# Patient Record
Sex: FEMALE | Race: WHITE | NOT HISPANIC OR LATINO | Employment: PART TIME | URBAN - METROPOLITAN AREA
[De-identification: names, ages, dates, MRNs, and addresses within clinical notes are randomized per-mention and may not be internally consistent; named-entity substitution may affect disease eponyms.]

---

## 2020-07-23 ENCOUNTER — ANCILLARY PROCEDURE (OUTPATIENT)
Dept: GENERAL RADIOLOGY | Facility: CLINIC | Age: 25
End: 2020-07-23
Attending: NURSE PRACTITIONER
Payer: COMMERCIAL

## 2020-07-23 ENCOUNTER — OFFICE VISIT (OUTPATIENT)
Dept: INTERNAL MEDICINE | Facility: CLINIC | Age: 25
End: 2020-07-23
Payer: COMMERCIAL

## 2020-07-23 VITALS
HEART RATE: 76 BPM | HEIGHT: 65 IN | BODY MASS INDEX: 30.49 KG/M2 | SYSTOLIC BLOOD PRESSURE: 117 MMHG | WEIGHT: 183 LBS | DIASTOLIC BLOOD PRESSURE: 80 MMHG | OXYGEN SATURATION: 95 %

## 2020-07-23 DIAGNOSIS — M79.644 PAIN OF FINGER OF RIGHT HAND: ICD-10-CM

## 2020-07-23 DIAGNOSIS — Z12.4 SCREENING FOR CERVICAL CANCER: ICD-10-CM

## 2020-07-23 DIAGNOSIS — M79.644 PAIN OF FINGER OF RIGHT HAND: Primary | ICD-10-CM

## 2020-07-23 DIAGNOSIS — J45.990 EXERCISE-INDUCED ASTHMA: ICD-10-CM

## 2020-07-23 DIAGNOSIS — Z23 NEED FOR HPV VACCINATION: ICD-10-CM

## 2020-07-23 DIAGNOSIS — F43.10 PTSD (POST-TRAUMATIC STRESS DISORDER): ICD-10-CM

## 2020-07-23 DIAGNOSIS — Z86.018 HISTORY OF ATYPICAL SKIN MOLE: ICD-10-CM

## 2020-07-23 DIAGNOSIS — F19.11 SUBSTANCE ABUSE IN REMISSION (H): ICD-10-CM

## 2020-07-23 RX ORDER — BUSPIRONE HYDROCHLORIDE 15 MG/1
15 TABLET ORAL 2 TIMES DAILY
COMMUNITY
Start: 2019-07-23

## 2020-07-23 RX ORDER — ALBUTEROL SULFATE 90 UG/1
2 AEROSOL, METERED RESPIRATORY (INHALATION)
COMMUNITY
Start: 2018-12-04 | End: 2020-07-23

## 2020-07-23 RX ORDER — LAMOTRIGINE 25 MG/1
150 TABLET ORAL
COMMUNITY
End: 2024-08-12

## 2020-07-23 RX ORDER — ALBUTEROL SULFATE 90 UG/1
2 AEROSOL, METERED RESPIRATORY (INHALATION) EVERY 4 HOURS PRN
Qty: 1 INHALER | Refills: 3 | Status: SHIPPED | OUTPATIENT
Start: 2020-07-23 | End: 2023-10-24

## 2020-07-23 RX ORDER — PROPRANOLOL HYDROCHLORIDE 10 MG/1
5 TABLET ORAL
COMMUNITY
Start: 2020-02-24 | End: 2023-05-24

## 2020-07-23 ASSESSMENT — MIFFLIN-ST. JEOR: SCORE: 1585.96

## 2020-07-23 ASSESSMENT — PAIN SCALES - GENERAL: PAINLEVEL: NO PAIN (0)

## 2020-07-23 NOTE — NURSING NOTE
Chief Complaint   Patient presents with     Establish Care     pt here to establish care     IUD     pt here to ave iud removal, possible pap       Shweta Rosario CMA, EMT at 2:26 PM on 7/23/2020.

## 2020-07-23 NOTE — NURSING NOTE
Chief Complaint   Patient presents with     Establish Care     pt here to establish care     IUD     pt here to ave iud removed, possible pap       Shweta Rosario CMA, EMT at 2:22 PM on 7/23/2020.

## 2020-07-23 NOTE — PATIENT INSTRUCTIONS
[facility] Medication Refill Request Information:  * Please contact your pharmacy regarding ANY request for medication refills.  ** [facility] Prescription Fax = [phone]  * Please allow 3 business days for routine medication refills.  * Please allow 5 business days for controlled substance medication refills.     [facility] Test Result notification information:  *You will be notified with in 7-10 days of your appointment day regarding the results of your test.  If you are on MyChart you will be notified as soon as the provider has reviewed the results and signed off on them.    [facility]: [phone]

## 2020-07-24 ENCOUNTER — TELEPHONE (OUTPATIENT)
Dept: INTERNAL MEDICINE | Facility: CLINIC | Age: 25
End: 2020-07-24
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
## 2020-07-24 NOTE — TELEPHONE ENCOUNTER
----- Message from Amanda Lepe RN sent at 7/24/2020 11:40 AM CDT -----  Please schedule with ortho (hand) and call the pt. Thanks.  Amanda

## 2020-07-27 ASSESSMENT — ENCOUNTER SYMPTOMS
STIFFNESS: 0
MUSCLE WEAKNESS: 0
JOINT SWELLING: 0
BACK PAIN: 0
MUSCLE CRAMPS: 0
MYALGIAS: 0
ARTHRALGIAS: 1
NECK PAIN: 0

## 2020-07-28 LAB
COPATH REPORT: ABNORMAL
PAP: ABNORMAL

## 2020-07-29 ENCOUNTER — TELEPHONE (OUTPATIENT)
Dept: INTERNAL MEDICINE | Facility: CLINIC | Age: 25
End: 2020-07-29

## 2020-07-29 ENCOUNTER — MYC MEDICAL ADVICE (OUTPATIENT)
Dept: INTERNAL MEDICINE | Facility: CLINIC | Age: 25
End: 2020-07-29

## 2020-07-29 NOTE — TELEPHONE ENCOUNTER
[facility] Call Center    Phone Message    May a detailed message be left on voicemail: yes     Reason for Call: Requesting Results   Name/type of test: Pap Smear   Date of test: 07/23/20  Was test done at a location other than [facility] (Please fill in the location if not [facility])?: No      Action Taken: Message routed to:  [facility] ([facility] pcp     Travel Screening: Not Applicable

## 2020-08-03 ENCOUNTER — OFFICE VISIT (OUTPATIENT)
Dept: ORTHOPEDICS | Facility: CLINIC | Age: 25
End: 2020-08-03
Attending: NURSE PRACTITIONER
Payer: COMMERCIAL

## 2020-08-03 DIAGNOSIS — S63.616S: Primary | ICD-10-CM
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
## 2020-08-03 NOTE — NURSING NOTE
Reason For Visit:   Chief Complaint   Patient presents with     Consult For     Pain of right small finger       Primary MD: Malinda Sarmiento    ?  No    Age: 25 year old    Occupation: [employer].  Currently working? Yes.  Work status?  Part-time.  Date of injury: 2 months ago  Type of injury: Fell, old fracture.  Date of surgery: NA  Type of surgery: NA.  Smoker: Yes  Request smoking cessation information: No      There were no vitals taken for this visit.      Pain Assessment  Patient Currently in Pain: Denies(Only hurts if she bends it)               QuickDASH Assessment  No flowsheet data found.       Allergies   Allergen Reactions     Quetiapine Rash       Odalys Bunn, ATC
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
## 2020-09-03 ENCOUNTER — OFFICE VISIT (OUTPATIENT)
Dept: DERMATOLOGY | Facility: CLINIC | Age: 25
End: 2020-09-03
Payer: COMMERCIAL

## 2020-09-03 DIAGNOSIS — D22.9 MULTIPLE BENIGN NEVI: Primary | ICD-10-CM

## 2020-09-03 NOTE — PATIENT INSTRUCTIONS
Skin looks great today!  Keep up the good work :)    Return in 2-3 years, sooner if concerns.    The ABCDEs of Melanoma  Asymmetry, Border (irregularity), Color (not uniform, changes in color), Diameter (greater than 6 mm which is about the size of a pencil eraser), and Evolving (any changes in preexisting moles)    Skin cancer can develop anywhere on the skin. Ask someone for help when checking your skin, especially in hard to see places. If you notice a mole different from others, or that changes, enlarges, itches, or bleeds (even if it is small), you should see a dermatologist.

## 2020-09-03 NOTE — NURSING NOTE
Chief Complaint   Patient presents with     Derm Problem     Patient is here today for a skin exam. No concerning spots.      Dorothy SNYDER CMA
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
## 2020-09-14 ENCOUNTER — THERAPY VISIT (OUTPATIENT)
Dept: OCCUPATIONAL THERAPY | Facility: CLINIC | Age: 25
End: 2020-09-14
Attending: ORTHOPAEDIC SURGERY
Payer: COMMERCIAL

## 2020-09-14 DIAGNOSIS — M25.641 FINGER STIFFNESS, RIGHT: Primary | ICD-10-CM

## 2020-09-14 DIAGNOSIS — M79.644 PAIN OF FINGER OF RIGHT HAND: ICD-10-CM

## 2020-09-14 DIAGNOSIS — S63.616S: ICD-10-CM

## 2020-09-14 PROCEDURE — 97112 NEUROMUSCULAR REEDUCATION: CPT | Mod: GO | Performed by: OCCUPATIONAL THERAPIST

## 2020-09-14 PROCEDURE — 97165 OT EVAL LOW COMPLEX 30 MIN: CPT | Mod: GO | Performed by: OCCUPATIONAL THERAPIST

## 2020-09-14 PROCEDURE — 97110 THERAPEUTIC EXERCISES: CPT | Mod: GO | Performed by: OCCUPATIONAL THERAPIST
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
## 2020-09-21 ENCOUNTER — THERAPY VISIT (OUTPATIENT)
Dept: OCCUPATIONAL THERAPY | Facility: CLINIC | Age: 25
End: 2020-09-21
Payer: COMMERCIAL

## 2020-09-21 DIAGNOSIS — M25.641 FINGER STIFFNESS, RIGHT: Primary | ICD-10-CM

## 2020-09-21 DIAGNOSIS — M79.644 PAIN OF FINGER OF RIGHT HAND: ICD-10-CM

## 2020-09-21 PROCEDURE — 97110 THERAPEUTIC EXERCISES: CPT | Mod: GO | Performed by: OCCUPATIONAL THERAPIST

## 2020-09-21 PROCEDURE — 97530 THERAPEUTIC ACTIVITIES: CPT | Mod: GO | Performed by: OCCUPATIONAL THERAPIST

## 2020-09-21 NOTE — PROGRESS NOTES
SOAP note objective information for 2020.  Please refer to the daily flowsheet for treatment today, total treatment time and time spent performing 1:1 timed codes.        Diagnosis: R SF injury  DOI: 2020        Occupational Profile Information:  Right hand dominant  Prior functional level:  no limitations  Patient reports symptoms of pain, stiffness/loss of motion and weakness/loss of strength  Special tests:  x-ray.    Previous treatment: some self therapy only an alumnafoam splint purchased at drug store  Barriers include:none  Mobility: No difficulty  Transportation: drives  Currently working in normal job withour restrictions  Leisure activities/hobbies: longboarding, play guitar, running, riding bike, doing [id number]       Functional Outcome Measure:   2020  Upper Extremity Functional Index Score:  SCORE:   Column Totals: /80: 67   (A lower score indicates greater disability.)      Objective:  Pain Level (Scale 0-10)   2020   At Rest 0   With Use 0-7 when forces     Pain Description  Date 2020   Location small finger   Pain Quality Aching, Sharp and Throbbing   Frequency intermittent     Pain is worst  daytime   Exacerbated by  gripping and when it is pushed   Relieved by rest   Progression Some better, but not recently     Edema  Mild    Sensation   WNL throughout all nerve distributions; per patient report    ROM   Fingers 2020   AROM(PROM) Right Left R   Small MP HE/96 0/100 HE/99 +3   PIP -7/69  (0/76) 0/95 -5/74 +7   DIP -5/65  (0/70) 0/80 -2/ +12   HANNON   +22         Strength  STRENGTH:   (Measured in pounds)   2020    Trials Right Left   1  2  3  Av  68  66  66 66  74  70         Assessment:  See flowsheet    Plan:  Frequency:  1 X week, once daily  Duration:  for 6 weeks    Treatment Plan:   Therapeutic Exercise:  AROM, AAROM, PROM, Tendon Gliding, Blocking, Isotonics and Isometrics  Neuromuscular re-education:  Coordination/Dexterity  and Kinesiotaping  Manual Techniques:  Joint mobilization and Myofascial release    Discharge Plan:  Achieve all LTG.  Independent in home treatment program.  Reach maximal therapeutic benefit.    Home Exercise Program:  9/21/2020  Added tracking with DIP flexion and flexion taping and flicking for extension    Next Visit:  Progress ROM and check on Ktaping

## 2020-09-28 ENCOUNTER — THERAPY VISIT (OUTPATIENT)
Dept: OCCUPATIONAL THERAPY | Facility: CLINIC | Age: 25
End: 2020-09-28
Payer: COMMERCIAL

## 2020-09-28 DIAGNOSIS — M25.641 FINGER STIFFNESS, RIGHT: Primary | ICD-10-CM

## 2020-09-28 DIAGNOSIS — M79.644 PAIN OF FINGER OF RIGHT HAND: ICD-10-CM

## 2020-09-28 PROCEDURE — 97110 THERAPEUTIC EXERCISES: CPT | Mod: GO | Performed by: OCCUPATIONAL THERAPIST

## 2020-09-28 PROCEDURE — 97530 THERAPEUTIC ACTIVITIES: CPT | Mod: GO | Performed by: OCCUPATIONAL THERAPIST

## 2020-09-28 PROCEDURE — 97140 MANUAL THERAPY 1/> REGIONS: CPT | Mod: GO | Performed by: OCCUPATIONAL THERAPIST

## 2020-09-28 NOTE — PROGRESS NOTES
Hand Therapy Progress Note  9/28/2020  Initial Visit: 9/14/20    Total Visits: 3    Diagnosis: R SF injury  DOI: 6/14/2020    S:  Subjective changes as noted by patient: pt notes she is using her hand more in all things, and writing is better Pt notes she lost the finger flexion strap and would like another one.   Functional changes noted by patient: Improvement in Self Care Tasks (dressing, eating, bathing, hygiene/toileting), Work Tasks and Household Chores  Response to previous treatment:  excellent  Patient has noted adverse reaction to:   None      Occupational Profile Information:  Right hand dominant  Prior functional level:  no limitations  Patient reports symptoms of pain, stiffness/loss of motion and weakness/loss of strength  Special tests:  x-ray.    Previous treatment: some self therapy only an alumnafoam splint purchased at drug store  Barriers include:none  Mobility: No difficulty  Transportation: drives  Currently working in normal job withour restrictions  Leisure activities/hobbies: longboarding, play guitar, running, riding bike, doing art/[id number]       Functional Outcome Measure:   9/14/2020  Upper Extremity Functional Index Score:  SCORE:   Column Totals: /80: 67   (A lower score indicates greater disability.)  9/28/2020  Upper Extremity Functional Index Score:  SCORE:   Column Totals: /80: 79   (A lower score indicates greater disability.)        Objective:  Pain Level (Scale 0-10)   9/14/2020 9/28/20   At Rest 0 0   With Use 0-7 when forces 1-2 when writing, gripping     Pain Description  Date 9/14/2020 9/28/20   Location small finger    Pain Quality Aching, Sharp and Throbbing Only some aching, or with writing   Frequency intermittent      Pain is worst  daytime    Exacerbated by  gripping and when it is pushed    Relieved by rest    Progression Some better, but not recently Much improved     Edema  resolved    Sensation   WNL throughout all nerve distributions; per patient report    ROM    Fingers 2020   AROM(PROM) Right Left R R   Small MP HE/96 0/100 HE/99 +3 HE/100 +1   PIP -  (0/76) 0/95 - +7 0/75 +6   DIP -  (0) 0/80 -274 +12 0/76 +3   HANNON   +22 +10         Strength  STRENGTH:   (Measured in pounds)   2020   Trials Right Left R   1  2  3  Av  68  66  66 66  74  70 75  72  72  73       Assessment:  Response to therapy has been improvement to:  ROM of Fingers: All Planes  Strength:   and pinch  Pain:  frequency is less, intensity of pain is decreased, duration of pain is decreased and less tender over affected area  Self Care Skills:  Improved use of hand in all areas    Overall Assessment:  Patient's symptoms are resolving.  Patient is becoming more independent in home exercise program  STG/LTG:  STGoals have been reviewed and progress or achievement has occurred;  see goal sheet for details and updates.  LTGoals have been reviewed and progress or achievement has occurred:  see goal sheet for details and updates.  I have re-evaluated this patient and find that the nature, scope, duration and intensity of the therapy is appropriate for the medical condition of the patient.        Plan:  Discharge to home program.      Treatment Plan:   Therapeutic Exercise:  AROM, AAROM, PROM, Tendon Gliding, Blocking, Isotonics and Isometrics  Neuromuscular re-education:  Coordination/Dexterity and Kinesiotaping  Manual Techniques:  Joint mobilization and Myofascial release    Discharge Plan:  Achieve all LTG.  Independent in home treatment program.  Reach maximal therapeutic benefit.    Home Exercise Program:  2020  Added tracking with DIP flexion and flexion taping and flicking for extension  2020  Continue same independently

## 2020-12-10 ENCOUNTER — OFFICE VISIT (OUTPATIENT)
Dept: FAMILY MEDICINE | Facility: CLINIC | Age: 25
End: 2020-12-10
Payer: COMMERCIAL

## 2020-12-10 VITALS
WEIGHT: 185 LBS | TEMPERATURE: 97.8 F | BODY MASS INDEX: 30.79 KG/M2 | SYSTOLIC BLOOD PRESSURE: 100 MMHG | DIASTOLIC BLOOD PRESSURE: 70 MMHG | HEART RATE: 70 BPM | OXYGEN SATURATION: 97 % | RESPIRATION RATE: 16 BRPM

## 2020-12-10 DIAGNOSIS — R11.2 INTRACTABLE VOMITING WITH NAUSEA, UNSPECIFIED VOMITING TYPE: Primary | ICD-10-CM

## 2020-12-10 DIAGNOSIS — E73.9 LACTOSE INTOLERANCE: ICD-10-CM

## 2020-12-10 PROCEDURE — 99213 OFFICE O/P EST LOW 20 MIN: CPT | Performed by: PHYSICIAN ASSISTANT

## 2020-12-10 RX ORDER — ONDANSETRON 4 MG/1
4 TABLET, ORALLY DISINTEGRATING ORAL EVERY 8 HOURS PRN
Qty: 10 TABLET | Refills: 0 | Status: SHIPPED | OUTPATIENT
Start: 2020-12-10 | End: 2021-09-16

## 2020-12-10 ASSESSMENT — ENCOUNTER SYMPTOMS
PSYCHIATRIC NEGATIVE: 1
CARDIOVASCULAR NEGATIVE: 1
MUSCULOSKELETAL NEGATIVE: 1
ENDOCRINE NEGATIVE: 1
EYES NEGATIVE: 1
RESPIRATORY NEGATIVE: 1
CONSTITUTIONAL NEGATIVE: 1
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
## 2020-12-10 NOTE — PATIENT INSTRUCTIONS
"  Patient Education     Viral Gastroenteritis (Adult)    Gastroenteritis is commonly called the \"stomach flu,\" although it has nothing to do with influenza. It is most often caused by a virus that affects the stomach and intestinal tract and usually lasts from 2 to 7 days. Common viruses causing gastroenteritis include norovirus, rotavirus, and hepatitis A. Non-viral causes of gastroenteritis include bacteria, parasites, and toxins.  The danger from repeated vomiting or diarrhea is dehydration. This is the loss of too much fluid from the body. When this occurs, body fluids must be replaced. Antibiotics don't help with this illness because it is usually viral. Simple home treatment will be helpful.  Symptoms of viral gastroenteritis may include:    Watery, loose stools    Stomach pain or abdominal cramps    Fever and chills    Nausea and vomiting    Loss of bowel control    Headache  Home care  Gastroenteritis is transmitted by contact with the stool or vomit of an infected person. This can occur from person to person or from contact with a contaminated surface.  Follow these guidelines when caring for yourself at home:    If symptoms are severe, rest at home for the next 24 hours or until you are feeling better.    Wash your hands with soap and water or use alcohol-based  to prevent the spread of infection. Wash your hands after touching anyone who is sick.    Wash your hands or use alcohol-based  after using the toilet and before meals. Clean the toilet after each use.  Remember these tips when preparing food:    People with diarrhea should not prepare or serve food to others. When preparing foods, wash your hands before and after.    Wash your hands after using cutting boards, countertops, knives, or utensils that have been in contact with raw food.    Dry your hands with a single use towel.    Keep uncooked meats away from cooked and ready-to-eat foods.  Medicine  You may use acetaminophen or " NSAID medicines like ibuprofen or naproxen to control fever unless another medicine was given. If you have chronic liver or kidney disease, talk with your healthcare provider before using these medicines. Also talk with your provider if you've had a stomach ulcer or gastrointestinal bleeding. Don't give aspirin to anyone under 18 years of age who is ill with a fever. It may cause severe liver damage. Don't use NSAIDS is you are already taking one for another condition (like arthritis) or are on aspirin (such as for heart disease or after a stroke).  If medicine for vomiting or diarrhea are prescribed, take these only as directed. Nausea and diarrhea medicines are generally OK unless you have bleeding, fever, or severe abdominal pain.  Diet  Follow these guidelines for food:    Water and liquids are important so you don't get dehydrated. Drink a small amount at a time or suck on ice chips if you are vomiting.    If you eat, avoid fatty, greasy, spicy, or fried foods.    Don't eat dairy if you have diarrhea. This can make diarrhea worse.    Avoid tobacco, alcohol, and caffeine which may worsen symptoms.  During the first 24 hours (the first full day), follow the diet below:    Beverages. Sports drinks, soft drinks without caffeine, ginger ale, mineral water (plain or flavored), decaffeinated tea and coffee. If you are very dehydrated, sports drinks aren't a good choice. They have too much sugar and not enough electrolytes. In this case, commercially available products called oral rehydration solutions, are best.    Soups. Eat clear broth, consommé, and bouillon.    Desserts. Eat gelatin, ice pops, and fruit juice bars.  During the next 24 hours (the second day), you may add the following to the above:    Hot cereal, plain toast, bread, rolls, and crackers    Plain noodles, rice, mashed potatoes, chicken noodle or rice soup    Unsweetened canned fruit (avoid pineapple), bananas    Limit fat intake to less than 15 grams  per day. Do this by avoiding margarine, butter, oils, mayonnaise, sauces, gravies, fried foods, peanut butter, meat, poultry, and fish.    Limit fiber and avoid raw or cooked vegetables, fresh fruits (except bananas), and bran cereals.    Limit caffeine and chocolate. Don't use spices or seasonings other than salt.    Limit dairy products.    Avoid alcohol.  During the next 24 hours:    Gradually resume a normal diet as you feel better and your symptoms improve.    If at any time it starts getting worse again, go back to clear liquids until you feel better.  Follow-up care  Follow up with your healthcare provider, or as advised. Call your provider if you don't get better within 24 hours or if diarrhea lasts more than a week. Also follow up if you are unable to keep down liquids and get dehydrated. If a stool (diarrhea) sample was taken, call as directed for the results.  Call 911  Call 911 if any of these occur:    Trouble breathing    Chest pain    Confused    Severe drowsiness or trouble awakening    Fainting or loss of consciousness    Rapid heart rate    Seizure    Stiff neck  When to seek medical advice  Call your healthcare provider right away if any of these occur:    Abdominal pain that gets worse    Continued vomiting (unable to keep liquids down)    Frequent diarrhea (more than 5 times a day)    Blood in vomit or stool (black or red color)    Dark urine, reduced urine output, or extreme thirst    Weakness or dizziness    Drowsiness    Fever of 100.4 F (38 C) or higher, or as directed by your healthcare provider    New rash  StayWell last reviewed this educational content on 6/1/2018 2000-2020 [employer], [employer]. [address] [place] [address], [place] [address]. All rights reserved. This information is not intended as a substitute for professional medical care. Always follow your healthcare professional's instructions.

## 2020-12-11 ASSESSMENT — ASTHMA QUESTIONNAIRES: ACT_TOTALSCORE: 22

## 2021-01-15 ENCOUNTER — HEALTH MAINTENANCE LETTER (OUTPATIENT)
Age: 26
End: 2021-01-15

## 2021-01-25 ENCOUNTER — ALLIED HEALTH/NURSE VISIT (OUTPATIENT)
Dept: INTERNAL MEDICINE | Facility: CLINIC | Age: 26
End: 2021-01-25
Payer: COMMERCIAL

## 2021-01-25 DIAGNOSIS — Z23 NEED FOR HPV VACCINE: Primary | ICD-10-CM

## 2021-01-25 PROCEDURE — 99207 PR NO CHARGE NURSE ONLY: CPT

## 2021-01-25 PROCEDURE — 90471 IMMUNIZATION ADMIN: CPT

## 2021-01-25 PROCEDURE — 90651 9VHPV VACCINE 2/3 DOSE IM: CPT

## 2021-01-25 NOTE — NURSING NOTE
Ame Ayala comes into clinic today at the request of Dr. Malinda Sarmiento Ordering Provider for 2nd HPV vaccination/    Patient declined any previous vaccine reactions. Patient is in good health and received vaccine in the left deltoid. Patient advised to wait for fifteen minutes in [facility] lobby to ensure no adverse side affects. Patient was scheduled for her 3rd HPV vaccine one month out due to her initial HPV vaccine.    This service provided today was under the supervising provider of the day Dr. Green, who was available if needed.    Mari Sierra MA

## 2021-02-19 ENCOUNTER — IMMUNIZATION (OUTPATIENT)
Dept: PEDIATRICS | Facility: CLINIC | Age: 26
End: 2021-02-19
Payer: COMMERCIAL

## 2021-02-19 PROCEDURE — 91300 PR COVID VAC PFIZER DIL RECON 30 MCG/0.3 ML IM: CPT

## 2021-02-19 PROCEDURE — 0001A PR COVID VAC PFIZER DIL RECON 30 MCG/0.3 ML IM: CPT

## 2021-02-25 ENCOUNTER — TELEPHONE (OUTPATIENT)
Dept: INTERNAL MEDICINE | Facility: CLINIC | Age: 26
End: 2021-02-25

## 2021-02-25 NOTE — TELEPHONE ENCOUNTER
[facility] Call Center    Phone Message    May a detailed message be left on voicemail: yes     Reason for Call: Other: Patient has an appointment for tomorrow to get her HPV shot but was wondering if she could wait until after her COVID19 vaccine? Please call patient ASAP. Thank you.      Action Taken: Message routed to:  [facility]    Travel Screening: Not Applicable

## 2021-02-26 NOTE — TELEPHONE ENCOUNTER
Called pt and LVM. Recommended that she get the COVID-19 vaccine series first and then continue with the third dose of the HPV vaccine.     Veronica Fields, EMT at 8:01 AM sign on 2/26/2021

## 2021-03-12 ENCOUNTER — IMMUNIZATION (OUTPATIENT)
Dept: PEDIATRICS | Facility: CLINIC | Age: 26
End: 2021-03-12
Attending: INTERNAL MEDICINE
Payer: COMMERCIAL

## 2021-03-12 PROCEDURE — 91300 PR COVID VAC PFIZER DIL RECON 30 MCG/0.3 ML IM: CPT

## 2021-03-12 PROCEDURE — 0002A PR COVID VAC PFIZER DIL RECON 30 MCG/0.3 ML IM: CPT

## 2021-03-26 ENCOUNTER — ALLIED HEALTH/NURSE VISIT (OUTPATIENT)
Dept: INTERNAL MEDICINE | Facility: CLINIC | Age: 26
End: 2021-03-26
Payer: COMMERCIAL

## 2021-03-26 DIAGNOSIS — Z23 NEED FOR VACCINATION: Primary | ICD-10-CM

## 2021-03-26 PROCEDURE — 99207 PR NO CHARGE INJECTABLE MED/DRUG: CPT

## 2021-03-26 PROCEDURE — 90651 9VHPV VACCINE 2/3 DOSE IM: CPT

## 2021-03-26 PROCEDURE — 90471 IMMUNIZATION ADMIN: CPT

## 2021-03-26 NOTE — NURSING NOTE
Chief Complaint   Patient presents with     Imm/Inj     3rd HPV     Ame Ayala comes into clinic today at the request of Malinda Sarmiento. Ordering Provider for 3rd HPV vaccinaiton.    Pt received injection and tolerated well.    This service provided today was under the supervising provider of the day Dr. Green, who was available if needed.    Kimberly H. Nissen

## 2021-05-06 ENCOUNTER — ANCILLARY PROCEDURE (OUTPATIENT)
Dept: GENERAL RADIOLOGY | Facility: CLINIC | Age: 26
End: 2021-05-06
Attending: PEDIATRICS
Payer: OTHER MISCELLANEOUS

## 2021-05-06 ENCOUNTER — OFFICE VISIT (OUTPATIENT)
Dept: ORTHOPEDICS | Facility: CLINIC | Age: 26
End: 2021-05-06
Payer: OTHER MISCELLANEOUS

## 2021-05-06 VITALS
BODY MASS INDEX: 30.82 KG/M2 | DIASTOLIC BLOOD PRESSURE: 74 MMHG | HEIGHT: 65 IN | SYSTOLIC BLOOD PRESSURE: 118 MMHG | WEIGHT: 185 LBS

## 2021-05-06 DIAGNOSIS — M54.16 LUMBAR RADICULOPATHY: ICD-10-CM

## 2021-05-06 DIAGNOSIS — M54.41 ACUTE BILATERAL LOW BACK PAIN WITH RIGHT-SIDED SCIATICA: Primary | ICD-10-CM

## 2021-05-06 DIAGNOSIS — M54.50 LOWER BACK PAIN: ICD-10-CM

## 2021-05-06 PROCEDURE — 72100 X-RAY EXAM L-S SPINE 2/3 VWS: CPT | Performed by: RADIOLOGY

## 2021-05-06 PROCEDURE — 99204 OFFICE O/P NEW MOD 45 MIN: CPT | Performed by: PEDIATRICS

## 2021-05-06 RX ORDER — METHYLPREDNISOLONE 4 MG
TABLET, DOSE PACK ORAL
Qty: 21 TABLET | Refills: 0 | Status: SHIPPED | OUTPATIENT
Start: 2021-05-06 | End: 2021-09-16

## 2021-05-06 ASSESSMENT — MIFFLIN-ST. JEOR: SCORE: 1590.03

## 2021-05-06 NOTE — PATIENT INSTRUCTIONS
Plan:  - Today's Plan of Care:  Rehab: Physical Therapy: [facility] - [phone]  Prescription Medication as directed: Medrol Dose Pack  Work Letter    -We also discussed other future treatment options:  Lumbar MRI    Follow Up: 3 weeks    If you have any further questions for your physician or physician s care team you can call [phone] and use option 3 to leave a voice message. Calls received during business hours will be returned same day.

## 2021-05-06 NOTE — LETTER
5/6/2021         RE: Ame Ayala  [address]  [facility] [address]        Dear Colleague,    Thank you for referring your patient, Ame Ayala, to the [facility]. Please see a copy of my visit note below.    ASSESSMENT & PLAN    Ame was seen today for recheck.    Diagnoses and all orders for this visit:    Acute bilateral low back pain with right-sided sciatica  -     XR Lumbar Spine 2/3 Views; Future  -     methylPREDNISolone (MEDROL DOSEPAK) 4 MG tablet therapy pack; Follow Package Directions  -     [facility]; Future    Lumbar radiculopathy  -     methylPREDNISolone (MEDROL DOSEPAK) 4 MG tablet therapy pack; Follow Package Directions  -     [facility]; Future      This issue is acute and Unchanged.  Low back pain, likely lumbar radiculopathy. SI joint dysfunction remains a possibility as well. Normal neurologic exam currently. I reviewed images and discussed the diagnosis. Recommended Medrol Dosepak (I reviewed the mechanism of action as well as risk/benefit profile of medications. Questions answered.) along with physical therapy.  Follow up in 3-4 weeks, would consider advanced imaging pending clinical course.    Plan:  - Today's Plan of Care:  Rehab: Physical Therapy: [facility] - [phone]  Prescription Medication as directed: Medrol Dose Pack  Work Letter    -We also discussed other future treatment options:  Lumbar MRI    Follow Up: 3 weeks    Concerning signs and symptoms were reviewed.  The patient expressed understanding of this management plan and all questions were answered at this time.    Sandy Jones MD [facility]  Sports Medicine Physician  [facility]      -----  Chief Complaint   Patient presents with     Lower Back - RECHECK       SUBJECTIVE  Ame Ayala is a/an 25 year old female who is seen as a self referral for evaluation of lower back pain.     The patient is seen by  "themselves.  The patient is Right handed    Onset: 1 day(s) ago. Patient describes injury at work. She states she was giving a kid a piggy back ride and twisted. She feels pain in the right lower back. She reports radiating right leg pain with numbness and tingling. He has history of sciatica for several years on and off, this is worse. She is taking ibuprofen for her pain.    Location of Pain: right lower back  Worsened by: lifting her leg, bending over, lifting, crossing her legs  Better with: ice and ibuprofen  Treatments tried: rest/activity avoidance, ice and ibuprofen  Associated symptoms: numbness and tingling    Orthopedic/Surgical history: NO  Social History/Occupation: pediatric trauma therapist    No family history pertinent to patient's problem today.    REVIEW OF SYSTEMS:  Review of Systems  Skin: no bruising, no swelling  Musculoskeletal: as above  Neurologic: yes numbness, paresthesias  Remainder of review of systems is negative including constitutional, CV, pulmonary, GI, except as noted in HPI or medical history.    OBJECTIVE:  /74   Ht 1.651 m (5' 5\")   Wt 83.9 kg (185 lb)   BMI 30.79 kg/m     General: healthy, alert and in no distress  HEENT: no scleral icterus or conjunctival erythema  Skin: no suspicious lesions or rash. No jaundice.  CV: distal perfusion intact  Resp: normal respiratory effort without conversational dyspnea   Psych: normal mood and affect  Gait: normal steady gait with appropriate coordination and balance  Neuro: Normal light sensory exam of lower extremity    Low back exam:  Inspection:     no visible deformity in the low back       normal skin       normal vascular       normal lymphatic       no asymmetry    Posture:      normal    Tender:     paraspinal muscles       Right SI joint    Non Tender:     remainder of lumbar spine    ROM:      limited flexion due to pain       limited extension due to pain    Strength:     hip flexion 5/5 bilateral       knee extension " 5/5 bilateral       ankle dorsiflexion 5/5 bilateral       ankle plantarflexion 5/5 bilateral       dorsiflexion of the great toe 5/5 bilateral       able to heel and toe walk    Reflexes:     patellar (L3, L4) symmetric normal       achilles tendons (S1) symmetric normal    Sensation:    grossly intact throughout lower extremities    Special tests:      straight leg raise left negative        straight leg raise right positive       neg (-) OSKAR  bilateral       neg (-) FADIR  bilateral    RADIOLOGY:  I independently ordered, visualized and reviewed these images with the patient  3 XR views of lumbar spine reviewed: no acute bony abnormality, no significant degenerative change  - will follow official read      Review of the result(s) of each unique test - XR             Again, thank you for allowing me to participate in the care of your patient.        Sincerely,        Sandy Jones MD

## 2021-05-06 NOTE — PROGRESS NOTES
ASSESSMENT & PLAN    Ame was seen today for recheck.    Diagnoses and all orders for this visit:    Acute bilateral low back pain with right-sided sciatica  -     XR Lumbar Spine 2/3 Views; Future  -     methylPREDNISolone (MEDROL DOSEPAK) 4 MG tablet therapy pack; Follow Package Directions  -     [facility]; Future    Lumbar radiculopathy  -     methylPREDNISolone (MEDROL DOSEPAK) 4 MG tablet therapy pack; Follow Package Directions  -     [facility]; Future      This issue is acute and Unchanged.  Low back pain, likely lumbar radiculopathy. SI joint dysfunction remains a possibility as well. Normal neurologic exam currently. I reviewed images and discussed the diagnosis. Recommended Medrol Dosepak (I reviewed the mechanism of action as well as risk/benefit profile of medications. Questions answered.) along with physical therapy.  Follow up in 3-4 weeks, would consider advanced imaging pending clinical course.    Plan:  - Today's Plan of Care:  Rehab: Physical Therapy: [facility] - [phone]  Prescription Medication as directed: Medrol Dose Pack  Work Letter    -We also discussed other future treatment options:  Lumbar MRI    Follow Up: 3 weeks    Concerning signs and symptoms were reviewed.  The patient expressed understanding of this management plan and all questions were answered at this time.    Sandy Jones MD [facility]  Sports Medicine Physician  [facility]      -----  Chief Complaint   Patient presents with     Lower Back - RECHECK       SUBJECTIVE  Ame Ayala is a/an 25 year old female who is seen as a self referral for evaluation of lower back pain.     The patient is seen by themselves.  The patient is Right handed    Onset: 1 day(s) ago. Patient describes injury at work. She states she was giving a kid a piggy back ride and twisted. She feels pain in the right lower back. She reports radiating right leg pain with numbness and  "tingling. He has history of sciatica for several years on and off, this is worse. She is taking ibuprofen for her pain.    Location of Pain: right lower back  Worsened by: lifting her leg, bending over, lifting, crossing her legs  Better with: ice and ibuprofen  Treatments tried: rest/activity avoidance, ice and ibuprofen  Associated symptoms: numbness and tingling    Orthopedic/Surgical history: NO  Social History/Occupation: pediatric trauma therapist    No family history pertinent to patient's problem today.    REVIEW OF SYSTEMS:  Review of Systems  Skin: no bruising, no swelling  Musculoskeletal: as above  Neurologic: yes numbness, paresthesias  Remainder of review of systems is negative including constitutional, CV, pulmonary, GI, except as noted in HPI or medical history.    OBJECTIVE:  /74   Ht 1.651 m (5' 5\")   Wt 83.9 kg (185 lb)   BMI 30.79 kg/m     General: healthy, alert and in no distress  HEENT: no scleral icterus or conjunctival erythema  Skin: no suspicious lesions or rash. No jaundice.  CV: distal perfusion intact  Resp: normal respiratory effort without conversational dyspnea   Psych: normal mood and affect  Gait: normal steady gait with appropriate coordination and balance  Neuro: Normal light sensory exam of lower extremity    Low back exam:  Inspection:     no visible deformity in the low back       normal skin       normal vascular       normal lymphatic       no asymmetry    Posture:      normal    Tender:     paraspinal muscles       Right SI joint    Non Tender:     remainder of lumbar spine    ROM:      limited flexion due to pain       limited extension due to pain    Strength:     hip flexion 5/5 bilateral       knee extension 5/5 bilateral       ankle dorsiflexion 5/5 bilateral       ankle plantarflexion 5/5 bilateral       dorsiflexion of the great toe 5/5 bilateral       able to heel and toe walk    Reflexes:     patellar (L3, L4) symmetric normal       achilles tendons (S1) " symmetric normal    Sensation:    grossly intact throughout lower extremities    Special tests:      straight leg raise left negative        straight leg raise right positive       neg (-) OSKAR  bilateral       neg (-) FADIR  bilateral    RADIOLOGY:  I independently ordered, visualized and reviewed these images with the patient  3 XR views of lumbar spine reviewed: no acute bony abnormality, no significant degenerative change  - will follow official read      Review of the result(s) of each unique test - XR

## 2021-05-06 NOTE — LETTER
May 6, 2021      Ame Ayala  [address]  [place] [address]        To Whom It May Concern,     Ame is under my care for low back injury.  She may return to work next available shift with the following restrictions.  - Seated work only  - Allow to change positions    Off work if unable to work under these restrictions.    Start physical therapy and follow up in ~ 3 weeks.    Sincerely,        Sandy Jones MD

## 2021-05-07 NOTE — RESULT ENCOUNTER NOTE
These results were discussed during office visit.    Sandy Jones MD, CAQ  Primary Care Sports Medicine  [facility]

## 2021-05-12 ENCOUNTER — THERAPY VISIT (OUTPATIENT)
Dept: PHYSICAL THERAPY | Facility: CLINIC | Age: 26
End: 2021-05-12
Payer: OTHER MISCELLANEOUS

## 2021-05-12 DIAGNOSIS — M54.41 ACUTE BILATERAL LOW BACK PAIN WITH RIGHT-SIDED SCIATICA: ICD-10-CM

## 2021-05-12 PROCEDURE — 97112 NEUROMUSCULAR REEDUCATION: CPT | Mod: GP | Performed by: PHYSICAL THERAPIST

## 2021-05-12 PROCEDURE — 97110 THERAPEUTIC EXERCISES: CPT | Mod: GP | Performed by: PHYSICAL THERAPIST

## 2021-05-12 PROCEDURE — 97161 PT EVAL LOW COMPLEX 20 MIN: CPT | Mod: GP | Performed by: PHYSICAL THERAPIST

## 2021-05-12 NOTE — PROGRESS NOTES
[facility] Initial Evaluation -- Lumbar    Date: May 12, 2021  Ame Ayala is a 25 year old female with a lumbar and R>L LE condition.   Referral: sports/ortho  Work mechanical stresses:  Computer work, driving, lifting carrying, sitting, standing  Employment status:  Mental health practitioner  Leisure mechanical stresses: yoga, exercise  Functional disability score (BRIANA/STarT Back):  36%; 6/9--MEDIUM  VAS score (0-10): 4/10  Patient goals/expectations:  To not have the pain    HISTORY:    Present symptoms: R>L LBP, pain into L LE to knee, R LE to toes  Pain quality (sharp/shooting/stabbing/aching/burning/cramping):  Aching, sharp   Paresthesia (yes/no):  Tingling R toes, middle toe    Present since (onset date): 05/05/2021.     Symptoms (improving/unchanging/worsening): worsening.     Symptoms commenced as a result of: carrying a child on her back at work while holding another child's hand who pulled her a little, went to yoga that night and had severe pain    Condition occurred in the following environment:   work     Symptoms at onset (back/thigh/leg): R LBP  Constant symptoms (back/thigh/leg): B LBP  Intermittent symptoms (back/thigh/leg): R LE to foot, L LE to knee    Symptoms are made worse with the following: sitting and moving leg in/out, getting in/out of the car, lifting, lifting, sitting 1 hour, driving, always bending, walking 1 mile, time of day no difference, no effect still or moving   Symptoms are made better with the following: ibuprofen    Disturbed sleep (yes/no):  Yes--occasional Sleeping postures (prone/sup/side R/L): sides    Previous episodes (0/1-5/6-10/11+): ongoing, intermittent since high school Year of first episode: high school--8 years ago?    Previous history: self-limiting episodes of LBP over the past several years  Previous treatments: none      Specific Questions:  Cough/Sneeze/Strain (pos/neg): neg   Bowel/Bladder (normal/abnormal): normal  Gait  (normal/abnormal): normal  Medications (nil/NSAIDS/analg/steroids/anticoag/other):  Steroids and Other - Cardiac and Anti-seizure  Medical allergies:  none  General health (excellent/good/fair/poor):  good  Pertinent medical history:  Asthma, Chemical dependency, Depression, Heart problems, Mental illness, Seizures and Smoking  Imaging (None/Xray/MRI/Other):  normal  Recent or major surgery (yes/no):  no  Night pain (yes/no): no  Accidents (yes/no): no  Unexplained weight loss (yes/no): no  Barriers at home: no  Other red flags: no    EXAMINATION    Posture:   Sitting (good/fair/poor): fair  Standing (good/fair/poor):good  Lordosis (red/acc/normal): normal  Correction of posture (better/worse/no effect): NE or ?better    Lateral Shift (right/left/nil): nil  Relevant (yes/no):  na  Other Observations: na    Neurological:    Motor deficit:  normal  Reflexes:  Not assessed  Sensory deficit:  normal  Dural signs:  Positive slump B for LBP    Movement Loss:   Scotty Mod Min Nil Pain   Flexion    x Increases B LBP   Extension  x x  Increases B LBP   Side Gliding R    x NE   Side Gliding L   x x Increases R LBP     Test Movements:   During: produces, abolishes, increases, decreases, no effect, centralizing, peripheralizing   After: better, worse, no better, no worse, no effect, centralized, peripheralized    Pretest symptoms standing:    Symptoms During Symptoms After ROM increased ROM decreased No Effect   FIS        Rep FIS        EIS        Rep EIS          Pretest symptoms lying:  Prone lying--LBP B 2/10   Symptoms During Symptoms After ROM increased ROM decreased No Effect   EPIFANIO        Rep EPIFANIO        EIL increases No Worse         Rep EIL Decreases    Better    X--ext       If required, pretest symptoms:    Symptoms During Symptoms After ROM increased ROM decreased No Effect   SGIS - R        Rep SGIS - R        SGIS - L        Rep SGIS - L          Static Tests:  Sitting slouched:     Sitting erect:    Standing  slouched:   Standing erect:    Lying prone in extension:  Increases, NW Long sitting:      Other Tests: L1-5 ext mobs--increases LBP, NW after    Provisional Classification:  Derangement - Asymmetrical, unilateral, symptoms below knee    Principle of Management:  Education:  Posture--use of lumbar roll in sitting, importance/impact of posture; POC, treatment rationale, expectations, centralization/peripheralization   Equipment provided:  none  Mechanical therapy (Y/N):  y   Extension principle:  REIL x10 reps, every 2 hours  Lateral Principle:    Flexion principle:    Other:      ASSESSMENT/PLAN:    Patient is a 25 year old female with lumbar complaints.  Provisional classification of lumbar derangement with directional preference for extension.  She had decreased pain and improved ROM after repeated lumbar extension in lying and will try this in addition to posture correction at home to assess effect.  She should make good progress with a lumbar extension program to improve mechanics, decrease pain, and improve overall mobility and function.   Patient has the following significant findings with corresponding treatment plan.                Diagnosis 1:  LBP, R>L radiculopathy  Pain -  self management, education, directional preference exercise and home program  Decreased ROM/flexibility - manual therapy, therapeutic exercise and home program  Decreased function - therapeutic activities and home program  Impaired posture - neuro re-education and home program    Low complexity.    Previous and current functional limitations:  (See Goal Flow Sheet for this information)    Short term and Long term goals: (See Goal Flow Sheet for this information)     Communication ability:  Patient appears to be able to clearly communicate and understand verbal and written communication and follow directions correctly.  Treatment Explanation - The following has been discussed with the patient:   RX ordered/plan of care  Anticipated  outcomes  Possible risks and side effects  This patient would benefit from PT intervention to resume normal activities.   Rehab potential is good.    Frequency:  1 X week, once daily  Duration:  for 6 weeks  Discharge Plan:  Achieve all LTG.  Independent in home treatment program.  Reach maximal therapeutic benefit.    Please refer to the daily flowsheet for treatment today, total treatment time and time spent performing 1:1 timed codes.

## 2021-05-17 ENCOUNTER — THERAPY VISIT (OUTPATIENT)
Dept: PHYSICAL THERAPY | Facility: CLINIC | Age: 26
End: 2021-05-17
Payer: OTHER MISCELLANEOUS

## 2021-05-17 DIAGNOSIS — M54.16 LUMBAR RADICULOPATHY: ICD-10-CM

## 2021-05-17 DIAGNOSIS — M54.41 ACUTE BILATERAL LOW BACK PAIN WITH RIGHT-SIDED SCIATICA: ICD-10-CM

## 2021-05-17 PROCEDURE — 97110 THERAPEUTIC EXERCISES: CPT | Performed by: PHYSICAL THERAPIST

## 2021-05-27 ENCOUNTER — OFFICE VISIT (OUTPATIENT)
Dept: ORTHOPEDICS | Facility: CLINIC | Age: 26
End: 2021-05-27
Payer: OTHER MISCELLANEOUS

## 2021-05-27 ENCOUNTER — THERAPY VISIT (OUTPATIENT)
Dept: PHYSICAL THERAPY | Facility: CLINIC | Age: 26
End: 2021-05-27
Payer: OTHER MISCELLANEOUS

## 2021-05-27 VITALS
SYSTOLIC BLOOD PRESSURE: 111 MMHG | BODY MASS INDEX: 30.82 KG/M2 | DIASTOLIC BLOOD PRESSURE: 77 MMHG | HEIGHT: 65 IN | WEIGHT: 185 LBS

## 2021-05-27 DIAGNOSIS — M54.41 ACUTE BILATERAL LOW BACK PAIN WITH RIGHT-SIDED SCIATICA: ICD-10-CM

## 2021-05-27 DIAGNOSIS — M54.41 ACUTE BILATERAL LOW BACK PAIN WITH RIGHT-SIDED SCIATICA: Primary | ICD-10-CM

## 2021-05-27 PROCEDURE — 97110 THERAPEUTIC EXERCISES: CPT | Mod: GP | Performed by: PHYSICAL THERAPIST

## 2021-05-27 PROCEDURE — 99213 OFFICE O/P EST LOW 20 MIN: CPT | Performed by: PEDIATRICS

## 2021-05-27 PROCEDURE — 97530 THERAPEUTIC ACTIVITIES: CPT | Mod: GP | Performed by: PHYSICAL THERAPIST

## 2021-05-27 ASSESSMENT — MIFFLIN-ST. JEOR: SCORE: 1590.03

## 2021-05-27 NOTE — PATIENT INSTRUCTIONS
Plan:  - Today's Plan of Care:  Continue Physical Therapy  Work Note Updated    -We also discussed other future treatment options:  Lumbar MRI    Follow Up: 4-6 weeks if needed    If you have any further questions for your physician or physician s care team you can call [phone] and use option 3 to leave a voice message. Calls received during business hours will be returned same day.

## 2021-05-27 NOTE — LETTER
May 27, 2021      Ame Ayala  [address] [place] [address]      To Whom It May Concern,      Ame is under my care for low back injury.  She may return to work on Tuesday June 1st with the following restrictions in place for 2 weeks:  - Seated work half the day (4 hours)  - No restrictions for 4 hour shifts     Then return without restrictions     Continue physical therapy, follow up in 4-6 weeks if needed.     Sincerely,           Sandy Jones MD

## 2021-05-27 NOTE — LETTER
5/27/2021         RE: Ame Ayala  [address]  [place] [address]        Dear Colleague,    Thank you for referring your patient, Ame Ayala, to the [facility]. Please see a copy of my visit note below.    ASSESSMENT & PLAN    Ame was seen today for pain, lumbar/si and follow up.    Diagnoses and all orders for this visit:    Acute bilateral low back pain with right-sided sciatica      This issue is acute and Improving.    Given improvement with update work restrictions, continue physical therapy, follow up as needed.    Plan:  - Today's Plan of Care:  Continue Physical Therapy  Work Note Updated    -We also discussed other future treatment options:  Lumbar MRI    Follow Up: 4-6 weeks if needed    Concerning signs and symptoms were reviewed.  The patient expressed understanding of this management plan and all questions were answered at this time.    Sandy Jones MD [facility]  Sports Medicine Physician  [facility]      SUBJECTIVE- Interim History May 27, 2021    Chief Complaint   Patient presents with     Lower Back - Pain, Lumbar/SI, Follow Up       Ame Ayala is a 25 year old female who is seen in f/u up for Acute bilateral low back pain with right-sided sciatica. Since last visit on 5/6/21, patient has been doing a lot better.  physical therapy is helping a lot. She is only having pain 1-2x/week and it's localized to the low back. Is no longer having shooting pain into leg.  - Now ~ 3.5 weeks from initial injury    Worsened by: flexing, sitting hunched over, crossing the legs over  Better with: physical therapy, resting, posture adjustments  Treatments tried: rest/activity avoidance, ibuprofen, home exercises, physical therapy (3 visits) and previous imaging (xray 5/6/21)  Associated symptoms:  locking or catching    The patient is seen by themselves.  The patient is Right handed    Orthopedic/Surgical history: NO  Social History/Occupation:  "seated work    No family history pertinent to patient's problem today.    REVIEW OF SYSTEMS:  Review of Systems  Skin: no bruising, no swelling  Musculoskeletal: as above  Neurologic: no numbness, paresthesias  Remainder of review of systems is negative including constitutional, CV, pulmonary, GI, except as noted in HPI or medical history.    OBJECTIVE:  /77   Ht 1.651 m (5' 5\")   Wt 83.9 kg (185 lb)   BMI 30.79 kg/m       General: healthy, alert and in no distress  HEENT: no scleral icterus or conjunctival erythema  Skin: no suspicious lesions or rash. No jaundice.  CV: distal perfusion intact  Resp: normal respiratory effort without conversational dyspnea   Psych: normal mood and affect  Gait: normal steady gait with appropriate coordination and balance  Neuro: Normal light sensory exam of lower extremity     Low back exam:  Inspection:     no visible deformity in the low back       normal skin       normal vascular       normal lymphatic       no asymmetry     Posture:      normal     Tender:     mild SI joint     Non Tender:     remainder of lumbar spine     ROM:      improved and normal flexion and extension     Strength:     hip flexion 5/5 bilateral       knee extension 5/5 bilateral       ankle dorsiflexion 5/5 bilateral       ankle plantarflexion 5/5 bilateral       dorsiflexion of the great toe 5/5 bilateral       able to heel and toe walk     Reflexes:     patellar (L3, L4) symmetric normal       achilles tendons (S1) symmetric normal     Sensation:    grossly intact throughout lower extremities     Special tests:      straight leg raise left negative        straight leg raise right negative       neg (-) OSKAR  bilateral       neg (-) FADIR  bilateral    RADIOLOGY:  Final results and radiologist's interpretation, available in the [facility] health record.  Images were reviewed with the patient in the office today.  My personal interpretation of the performed imaging: Prior XR 5/6/2021 - normal, no " degenerative changes    Review of the result(s) of each unique test - XR           Again, thank you for allowing me to participate in the care of your patient.        Sincerely,        Sandy Jones MD

## 2021-05-27 NOTE — PROGRESS NOTES
ASSESSMENT & PLAN    Ame was seen today for pain, lumbar/si and follow up.    Diagnoses and all orders for this visit:    Acute bilateral low back pain with right-sided sciatica      This issue is acute and Improving.    Given improvement with update work restrictions, continue physical therapy, follow up as needed.    Plan:  - Today's Plan of Care:  Continue Physical Therapy  Work Note Updated    -We also discussed other future treatment options:  Lumbar MRI    Follow Up: 4-6 weeks if needed    Concerning signs and symptoms were reviewed.  The patient expressed understanding of this management plan and all questions were answered at this time.    Sandy Jones MD [facility]  Sports Medicine Physician  [facility]      SUBJECTIVE- Interim History May 27, 2021    Chief Complaint   Patient presents with     Lower Back - Pain, Lumbar/SI, Follow Up       Ame Ayala is a 25 year old female who is seen in f/u up for Acute bilateral low back pain with right-sided sciatica. Since last visit on 5/6/21, patient has been doing a lot better.  physical therapy is helping a lot. She is only having pain 1-2x/week and it's localized to the low back. Is no longer having shooting pain into leg.  - Now ~ 3.5 weeks from initial injury    Worsened by: flexing, sitting hunched over, crossing the legs over  Better with: physical therapy, resting, posture adjustments  Treatments tried: rest/activity avoidance, ibuprofen, home exercises, physical therapy (3 visits) and previous imaging (xray 5/6/21)  Associated symptoms:  locking or catching    The patient is seen by themselves.  The patient is Right handed    Orthopedic/Surgical history: NO  Social History/Occupation: seated work    No family history pertinent to patient's problem today.    REVIEW OF SYSTEMS:  Review of Systems  Skin: no bruising, no swelling  Musculoskeletal: as above  Neurologic: no numbness, paresthesias  Remainder of review of systems is negative  "including constitutional, CV, pulmonary, GI, except as noted in HPI or medical history.    OBJECTIVE:  /77   Ht 1.651 m (5' 5\")   Wt 83.9 kg (185 lb)   BMI 30.79 kg/m       General: healthy, alert and in no distress  HEENT: no scleral icterus or conjunctival erythema  Skin: no suspicious lesions or rash. No jaundice.  CV: distal perfusion intact  Resp: normal respiratory effort without conversational dyspnea   Psych: normal mood and affect  Gait: normal steady gait with appropriate coordination and balance  Neuro: Normal light sensory exam of lower extremity     Low back exam:  Inspection:     no visible deformity in the low back       normal skin       normal vascular       normal lymphatic       no asymmetry     Posture:      normal     Tender:     mild SI joint     Non Tender:     remainder of lumbar spine     ROM:      improved and normal flexion and extension     Strength:     hip flexion 5/5 bilateral       knee extension 5/5 bilateral       ankle dorsiflexion 5/5 bilateral       ankle plantarflexion 5/5 bilateral       dorsiflexion of the great toe 5/5 bilateral       able to heel and toe walk     Reflexes:     patellar (L3, L4) symmetric normal       achilles tendons (S1) symmetric normal     Sensation:    grossly intact throughout lower extremities     Special tests:      straight leg raise left negative        straight leg raise right negative       neg (-) OSKAR  bilateral       neg (-) FADIR  bilateral    RADIOLOGY:  Final results and radiologist's interpretation, available in the [facility] health record.  Images were reviewed with the patient in the office today.  My personal interpretation of the performed imaging: Prior XR 5/6/2021 - normal, no degenerative changes    Review of the result(s) of each unique test - XR       "

## 2021-06-02 ENCOUNTER — THERAPY VISIT (OUTPATIENT)
Dept: PHYSICAL THERAPY | Facility: CLINIC | Age: 26
End: 2021-06-02
Payer: OTHER MISCELLANEOUS

## 2021-06-02 DIAGNOSIS — M54.41 ACUTE BILATERAL LOW BACK PAIN WITH RIGHT-SIDED SCIATICA: ICD-10-CM

## 2021-06-02 PROCEDURE — 97110 THERAPEUTIC EXERCISES: CPT | Mod: GP | Performed by: PHYSICAL THERAPIST

## 2021-06-02 PROCEDURE — 97140 MANUAL THERAPY 1/> REGIONS: CPT | Mod: GP | Performed by: PHYSICAL THERAPIST

## 2021-06-09 ENCOUNTER — THERAPY VISIT (OUTPATIENT)
Dept: PHYSICAL THERAPY | Facility: CLINIC | Age: 26
End: 2021-06-09
Payer: OTHER MISCELLANEOUS

## 2021-06-09 DIAGNOSIS — M54.41 ACUTE BILATERAL LOW BACK PAIN WITH RIGHT-SIDED SCIATICA: ICD-10-CM

## 2021-06-09 PROCEDURE — 97110 THERAPEUTIC EXERCISES: CPT | Mod: GP | Performed by: PHYSICAL THERAPIST

## 2021-06-09 PROCEDURE — 97140 MANUAL THERAPY 1/> REGIONS: CPT | Mod: GP | Performed by: PHYSICAL THERAPIST

## 2021-06-22 ENCOUNTER — THERAPY VISIT (OUTPATIENT)
Dept: PHYSICAL THERAPY | Facility: CLINIC | Age: 26
End: 2021-06-22
Payer: OTHER MISCELLANEOUS

## 2021-06-22 ENCOUNTER — TELEPHONE (OUTPATIENT)
Dept: PHYSICAL THERAPY | Facility: CLINIC | Age: 26
End: 2021-06-22

## 2021-06-22 DIAGNOSIS — M54.41 ACUTE BILATERAL LOW BACK PAIN WITH RIGHT-SIDED SCIATICA: ICD-10-CM

## 2021-06-22 PROCEDURE — 97140 MANUAL THERAPY 1/> REGIONS: CPT | Mod: GP | Performed by: PHYSICAL THERAPIST

## 2021-06-22 PROCEDURE — 97110 THERAPEUTIC EXERCISES: CPT | Mod: GP | Performed by: PHYSICAL THERAPIST

## 2021-06-22 NOTE — TELEPHONE ENCOUNTER
Talked to Danii Nunn at [facility] to request 2 additional PT visits for this patient, claim# [id number].  Danii requested that we fax the request over, so we did that today.

## 2021-06-22 NOTE — PROGRESS NOTES
Subjective:  HPI  Physical Exam                    Objective:  System    Physical Exam    General     ROS    Assessment/Plan:    PROGRESS  REPORT    Progress reporting period is from 5/17/2021 to 6/22/2021.       SUBJECTIVE  Subjective changes noted by patient:  Pt reports that she was cleared to go back to work yesterday, but will only be starting back at 1/2 days for the next week.  She is nervous about going back full-time and dealing with lifting kids, etc.  So she will be meeting with her doctor to see if she can keep a 1/2 day schedule for longer.  She has been more consistent with her HEP over the past week - she has done about 20 reps/day.  She did try the standing supported lumbar extension exercise and it helpled, but not as much as the prone press-up exercise.  She only had one instance of pain radiating into her R LE - otherwise the pain has been just in the low back.    Current pain level is 0/10.     Previous pain level was 4/10.   Changes in function:  Yes (See Goal flowsheet attached for changes in current functional level)  Adverse reaction to treatment or activity: None    OBJECTIVE  Changes noted in objective findings:  Yes, see below.  Objective: lumbar AROM flex WNL, ext WNL (slight tightness), B SG WNL.      ASSESSMENT/PLAN  Updated problem list and treatment plan: Diagnosis 1:  LBP secondary to derangement  Pain -  directional preference exercise and home program  Decreased ROM/flexibility - manual therapy, therapeutic exercise and home program  Decreased function - therapeutic activities and home program  Impaired posture - neuro re-education and home program  STG/LTGs have been met or progress has been made towards goals:  Yes (See Goal flow sheet completed today.)  Assessment of Progress: The patient's condition is improving.  The patient's condition has potential to improve.  Self Management Plans:  Patient has been instructed in a home treatment program.  I have re-evaluated this patient  and find that the nature, scope, duration and intensity of the therapy is appropriate for the medical condition of the patient.  Ame continues to require the following intervention to meet STG and LTG's:  PT    Recommendations:  This patient would benefit from continued therapy.     Frequency:  2 X a month, once daily  Duration:  for 1 months to build core strength in order to return full-time back to work        Please refer to the daily flowsheet for treatment today, total treatment time and time spent performing 1:1 timed codes.

## 2021-06-22 NOTE — LETTER
AMINA [address]  [address]  [facility] [address]  [phone]    2021    Re: Ame Ayala   :   1995  MRN:  [id number]   REFERRING PHYSICIAN:   Sandy HUYNH [facility]    Date of Initial Evaluation:  21  Visits:  Rxs Used: 6  Reason for Referral:  Acute bilateral low back pain with right-sided sciatica       PROGRESS  REPORT    Progress reporting period is from 2021 to 2021.         SUBJECTIVE    Subjective changes noted by patient:  Pt reports that she was cleared to go back to work yesterday, but will only be starting back at 1/2 days for the next week.  She is nervous about going back full-time and dealing with lifting kids, etc.  So she will be meeting with her doctor to see if she can keep a 1/2 day schedule for longer.  She has been more consistent with her HEP over the past week - she has done about 20 reps/day.  She did try the standing supported lumbar extension exercise and it helpled, but not as much as the prone press-up exercise.  She only had one instance of pain radiating into her R LE - otherwise the pain has been just in the low back.    Current pain level is 0/10.     Previous pain level was 4/10.   Changes in function:  Yes (See Goal flowsheet attached for changes in current functional level)  Adverse reaction to treatment or activity: None    OBJECTIVE    Changes noted in objective findings:  Yes, see below.  Objective: lumbar AROM flex WNL, ext WNL (slight tightness), B SG WNL.                        Re: Ame Ayala   :   1995            ASSESSMENT/PLAN    Updated problem list and treatment plan: Diagnosis 1:  LBP secondary to derangement  Pain -  directional preference exercise and home program  Decreased ROM/flexibility - manual therapy, therapeutic exercise and home program  Decreased function - therapeutic activities and home  program  Impaired posture - neuro re-education and home program  STG/LTGs have been met or progress has been made towards goals:  Yes (See Goal flow sheet completed today.)  Assessment of Progress: The patient's condition is improving.  The patient's condition has potential to improve.  Self Management Plans:  Patient has been instructed in a home treatment program.  I have re-evaluated this patient and find that the nature, scope, duration and intensity of the therapy is appropriate for the medical condition of the patient.  Ame continues to require the following intervention to meet STG and LTG's:  PT    Recommendations:    This patient would benefit from continued therapy.     Frequency:  2 X a month, once daily  Duration:  for 1 months to build core strength in order to return full-time back to work    Thank you for your referral.    INQUIRIES  Therapist: Sonia Austin DPT,Cert,MDT   [facility] SERVICES [address]  [address]  [facility] [address]  Phone: [phone]  Fax: [phone]

## 2021-06-24 ENCOUNTER — TELEPHONE (OUTPATIENT)
Dept: PHYSICAL THERAPY | Facility: CLINIC | Age: 26
End: 2021-06-24

## 2021-06-24 DIAGNOSIS — M54.41 ACUTE BILATERAL LOW BACK PAIN WITH RIGHT-SIDED SCIATICA: ICD-10-CM

## 2021-06-24 NOTE — TELEPHONE ENCOUNTER
Received a fax today from [facility] that authorized 2 additional PT sessions for this patient (total of 8).

## 2021-07-02 ENCOUNTER — VIRTUAL VISIT (OUTPATIENT)
Dept: ORTHOPEDICS | Facility: CLINIC | Age: 26
End: 2021-07-02
Payer: OTHER MISCELLANEOUS

## 2021-07-02 DIAGNOSIS — M54.41 ACUTE BILATERAL LOW BACK PAIN WITH RIGHT-SIDED SCIATICA: Primary | ICD-10-CM

## 2021-07-02 PROCEDURE — 99212 OFFICE O/P EST SF 10 MIN: CPT | Mod: 95 | Performed by: PEDIATRICS

## 2021-07-02 NOTE — LETTER
July 2, 2021      Ame VALVERDE Lucy  [address] [facility] [address]        To Whom It May Concern,     Ame is under my care for low back injury.  She may return to work without restrictions next available shift.  Continue physical therapy.    Follow up 4-6 weeks as needed.      Sincerely,        Sandy Jones MD

## 2021-07-02 NOTE — PATIENT INSTRUCTIONS
Plan:  - Today's Plan of Care:  Continue PT and Home Exercise Program  Work Letter Updated    -We also discussed other future treatment options:  Lumbar MRI    Follow Up: 1 month and as needed    If you have any further questions for your physician or physician s care team you can call [phone] and use option 3 to leave a voice message. Calls received during business hours will be returned same day.

## 2021-07-02 NOTE — PROGRESS NOTES
Ame is a 25 year old who is being evaluated via a billable video visit.      How would you like to obtain your AVS? MyChart  If the video visit is dropped, the invitation should be resent by: Text to cell phone: [phone]  Will anyone else be joining your video visit? No      Video Start Time: unable  Video-Visit Details    Type of service:  Video Visit    Video End Time:unable    Originating Location (pt. Location): Home    Distant Location (provider location):  [facility]     Platform used for Video Visit: Unable to complete video visit   - Switched to Telephone Call ~ 7 mins    ASSESSMENT & PLAN    Diagnoses and all orders for this visit:    Acute bilateral low back pain with right-sided sciatica      This issue is acute and Improving.    Plan:  - Today's Plan of Care:  Continue PT and Home Exercise Program  Work Letter Updated    -We also discussed other future treatment options:  Lumbar MRI    Follow Up: 1 month and as needed    Concerning signs and symptoms were reviewed.  The patient expressed understanding of this management plan and all questions were answered at this time.    Sandy Jones MD [facility]  Sports Medicine Physician  [facility]      SUBJECTIVE- Interim History July 2, 2021    No chief complaint on file.      Ame Ayala is a 25 year old female who is seen in f/u up for Acute bilateral low back pain with right-sided sciatica. Since last visit on 5/27/21, patient has been doing a lot better.  She is feeling minimal back pain, going to physical therapy which has been helping.  Minimal low back pain, nothing into leg, once and a while will shift to upper back. She has been allowed to return to work without limitations.  - Has a couple more PT visits.  - Now ~ 8.5 weeks from initial injury    Switched to telephone visit ~ 7mins    Worsened by: strenuous exercise, excessive flexion   Better with: physical therapy, resting, posture  adjustments  Treatments tried: rest/activity avoidance, ibuprofen, home exercises, physical therapy (3 visits) and previous imaging (xray 5/6/21)  Associated symptoms:  locking or catching occasionally    Orthopedic/Surgical history: NO  Social History/Occupation: seated work    No family history pertinent to patient's problem today.    REVIEW OF SYSTEMS:  Review of Systems  Skin: no bruising, no swelling  Musculoskeletal: as above  Neurologic: no numbness, paresthesias  Remainder of review of systems is negative including constitutional, CV, pulmonary, GI, except as noted in HPI or medical history.      RADIOLOGY:  Final results and radiologist's interpretation, available in the [facility] health record.  Images were reviewed with the patient in the office today.  My personal interpretation of the performed imaging: Prior XR 5/6/2021 - normal, no degenerative changes      Review of the result(s) of each unique test - XR

## 2021-07-02 NOTE — LETTER
7/2/2021         RE: Ame Ayala  [address]  [facility] [address]        Dear Colleague,    Thank you for referring your patient, Ame Ayala, to the [facility]. Please see a copy of my visit note below.    Ame is a 25 year old who is being evaluated via a billable video visit.      How would you like to obtain your AVS? MyChart  If the video visit is dropped, the invitation should be resent by: Text to cell phone: [phone]  Will anyone else be joining your video visit? No      Video Start Time: unable  Video-Visit Details    Type of service:  Video Visit    Video End Time:unable    Originating Location (pt. Location): Home    Distant Location (provider location):  [facility]     Platform used for Video Visit: Unable to complete video visit   - Switched to Telephone Call ~ 7 mins    ASSESSMENT & PLAN    Diagnoses and all orders for this visit:    Acute bilateral low back pain with right-sided sciatica      This issue is acute and Improving.    Plan:  - Today's Plan of Care:  Continue PT and Home Exercise Program  Work Letter Updated    -We also discussed other future treatment options:  Lumbar MRI    Follow Up: 1 month and as needed    Concerning signs and symptoms were reviewed.  The patient expressed understanding of this management plan and all questions were answered at this time.    Sandy Jones MD [facility]  Sports Medicine Physician  [facility]      SUBJECTIVE- Interim History July 2, 2021    No chief complaint on file.      Ame Ayala is a 25 year old female who is seen in f/u up for Acute bilateral low back pain with right-sided sciatica. Since last visit on 5/27/21, patient has been doing a lot better.  She is feeling minimal back pain, going to physical therapy which has been helping.  Minimal low back pain, nothing into leg, once and a while will shift to upper back. She has been allowed to  return to work without limitations.  - Has a couple more PT visits.  - Now ~ 8.5 weeks from initial injury    Switched to telephone visit ~ 7mins    Worsened by: strenuous exercise, excessive flexion   Better with: physical therapy, resting, posture adjustments  Treatments tried: rest/activity avoidance, ibuprofen, home exercises, physical therapy (3 visits) and previous imaging (xray 5/6/21)  Associated symptoms:  locking or catching occasionally    Orthopedic/Surgical history: NO  Social History/Occupation: seated work    No family history pertinent to patient's problem today.    REVIEW OF SYSTEMS:  Review of Systems  Skin: no bruising, no swelling  Musculoskeletal: as above  Neurologic: no numbness, paresthesias  Remainder of review of systems is negative including constitutional, CV, pulmonary, GI, except as noted in HPI or medical history.      RADIOLOGY:  Final results and radiologist's interpretation, available in the [facility] health record.  Images were reviewed with the patient in the office today.  My personal interpretation of the performed imaging: Prior XR 5/6/2021 - normal, no degenerative changes      Review of the result(s) of each unique test - XR           Again, thank you for allowing me to participate in the care of your patient.        Sincerely,        Sandy Jones MD

## 2021-07-05 ENCOUNTER — THERAPY VISIT (OUTPATIENT)
Dept: PHYSICAL THERAPY | Facility: CLINIC | Age: 26
End: 2021-07-05
Payer: OTHER MISCELLANEOUS

## 2021-07-05 DIAGNOSIS — M54.41 ACUTE BILATERAL LOW BACK PAIN WITH RIGHT-SIDED SCIATICA: ICD-10-CM

## 2021-07-05 PROCEDURE — 97110 THERAPEUTIC EXERCISES: CPT | Mod: GP | Performed by: PHYSICAL THERAPIST

## 2021-07-05 PROCEDURE — 97140 MANUAL THERAPY 1/> REGIONS: CPT | Mod: GP | Performed by: PHYSICAL THERAPIST

## 2021-07-05 NOTE — LETTER
"AMINA [address]  [address]  [facility] [address]  [phone]    November 3, 2021    Re: Ame Ayala   :   1995  MRN:  [id number]   REFERRING PHYSICIAN:   Sandy HUYNH [facility]    Date of Initial Evaluation:  21  Visits:  Rxs Used: 7  Reason for Referral:  Acute bilateral low back pain with right-sided sciatica    DISCHARGE REPORT    Progress reporting period is from 2021 to 2021.       SUBJECTIVE    Subjective:  When last seen on 2021, patient reported her back had been doing really well until not doing the exercises as much over the past few days due to feeling better.  She started having more LB soreness since not doing them as much.  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  She still had soreness and stiffness with sitting for >2 hours.  Current status is unknown as patient did not return for additional follow-up visits.  Current Pain level:  As of 2021:  0/10.     Initial Pain level: 4/10.   Changes in function:  Unknown as patient did not return for additional follow-up visits.  Adverse reaction to treatment or activity:  Unknown as patient did not return for additional follow-up visits.    OBJECTIVE    Objective: Lumbar AROM:  flexion nil loss, \"stiff\"; extension min loss, \"stiff\"; B SG nil loss, \"stiff\" both directions     ASSESSMENT/PLAN    Patient was seen for 7 visits with treatment focusing on lumbar extension exercises and mobilizations in addition to core strengthening, body mechanics training, and posture training to address LBP.  She made good progress throughout treatment and demonstrated improvements with both pain and function.  She has not returned for additional follow-up visits so current assessment is unavailable.  Updated problem list and " treatment plan: Diagnosis 1:  LBP   No updated problem list or treatment plan as patient did not return for additional visits and is discharged from PT at this time.        Re: Ame Ayala   :   1995        STG/LTGs have been met or progress has been made towards goals:  Unknown as patient did not return for additional follow-up visits.Assessment of Progress: Patient has not returned to therapy.  Current status is unknown and discharge G code cannot be reported.  Self Management Plans:  Patient has been instructed in a home treatment program.  Patient  has been instructed in self management of symptoms.  Ame continues to require the following intervention to meet STG and LTG's:  PT intervention is no longer required to meet STG/LTG.    Recommendations:    Patient is discharged from PT as she did not return for further follow-up visits.    Thank you for your referral.    INQUIRIES  Therapist: Arielle Sullivan PT   [facility] SERVICES [address]  [address]  [facility] [address]  Phone: [phone]  Fax: [phone]

## 2021-09-16 ENCOUNTER — ANCILLARY PROCEDURE (OUTPATIENT)
Dept: GENERAL RADIOLOGY | Facility: CLINIC | Age: 26
End: 2021-09-16
Attending: NURSE PRACTITIONER
Payer: COMMERCIAL

## 2021-09-16 ENCOUNTER — LAB (OUTPATIENT)
Dept: LAB | Facility: CLINIC | Age: 26
End: 2021-09-16
Payer: COMMERCIAL

## 2021-09-16 ENCOUNTER — OFFICE VISIT (OUTPATIENT)
Dept: FAMILY MEDICINE | Facility: CLINIC | Age: 26
End: 2021-09-16
Payer: COMMERCIAL

## 2021-09-16 VITALS
DIASTOLIC BLOOD PRESSURE: 73 MMHG | TEMPERATURE: 98 F | WEIGHT: 185 LBS | HEART RATE: 76 BPM | OXYGEN SATURATION: 95 % | RESPIRATION RATE: 16 BRPM | BODY MASS INDEX: 30.82 KG/M2 | HEIGHT: 65 IN | SYSTOLIC BLOOD PRESSURE: 109 MMHG

## 2021-09-16 DIAGNOSIS — S61.258A CAT BITE OF INDEX FINGER, INITIAL ENCOUNTER: ICD-10-CM
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
## 2021-09-16 DIAGNOSIS — Z23 NEED FOR DIPHTHERIA-TETANUS-PERTUSSIS (TDAP) VACCINE: ICD-10-CM
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
## 2021-09-16 DIAGNOSIS — S61.258A CAT BITE OF INDEX FINGER, INITIAL ENCOUNTER: Primary | ICD-10-CM

## 2021-09-16 LAB
BASOPHILS # BLD AUTO: 0 10E3/UL (ref 0–0.2)
BASOPHILS NFR BLD AUTO: 1 %
EOSINOPHIL # BLD AUTO: 0.2 10E3/UL (ref 0–0.7)
EOSINOPHIL NFR BLD AUTO: 2 %
ERYTHROCYTE [DISTWIDTH] IN BLOOD BY AUTOMATED COUNT: 12.3 % (ref 10–15)
HCT VFR BLD AUTO: 41 % (ref 35–47)
HGB BLD-MCNC: 13 G/DL (ref 11.7–15.7)
IMM GRANULOCYTES # BLD: 0 10E3/UL
IMM GRANULOCYTES NFR BLD: 0 %
LYMPHOCYTES # BLD AUTO: 2 10E3/UL (ref 0.8–5.3)
LYMPHOCYTES NFR BLD AUTO: 25 %
MCH RBC QN AUTO: 27.5 PG (ref 26.5–33)
MCHC RBC AUTO-ENTMCNC: 31.7 G/DL (ref 31.5–36.5)
MCV RBC AUTO: 87 FL (ref 78–100)
MONOCYTES # BLD AUTO: 0.7 10E3/UL (ref 0–1.3)
MONOCYTES NFR BLD AUTO: 9 %
NEUTROPHILS # BLD AUTO: 5.2 10E3/UL (ref 1.6–8.3)
NEUTROPHILS NFR BLD AUTO: 63 %
NRBC # BLD AUTO: 0 10E3/UL
NRBC BLD AUTO-RTO: 0 /100
PLATELET # BLD AUTO: 414 10E3/UL (ref 150–450)
RBC # BLD AUTO: 4.72 10E6/UL (ref 3.8–5.2)
WBC # BLD AUTO: 8.1 10E3/UL (ref 4–11)

## 2021-09-16 PROCEDURE — 90715 TDAP VACCINE 7 YRS/> IM: CPT | Performed by: NURSE PRACTITIONER

## 2021-09-16 PROCEDURE — 90471 IMMUNIZATION ADMIN: CPT | Performed by: NURSE PRACTITIONER

## 2021-09-16 PROCEDURE — 73140 X-RAY EXAM OF FINGER(S): CPT | Mod: LT | Performed by: RADIOLOGY

## 2021-09-16 PROCEDURE — 99212 OFFICE O/P EST SF 10 MIN: CPT | Mod: 25 | Performed by: NURSE PRACTITIONER

## 2021-09-16 PROCEDURE — 85025 COMPLETE CBC W/AUTO DIFF WBC: CPT | Performed by: PATHOLOGY

## 2021-09-16 ASSESSMENT — ANXIETY QUESTIONNAIRES
8. IF YOU CHECKED OFF ANY PROBLEMS, HOW DIFFICULT HAVE THESE MADE IT FOR YOU TO DO YOUR WORK, TAKE CARE OF THINGS AT HOME, OR GET ALONG WITH OTHER PEOPLE?: SOMEWHAT DIFFICULT
GAD7 TOTAL SCORE: 10
3. WORRYING TOO MUCH ABOUT DIFFERENT THINGS: SEVERAL DAYS
6. BECOMING EASILY ANNOYED OR IRRITABLE: NEARLY EVERY DAY
2. NOT BEING ABLE TO STOP OR CONTROL WORRYING: SEVERAL DAYS
7. FEELING AFRAID AS IF SOMETHING AWFUL MIGHT HAPPEN: NEARLY EVERY DAY
4. TROUBLE RELAXING: NOT AT ALL
GAD7 TOTAL SCORE: 10
1. FEELING NERVOUS, ANXIOUS, OR ON EDGE: MORE THAN HALF THE DAYS
GAD7 TOTAL SCORE: 10
5. BEING SO RESTLESS THAT IT IS HARD TO SIT STILL: NOT AT ALL
7. FEELING AFRAID AS IF SOMETHING AWFUL MIGHT HAPPEN: NEARLY EVERY DAY

## 2021-09-16 ASSESSMENT — PAIN SCALES - GENERAL: PAINLEVEL: NO PAIN (0)

## 2021-09-16 ASSESSMENT — MIFFLIN-ST. JEOR: SCORE: 1585.03

## 2021-09-16 NOTE — NURSING NOTE
Chief Complaint   Patient presents with     Trauma     Patient complains of a cat bite.         Uriel Ovalles MA on 9/16/2021 at 1:40 PM

## 2021-09-16 NOTE — PROGRESS NOTES
"Assessment & Plan     Cat bite of index finger, initial encounter  - CBC with platelets differential  - amoxicillin-clavulanate (AUGMENTIN) 875-125 MG tablet  Dispense: 14 tablet; Refill: 0    Need for diphtheria-tetanus-pertussis (Tdap) vaccine  - TDAP VACCINE (Adacel, Boostrix)  [[id number]]      19 minutes spent on the date of the encounter doing chart review, history and exam, documentation and further activities per the note      Tobacco Cessation:   reports that she has been smoking vaping device. She has never used smokeless tobacco.  Tobacco Cessation Action Plan: not discussed at the appointment     BMI:   Estimated body mass index is 30.79 kg/m  as calculated from the following:    Height as of this encounter: 1.651 m (5' 5\").    Weight as of this encounter: 83.9 kg (185 lb).   Weight management plan not discussed at today's appointment    See Patient Instructions  Augmentin, warm compresses. Area of redness marked.    Discussed s/s requiring urgent evaluation.  Follow-up in no improvement in 48 to 72 hours - follow-up sooner if symptoms worsen or develops a fever  Return to clinic if no improvement or symptoms worsen.  Patient verbalized understanding & agreed with plan of care.    KATIE Burris, CNP  [facility] REZA Mccloud is a 26 year old who presents for the following health issues  accompanied by herself:    HPI   Patient was bit by her roommate's cat yesterday on the right index finger.  Her finger is now red and swollen.   She states she has noticed some drainage from the puncture wound.  The cat is up-to-date on vaccinations.  She declines fever, chills, or body aches. She declines any questions or concerns.    Review of Systems   Constitutional, HEENT, cardiovascular, pulmonary, gi and gu systems are negative, except as otherwise noted.      Objective    /73 (BP Location: Right arm, Patient Position: Sitting, Cuff Size: Adult " "Regular)   Pulse 76   Temp 98  F (36.7  C) (Oral)   Resp 16   Ht 1.651 m (5' 5\")   Wt 83.9 kg (185 lb)   SpO2 95%   BMI 30.79 kg/m    Body mass index is 30.79 kg/m .  Physical Exam   GENERAL: healthy, alert and no distress  MS: right hand - right index finger swelling noted swelling of the PIP.  No warmth noted. Minimally reduced AROM of right index finger due to swelling. Good strength of hand.   PSYCH: mentation appears normal, affect normal/bright    Results for orders placed or performed in visit on 09/16/21   CBC with platelets and differential     Status: None   Result Value Ref Range    WBC Count 8.1 4.0 - 11.0 10e3/uL    RBC Count 4.72 3.80 - 5.20 10e6/uL    Hemoglobin 13.0 11.7 - 15.7 g/dL    Hematocrit 41.0 35.0 - 47.0 %    MCV 87 78 - 100 fL    MCH 27.5 26.5 - 33.0 pg    MCHC 31.7 31.5 - 36.5 g/dL    RDW 12.3 10.0 - 15.0 %    Platelet Count 414 150 - 450 10e3/uL    % Neutrophils 63 %    % Lymphocytes 25 %    % Monocytes 9 %    % Eosinophils 2 %    % Basophils 1 %    % Immature Granulocytes 0 %    NRBCs per 100 WBC 0 <1 /100    Absolute Neutrophils 5.2 1.6 - 8.3 10e3/uL    Absolute Lymphocytes 2.0 0.8 - 5.3 10e3/uL    Absolute Monocytes 0.7 0.0 - 1.3 10e3/uL    Absolute Eosinophils 0.2 0.0 - 0.7 10e3/uL    Absolute Basophils 0.0 0.0 - 0.2 10e3/uL    Absolute Immature Granulocytes 0.0 <=0.0 10e3/uL    Absolute NRBCs 0.0 10e3/uL   CBC with platelets differential     Status: None    Narrative    The following orders were created for panel order CBC with platelets differential.  Procedure                               Abnormality         Status                     ---------                               -----------         ------                     CBC with platelets and d...[[id number]]                      Final result                 Please view results for these tests on the individual orders.   Results for orders placed or performed in visit on 09/16/21   XR Finger Left G/E 2 Views     Status: None "    Narrative    EXAM: XR FINGER LEFT G/E 2 VIEWS  9/16/2021 4:01 PM      HISTORY: Index finger swelling and redness after a cat bite; Cat bite  of index finger, initial encounter; Cat bite of index finger, initial  encounter    COMPARISON: None    FINDINGS: 3 views of the left second digit.    No acute osseous abnormality. Soft tissue swelling about the second  proximal phalanx, especially radially. No substantial degenerative  change. No subcutaneous gas.       Impression    IMPRESSION: Soft tissue swelling of the second digit about the  proximal phalanx. No acute osseous abnormality.    ARSEN NORWOOD MD (Joe)         SYSTEM ID:  [id number]         Answers for HPI/ROS submitted by the patient on 9/16/2021  LEEANN 7 TOTAL SCORE: 10

## 2021-09-17 ASSESSMENT — ANXIETY QUESTIONNAIRES: GAD7 TOTAL SCORE: 10

## 2021-10-24 ENCOUNTER — HEALTH MAINTENANCE LETTER (OUTPATIENT)
Age: 26
End: 2021-10-24

## 2021-10-28 ENCOUNTER — OFFICE VISIT (OUTPATIENT)
Dept: OPHTHALMOLOGY | Facility: CLINIC | Age: 26
End: 2021-10-28
Attending: OPHTHALMOLOGY
Payer: COMMERCIAL

## 2021-10-28 ENCOUNTER — TELEPHONE (OUTPATIENT)
Dept: OPHTHALMOLOGY | Facility: CLINIC | Age: 26
End: 2021-10-28

## 2021-10-28 ENCOUNTER — NURSE TRIAGE (OUTPATIENT)
Dept: NURSING | Facility: CLINIC | Age: 26
End: 2021-10-28

## 2021-10-28 DIAGNOSIS — H33.312 RETINAL TEAR OF LEFT EYE: ICD-10-CM

## 2021-10-28 DIAGNOSIS — S05.02XA ABRASION OF LEFT CORNEA, INITIAL ENCOUNTER: Primary | ICD-10-CM

## 2021-10-28 PROCEDURE — 92250 FUNDUS PHOTOGRAPHY W/I&R: CPT | Performed by: OPHTHALMOLOGY

## 2021-10-28 PROCEDURE — 67145 PROPH RTA DTCHMNT PC: CPT | Mod: LT | Performed by: OPHTHALMOLOGY

## 2021-10-28 PROCEDURE — G0463 HOSPITAL OUTPT CLINIC VISIT: HCPCS

## 2021-10-28 PROCEDURE — 99204 OFFICE O/P NEW MOD 45 MIN: CPT | Mod: 57 | Performed by: OPHTHALMOLOGY

## 2021-10-28 RX ORDER — BACITRACIN ZINC AND POLYMYXIN B SULFATE 500; 10000 [USP'U]/G; [USP'U]/G
OINTMENT OPHTHALMIC 3 TIMES DAILY
Qty: 3.5 G | Refills: 0 | Status: SHIPPED | OUTPATIENT
Start: 2021-10-28 | End: 2021-11-15

## 2021-10-28 ASSESSMENT — TONOMETRY
OS_IOP_MMHG: 19
OD_IOP_MMHG: 17
IOP_METHOD: TONOPEN

## 2021-10-28 ASSESSMENT — VISUAL ACUITY
OD_SC: 20/15
METHOD: SNELLEN - LINEAR
OS_SC+: +1
OD_SC+: -1
OS_SC: 20/20

## 2021-10-28 ASSESSMENT — EXTERNAL EXAM - LEFT EYE: OS_EXAM: NORMAL

## 2021-10-28 ASSESSMENT — CONF VISUAL FIELD
OS_NORMAL: 1
OD_NORMAL: 1
METHOD: COUNTING FINGERS

## 2021-10-28 ASSESSMENT — EXTERNAL EXAM - RIGHT EYE: OD_EXAM: NORMAL

## 2021-10-28 ASSESSMENT — SLIT LAMP EXAM - LIDS
COMMENTS: NORMAL
COMMENTS: NORMAL
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
## 2021-11-01 NOTE — PROGRESS NOTES
"Subjective:  HPI  Physical Exam                    Objective:  System    Physical Exam    General     ROS    Assessment/Plan:    DISCHARGE REPORT    Progress reporting period is from 06/22/2021 to 07/05/2021.       SUBJECTIVE  Subjective:  When last seen on 07/05/2021, patient reported her back had been doing really well until not doing the exercises as much over the past few days due to feeling better.  She started having more LB soreness since not doing them as much.  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  She still had soreness and stiffness with sitting for >2 hours.  Current status is unknown as patient did not return for additional follow-up visits.  Current Pain level:  As of 07/05/2021:  0/10.     Initial Pain level: 4/10.   Changes in function:  Unknown as patient did not return for additional follow-up visits.  Adverse reaction to treatment or activity:  Unknown as patient did not return for additional follow-up visits.    OBJECTIVE  Objective: Lumbar AROM:  flexion nil loss, \"stiff\"; extension min loss, \"stiff\"; B SG nil loss, \"stiff\" both directions     ASSESSMENT/PLAN  Patient was seen for 7 visits with treatment focusing on lumbar extension exercises and mobilizations in addition to core strengthening, body mechanics training, and posture training to address LBP.  She made good progress throughout treatment and demonstrated improvements with both pain and function.  She has not returned for additional follow-up visits so current assessment is unavailable.  Updated problem list and treatment plan: Diagnosis 1:  LBP   No updated problem list or treatment plan as patient did not return for additional visits and is discharged from PT at this time.    STG/LTGs have been met or progress has been made towards goals:  Unknown as patient did not return for additional follow-up visits.Assessment of Progress: Patient has " not returned to therapy.  Current status is unknown and discharge G code cannot be reported.  Self Management Plans:  Patient has been instructed in a home treatment program.  Patient  has been instructed in self management of symptoms.  Ame continues to require the following intervention to meet STG and LTG's:  PT intervention is no longer required to meet STG/LTG.    Recommendations:  Patient is discharged from PT as she did not return for further follow-up visits.    Please refer to the daily flowsheet for treatment today, total treatment time and time spent performing 1:1 timed codes.

## 2021-11-08 ENCOUNTER — TELEPHONE (OUTPATIENT)
Dept: OPHTHALMOLOGY | Facility: CLINIC | Age: 26
End: 2021-11-08
Payer: COMMERCIAL

## 2021-11-08 NOTE — TELEPHONE ENCOUNTER
Called and LVM for Ame about r/s her appt with Dr. Hartmann. I was unable to reach Ame , but on her VM I did offer Dr. Hartmann's next available 11/24.     Can we add her on a sooner date?       Nayana Ordonez Communication Facilitator on 11/8/2021 at 2:01 PM

## 2021-11-08 NOTE — TELEPHONE ENCOUNTER
Health Call Center    Phone Message    May a detailed message be left on voicemail: yes     Reason for Call: Other: Pt is scheduled for 2-3 week f/u with Dr. Hartmann for Wednesday (11/10). She has tested positive for covid and her quarantine period will be over on Friday (11/12). Pt is looking to r/s this Appt for next week, but next availability is 11/24. Please call pt to discuss r/s options. Thank you.     Action Taken: Message routed to:  [facility] EYE    Travel Screening: Not Applicable

## 2021-11-15 ENCOUNTER — OFFICE VISIT (OUTPATIENT)
Dept: OPHTHALMOLOGY | Facility: CLINIC | Age: 26
End: 2021-11-15
Attending: OPHTHALMOLOGY
Payer: COMMERCIAL

## 2021-11-15 ENCOUNTER — NURSE TRIAGE (OUTPATIENT)
Dept: NURSING | Facility: CLINIC | Age: 26
End: 2021-11-15
Payer: COMMERCIAL

## 2021-11-15 DIAGNOSIS — G43.109 MIGRAINE WITH AURA AND WITHOUT STATUS MIGRAINOSUS, NOT INTRACTABLE: Primary | ICD-10-CM

## 2021-11-15 DIAGNOSIS — Z98.890 HISTORY OF REPAIR OF RETINAL DEFECT BY LASER PHOTOCOAGULATION: ICD-10-CM

## 2021-11-15 PROCEDURE — 99204 OFFICE O/P NEW MOD 45 MIN: CPT | Mod: GC | Performed by: STUDENT IN AN ORGANIZED HEALTH CARE EDUCATION/TRAINING PROGRAM

## 2021-11-15 PROCEDURE — G0463 HOSPITAL OUTPT CLINIC VISIT: HCPCS

## 2021-11-15 PROCEDURE — 92134 CPTRZ OPH DX IMG PST SGM RTA: CPT | Mod: 26 | Performed by: STUDENT IN AN ORGANIZED HEALTH CARE EDUCATION/TRAINING PROGRAM

## 2021-11-15 PROCEDURE — 92250 FUNDUS PHOTOGRAPHY W/I&R: CPT | Performed by: STUDENT IN AN ORGANIZED HEALTH CARE EDUCATION/TRAINING PROGRAM

## 2021-11-15 PROCEDURE — 92134 CPTRZ OPH DX IMG PST SGM RTA: CPT | Performed by: STUDENT IN AN ORGANIZED HEALTH CARE EDUCATION/TRAINING PROGRAM

## 2021-11-15 ASSESSMENT — EXTERNAL EXAM - LEFT EYE: OS_EXAM: NORMAL

## 2021-11-15 ASSESSMENT — VISUAL ACUITY
OS_SC: 20/15
OS_SC+: -3
METHOD: SNELLEN - LINEAR
OD_SC: 20/15

## 2021-11-15 ASSESSMENT — CUP TO DISC RATIO
OD_RATIO: 0.2
OS_RATIO: 0.25

## 2021-11-15 ASSESSMENT — CONF VISUAL FIELD
OD_NORMAL: 1
METHOD: COUNTING FINGERS
OS_NORMAL: 1

## 2021-11-15 ASSESSMENT — TONOMETRY
OD_IOP_MMHG: 19
IOP_METHOD: TONOPEN
OS_IOP_MMHG: 17

## 2021-11-15 ASSESSMENT — EXTERNAL EXAM - RIGHT EYE: OD_EXAM: NORMAL

## 2021-11-15 ASSESSMENT — SLIT LAMP EXAM - LIDS
COMMENTS: NORMAL
COMMENTS: NORMAL

## 2021-11-15 NOTE — NURSING NOTE
Chief Complaints and History of Present Illnesses   Patient presents with     Corneal Abrasion Follow Up     Chief Complaint(s) and History of Present Illness(es)     Corneal Abrasion Follow Up     Laterality: left eye    Pain scale: 0/10    Course: stable    Response to treatment: no improvement              Comments     Ame is here to follow up on a Abrasion of left cornea, initial encounter.  She says last night she has about two hours of flashes LE and today has a headache and sees floaters MANDY Hughes COT 3:15 PM November 15, 2021
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
## 2021-11-15 NOTE — TELEPHONE ENCOUNTER
"Can be reached at [phone] today    Reason for Disposition    Flashes of light  (Exception: brief from pressing on the eyeball)    Eye pain and brief (now gone) blurred vision or visual changes    Additional Information    Negative: Weakness of the face, arm or leg on one side of the body    Negative: Followed getting substance in the eye    Negative: Foreign body or object is or was lodged in the eye    Negative: Followed an eye injury    Negative: Followed sun lamp or sun exposure (UV keratitis)    Negative: Yellow or green discharge (pus) in the eye    Negative: Pregnant    Negative: Complete loss of vision in 1 or both eyes    Negative: Severe eye pain    Negative: Severe headache    Negative: Double vision    Negative: Blurred vision or visual changes and present now and sudden onset or new (e.g., minutes, hours, days) (Exception: seeing floaters / black specks OR previously diagnosed migraine headaches with same symptoms)    Negative: Patient sounds very sick or weak to the triager    Answer Assessment - Initial Assessment Questions  1. DESCRIPTION: \"What is the vision loss like? Describe it for me.\" (e.g., complete vision loss, blurred vision, double vision, floaters, etc.)      Is experincing flashes of light at this time and last night for 1.5 hours  2. LOCATION: \"One or both eyes?\" If one, ask: \"Which eye?\"      Left eye  3. SEVERITY: \"Can you see anything?\" If so, ask: \"What can you see?\" (e.g., fine print)      Is able to see, flashes resolve when opens eye  4. ONSET: \"When did this begin?\" \"Did it start suddenly or has this been gradual?\"      Began suddenly during the night  5. PATTERN: \"Does this come and go, or has it been constant since it started?\"      intermittent  6. PAIN: \"Is there any pain in your eye(s)?\"  (Scale 1-10; or mild, moderate, severe)      Yes 3/10 constant left eye  7. CONTACTS-GLASSES: \"Do you wear contacts or glasses?\"      N/A  8. CAUSE: \"What do you think is causing this " "visual problem?\"      Thinking maybe having more problems with retina  9. OTHER SYMPTOMS: \"Do you have any other symptoms?\" (e.g., confusion, headache, arm or leg weakness, speech problems)      Headache over left forehead area, began when flashes of light started.  Is also experiencing \"speckles\" or floaters in left eye.  10. PREGNANCY: \"Is there any chance you are pregnant?\" \"When was your last menstrual period?\"        N/A    Protocols used: VISION LOSS OR CHANGE-A-OH      "

## 2021-11-24 ENCOUNTER — OFFICE VISIT (OUTPATIENT)
Dept: OPHTHALMOLOGY | Facility: CLINIC | Age: 26
End: 2021-11-24
Attending: OPHTHALMOLOGY
Payer: COMMERCIAL

## 2021-11-24 DIAGNOSIS — H33.312 RETINAL TEAR OF LEFT EYE: Primary | ICD-10-CM

## 2021-11-24 PROCEDURE — G0463 HOSPITAL OUTPT CLINIC VISIT: HCPCS

## 2021-11-24 PROCEDURE — 92012 INTRM OPH EXAM EST PATIENT: CPT | Performed by: OPHTHALMOLOGY

## 2021-11-24 ASSESSMENT — VISUAL ACUITY
CORRECTION_TYPE: GLASSES
OD_SC+: -2
OS_SC+: -2
OS_SC: 20/15
METHOD: SNELLEN - LINEAR
OD_SC: 20/15

## 2021-11-24 ASSESSMENT — SLIT LAMP EXAM - LIDS
COMMENTS: NORMAL
COMMENTS: NORMAL

## 2021-11-24 ASSESSMENT — EXTERNAL EXAM - RIGHT EYE: OD_EXAM: NORMAL

## 2021-11-24 ASSESSMENT — TONOMETRY
OS_IOP_MMHG: 20
IOP_METHOD: ICARE
OD_IOP_MMHG: 20

## 2021-11-24 ASSESSMENT — CONF VISUAL FIELD
OD_NORMAL: 1
METHOD: COUNTING FINGERS
OS_NORMAL: 1

## 2021-11-24 ASSESSMENT — EXTERNAL EXAM - LEFT EYE: OS_EXAM: NORMAL

## 2021-11-24 ASSESSMENT — CUP TO DISC RATIO
OD_RATIO: 0.2
OS_RATIO: 0.25

## 2021-11-24 NOTE — NURSING NOTE
Chief Complaints and History of Present Illnesses   Patient presents with     Consult For     Chief Complaint(s) and History of Present Illness(es)     Consult For     Laterality: left eye    Onset: sudden    Course: stable    Associated symptoms: headache, flashes, floaters and photophobia.  Negative for eye pain, dryness and tearing    Treatments tried: artificial tears    Pain scale: 0/10              Comments     Pt here for F/U from Dr Oleary for new flashes starting 11/15/21, LE>RE.    Also here for a F/U for a laser procedure done LE 10/28/21.  Still getting flashes about every 2 days LE.    New floaters starting on 11/15/21, not nearly as common as the flashes.  Light sensitivity for years.    AT's BE PRN, usually in the am.    CHEN Barlow November 24, 2021 3:26 PM
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
## 2021-12-02 ENCOUNTER — OFFICE VISIT (OUTPATIENT)
Dept: OBGYN | Facility: CLINIC | Age: 26
End: 2021-12-02
Attending: ADVANCED PRACTICE MIDWIFE
Payer: COMMERCIAL

## 2021-12-02 VITALS
HEIGHT: 65 IN | BODY MASS INDEX: 30.96 KG/M2 | SYSTOLIC BLOOD PRESSURE: 117 MMHG | DIASTOLIC BLOOD PRESSURE: 85 MMHG | HEART RATE: 91 BPM | WEIGHT: 185.8 LBS

## 2021-12-02 DIAGNOSIS — Z12.4 SCREENING FOR MALIGNANT NEOPLASM OF CERVIX: ICD-10-CM

## 2021-12-02 DIAGNOSIS — Z01.419 ENCOUNTER FOR GYNECOLOGICAL EXAMINATION WITHOUT ABNORMAL FINDING: ICD-10-CM

## 2021-12-02 DIAGNOSIS — Z11.3 SCREENING EXAMINATION FOR VENEREAL DISEASE: ICD-10-CM

## 2021-12-02 DIAGNOSIS — Z00.00 VISIT FOR PREVENTIVE HEALTH EXAMINATION: Primary | ICD-10-CM

## 2021-12-02 DIAGNOSIS — E55.9 VITAMIN D DEFICIENCY: ICD-10-CM

## 2021-12-02 PROBLEM — F50.20 BULIMIA NERVOSA: Status: ACTIVE | Noted: 2017-06-12

## 2021-12-02 PROBLEM — Z97.5 INTRAUTERINE DEVICE: Status: ACTIVE | Noted: 2017-06-12

## 2021-12-02 PROBLEM — F12.20 CANNABIS USE DISORDER, MODERATE, DEPENDENCE (H): Status: ACTIVE | Noted: 2017-08-18

## 2021-12-02 PROBLEM — R00.0 TACHYCARDIA: Status: ACTIVE | Noted: 2021-12-02

## 2021-12-02 PROBLEM — Z91.09 ENVIRONMENTAL ALLERGIES: Status: ACTIVE | Noted: 2017-06-12

## 2021-12-02 PROBLEM — Z97.5 INTRAUTERINE DEVICE: Status: RESOLVED | Noted: 2017-06-12 | Resolved: 2021-12-02

## 2021-12-02 PROBLEM — J45.20 MILD INTERMITTENT ASTHMA WITHOUT COMPLICATION: Status: ACTIVE | Noted: 2017-06-12

## 2021-12-02 PROBLEM — F31.81 BIPOLAR 2 DISORDER (H): Status: ACTIVE | Noted: 2017-06-12

## 2021-12-02 PROBLEM — F33.1 MODERATE EPISODE OF RECURRENT MAJOR DEPRESSIVE DISORDER (H): Status: ACTIVE | Noted: 2017-06-12

## 2021-12-02 PROBLEM — F43.10 PTSD (POST-TRAUMATIC STRESS DISORDER): Status: ACTIVE | Noted: 2017-06-12

## 2021-12-02 PROCEDURE — 99385 PREV VISIT NEW AGE 18-39: CPT | Mod: 24 | Performed by: ADVANCED PRACTICE MIDWIFE

## 2021-12-02 PROCEDURE — G0145 SCR C/V CYTO,THINLAYER,RESCR: HCPCS | Performed by: ADVANCED PRACTICE MIDWIFE

## 2021-12-02 PROCEDURE — 87591 N.GONORRHOEAE DNA AMP PROB: CPT | Performed by: ADVANCED PRACTICE MIDWIFE

## 2021-12-02 PROCEDURE — 87491 CHLMYD TRACH DNA AMP PROBE: CPT | Performed by: ADVANCED PRACTICE MIDWIFE

## 2021-12-02 PROCEDURE — G0463 HOSPITAL OUTPT CLINIC VISIT: HCPCS | Mod: 25

## 2021-12-02 ASSESSMENT — MIFFLIN-ST. JEOR: SCORE: 1588.66

## 2021-12-02 NOTE — PATIENT INSTRUCTIONS
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
## 2021-12-03 LAB
C TRACH DNA SPEC QL NAA+PROBE: NEGATIVE
N GONORRHOEA DNA SPEC QL NAA+PROBE: NEGATIVE

## 2021-12-06 LAB
BKR LAB AP GYN ADEQUACY: NORMAL
BKR LAB AP GYN INTERPRETATION: NORMAL
BKR LAB AP HPV REFLEX: NORMAL
BKR LAB AP LMP: NORMAL
BKR LAB AP PREVIOUS ABNL DX: NORMAL
BKR LAB AP PREVIOUS ABNORMAL: NORMAL
PATH REPORT.COMMENTS IMP SPEC: NORMAL
PATH REPORT.COMMENTS IMP SPEC: NORMAL
PATH REPORT.RELEVANT HX SPEC: NORMAL

## 2022-01-26 ENCOUNTER — OFFICE VISIT (OUTPATIENT)
Dept: OPHTHALMOLOGY | Facility: CLINIC | Age: 27
End: 2022-01-26
Attending: OPHTHALMOLOGY
Payer: COMMERCIAL

## 2022-01-26 DIAGNOSIS — Z98.890 HISTORY OF REPAIR OF RETINAL DEFECT BY LASER PHOTOCOAGULATION: Primary | ICD-10-CM

## 2022-01-26 PROCEDURE — 99024 POSTOP FOLLOW-UP VISIT: CPT | Performed by: OPHTHALMOLOGY

## 2022-01-26 PROCEDURE — G0463 HOSPITAL OUTPT CLINIC VISIT: HCPCS

## 2022-01-26 ASSESSMENT — SLIT LAMP EXAM - LIDS
COMMENTS: NORMAL
COMMENTS: NORMAL

## 2022-01-26 ASSESSMENT — VISUAL ACUITY
METHOD: SNELLEN - LINEAR
OS_SC+: -2
OS_SC: 20/15
OD_SC+: -2
OD_SC: 20/15

## 2022-01-26 ASSESSMENT — EXTERNAL EXAM - LEFT EYE: OS_EXAM: NORMAL

## 2022-01-26 ASSESSMENT — CONF VISUAL FIELD
OS_NORMAL: 1
METHOD: COUNTING FINGERS
OD_NORMAL: 1

## 2022-01-26 ASSESSMENT — TONOMETRY
IOP_METHOD: TONOPEN
OD_IOP_MMHG: 19
OS_IOP_MMHG: 15

## 2022-01-26 ASSESSMENT — CUP TO DISC RATIO
OS_RATIO: 0.25
OD_RATIO: 0.2

## 2022-01-26 ASSESSMENT — EXTERNAL EXAM - RIGHT EYE: OD_EXAM: NORMAL

## 2022-01-26 NOTE — NURSING NOTE
Chief Complaints and History of Present Illnesses   Patient presents with     Follow Up     Chief Complaint(s) and History of Present Illness(es)     Follow Up     Laterality: left eye    Course: stable    Associated symptoms: flashes.  Negative for eye pain, floaters and dryness              Comments     Pt states no change in VA since last visit  Pt states occ flashes, less since last visit  No floaters, eye pain or dryness    Shasta King COT 7:51 AM January 26, 2022

## 2022-01-26 NOTE — PROGRESS NOTES
CC: hx of pexy     HPI: Ame Ayala is a  26 year old year-old patient with history of flashing light sensation in both eyes, worse in the left eye. Now attributes the flashing lights more to migraines.       PMH: PTST; history of substance abuse  Retinal Imaging: Optos pic consistent with exam    Assessment & Plan:    # migrane aura   - w/ and w/out headaches   - will consider neurologist eval in future    # small peripheral tear, left eye   - status post laser retinopexy 10/28/21    - retina attached; doing well    - reviewed sx of retinal tear/detachment  Retina detachment precautions were discussed with the patient (presence or increased in flashes, floaters or a curtain in the visual field) and was asked to return if any of the those occur    # few inf peripheral vitreous white lesions both eyes   No vitreous cells  No inflammation   Observe for now  Could consider fluorescein angiography if inflammation develops    # history of substance abuse  LSD, alcohol, marihuana   -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  I personally obtained the chief complaint(s) and history of present illness.  I confirmed and edited as necessary the review of systems, past medical/surgical history, family history, social history, and examination findings as documented by others; and I examined the patient myself.  I personally reviewed the relevant tests, images, and reports as documented above.  I formulated and edited as necessary the assessment and plan and discussed the findings and management plan with the patient and family    Christa Hartmann MD   of Ophthalmology.   Retina Service   Department of Ophthalmology and Visual Neurosciences   [facility]  Phone: [phone]   Fax: [phone]

## 2022-02-02 ENCOUNTER — VIRTUAL VISIT (OUTPATIENT)
Dept: FAMILY MEDICINE | Facility: CLINIC | Age: 27
End: 2022-02-02
Payer: COMMERCIAL

## 2022-02-02 ENCOUNTER — LAB (OUTPATIENT)
Dept: URGENT CARE | Facility: URGENT CARE | Age: 27
End: 2022-02-02
Attending: INTERNAL MEDICINE
Payer: COMMERCIAL

## 2022-02-02 DIAGNOSIS — B34.9 VIRAL INFECTION, UNSPECIFIED: ICD-10-CM

## 2022-02-02 DIAGNOSIS — G44.329 CHRONIC POST-CONCUSSION HEADACHE: ICD-10-CM

## 2022-02-02 DIAGNOSIS — B34.9 VIRAL INFECTION, UNSPECIFIED: Primary | ICD-10-CM

## 2022-02-02 DIAGNOSIS — G43.109 MIGRAINE WITH AURA AND WITHOUT STATUS MIGRAINOSUS, NOT INTRACTABLE: ICD-10-CM

## 2022-02-02 DIAGNOSIS — R53.83 FATIGUE, UNSPECIFIED TYPE: ICD-10-CM

## 2022-02-02 PROBLEM — F12.21 CANNABIS DEPENDENCE, IN REMISSION (H): Status: ACTIVE | Noted: 2017-08-18

## 2022-02-02 LAB
DEPRECATED S PYO AG THROAT QL EIA: NEGATIVE
GROUP A STREP BY PCR: NOT DETECTED

## 2022-02-02 PROCEDURE — U0003 INFECTIOUS AGENT DETECTION BY NUCLEIC ACID (DNA OR RNA); SEVERE ACUTE RESPIRATORY SYNDROME CORONAVIRUS 2 (SARS-COV-2) (CORONAVIRUS DISEASE [COVID-19]), AMPLIFIED PROBE TECHNIQUE, MAKING USE OF HIGH THROUGHPUT TECHNOLOGIES AS DESCRIBED BY CMS-2020-01-R: HCPCS

## 2022-02-02 PROCEDURE — 99214 OFFICE O/P EST MOD 30 MIN: CPT | Mod: 95 | Performed by: INTERNAL MEDICINE

## 2022-02-02 PROCEDURE — U0005 INFEC AGEN DETEC AMPLI PROBE: HCPCS

## 2022-02-02 PROCEDURE — 87651 STREP A DNA AMP PROBE: CPT

## 2022-02-02 NOTE — LETTER
My Asthma Action Plan    Name: Ame Ayala   YOB: 1995  Date: 2/7/2022   My doctor: Tiffany Tompkins MD   My clinic: [facility]        My Rescue Medicine:   Albuterol inhaler (Proair/Ventolin/Proventil HFA)  2-4 puffs EVERY 4 HOURS as needed. Use a spacer if recommended by your provider.   My Asthma Severity:   Intermittent / Exercise Induced  Know your asthma triggers: N/A             GREEN ZONE   Good Control    I feel good    No cough or wheeze    Can work, sleep and play without asthma symptoms       Take your asthma control medicine every day.     1. If exercise triggers your asthma, take your rescue medication    15 minutes before exercise or sports, and    During exercise if you have asthma symptoms  2. Spacer to use with inhaler: If you have a spacer, make sure to use it with your inhaler             YELLOW ZONE Getting Worse  I have ANY of these:    I do not feel good    Cough or wheeze    Chest feels tight    Wake up at night   1. Keep taking your Green Zone medications  2. Start taking your rescue medicine:    every 20 minutes for up to 1 hour. Then every 4 hours for 24-48 hours.  3. If you stay in the Yellow Zone for more than 12-24 hours, contact your doctor.  4. If you do not return to the Green Zone in 12-24 hours or you get worse, start taking your oral steroid medicine if prescribed by your provider.           RED ZONE Medical Alert - Get Help  I have ANY of these:    I feel awful    Medicine is not helping    Breathing getting harder    Trouble walking or talking    Nose opens wide to breathe       1. Take your rescue medicine NOW  2. If your provider has prescribed an oral steroid medicine, start taking it NOW  3. Call your doctor NOW  4. If you are still in the Red Zone after 20 minutes and you have not reached your doctor:    Take your rescue medicine again and    Call 911 or go to the emergency room right away    See your regular doctor within 2  weeks of an Emergency Room or Urgent Care visit for follow-up treatment.          Annual Reminders:  Meet with Asthma Educator,  Flu Shot in the Fall, consider Pneumonia Vaccination for patients with asthma (aged 19 and older).    Pharmacy: [id number] - [place] [id number] AT [facility] [address]    Electronically signed by Tiffany Tompkins MD   Date: 02/07/22                    Asthma Triggers  How To Control Things That Make Your Asthma Worse    Triggers are things that make your asthma worse.  Look at the list below to help you find your triggers and   what you can do about them. You can help prevent asthma flare-ups by staying away from your triggers.      Trigger                                                          What you can do   Cigarette Smoke  Tobacco smoke can make asthma worse. Do not allow smoking in your home, car or around you.  Be sure no one smokes at a child s day care or school.  If you smoke, ask your health care provider for ways to help you quit.  Ask family members to quit too.  Ask your health care provider for a referral to Quit Plan to help you quit smoking, or call 8-548-987-PLAN.     Colds, Flu, Bronchitis  These are common triggers of asthma. Wash your hands often.  Don t touch your eyes, nose or mouth.  Get a flu shot every year.     Dust Mites  These are tiny bugs that live in cloth or carpet. They are too small to see. Wash sheets and blankets in hot water every week.   Encase pillows and mattress in dust mite proof covers.  Avoid having carpet if you can. If you have carpet, vacuum weekly.   Use a dust mask and HEPA vacuum.   Pollen and Outdoor Mold  Some people are allergic to trees, grass, or weed pollen, or molds. Try to keep your windows closed.  Limit time out doors when pollen count is high.   Ask you health care provider about taking medicine during allergy season.     Animal Dander  Some people are allergic to skin flakes, urine or  saliva from pets with fur or feathers. Keep pets with fur or feathers out of your home.    If you can t keep the pet outdoors, then keep the pet out of your bedroom.  Keep the bedroom door closed.  Keep pets off cloth furniture and away from stuffed toys.     Mice, Rats, and Cockroaches  Some people are allergic to the waste from these pests.   Cover food and garbage.  Clean up spills and food crumbs.  Store grease in the refrigerator.   Keep food out of the bedroom.   Indoor Mold  This can be a trigger if your home has high moisture. Fix leaking faucets, pipes, or other sources of water.   Clean moldy surfaces.  Dehumidify basement if it is damp and smelly.   Smoke, Strong Odors, and Sprays  These can reduce air quality. Stay away from strong odors and sprays, such as perfume, powder, hair spray, paints, smoke incense, paint, cleaning products, candles and new carpet.   Exercise or Sports  Some people with asthma have this trigger. Be active!  Ask your doctor about taking medicine before sports or exercise to prevent symptoms.    Warm up for 5-10 minutes before and after sports or exercise.     Other Triggers of Asthma  Cold air:  Cover your nose and mouth with a scarf.  Sometimes laughing or crying can be a trigger.  Some medicines and food can trigger asthma.
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
## 2022-02-02 NOTE — PATIENT INSTRUCTIONS
As discussed , your current symptoms appear to be related to Upper respiratory infection versus COVID. I have placed baseline lab work for making sure there is no other cause for your current symptoms before planning for Neurology referral for your Ongoing headaches due to post concussion and Migraine .     Please hydrate yourself well and take tylenol for symptoms.     ----------------       Page 1 of 2  (more on back)  For informational purposes only. Not to replace the advice of your health care provider. Clinically reviewed by Infection Prevention and the [facility] COVID-19 Clinical Team. Copyright   2020 [employer]. All rights reserved. [employer] [id number] - Rev 05/20.  For Patients Who Have Been Tested for COVID-19 (Coronavirus)  You have been tested for COVID-19 (coronavirus). Results are usually available in 1 to 3 days. Our testing sites do not have access to your test results.    If your test result is positive, you'll get a phone call letting you know. (A positive test means that you have the virus.)   If your test result is negative, you'll get a letter in the mail. (A negative test means you do not have the virus.)    If you have not gotten your results in 7 days:   If you're being tested before surgery: Call your surgeon's office. You also can call [phone] ([phone]) and ask to speak with our COVID-19 results team.    If you're being treated at an infusion center: Call your infusion center directly.   All others: Call [phone] ([phone]) and ask to speak with our COVID-19 results team.    What should I do if I have COVID-19 symptoms?  Please stay home and away from others (self-isolate) until:   You've had no fever--and no medicine that reduces fever--for 3 full days (72 hours), AND   Other symptoms have gotten better. For example, your cough or breathing has improved, AND   At least 10 days have passed since your symptoms first started.    During this  "time:   Stay in your own room (and use your own bathroom), if you can.   Stay away from others in your home. No hugging, kissing or shaking hands.   Don't let anyone visit.   Don't go to work, school or anywhere else.    Clean \"high touch\" surfaces often (doorknobs, counters, handles, etc.). Use a household cleaning spray or wipes.   Cover your mouth and nose with a mask, tissue or washcloth to avoid spreading germs.   Wash your hands and face often. Use soap and water.    When should I call 911?  If you have any of these emergency warning signs for COVID-19, call for medical help right away:   Trouble breathing   Pain or pressure in the chest all the time   Blue color to your lips or face    These are not all the symptoms you can have with COVID-19. Call your family clinic for any other symptoms that are severe or concerning to you.  When you call 911, tell the  that you have -- or think you might have--COVID-19.     "

## 2022-02-03 LAB — SARS-COV-2 RNA RESP QL NAA+PROBE: NEGATIVE

## 2022-02-04 ENCOUNTER — LAB (OUTPATIENT)
Dept: LAB | Facility: CLINIC | Age: 27
End: 2022-02-04
Payer: COMMERCIAL

## 2022-02-04 DIAGNOSIS — R53.83 FATIGUE, UNSPECIFIED TYPE: ICD-10-CM

## 2022-02-04 DIAGNOSIS — Z00.00 VISIT FOR PREVENTIVE HEALTH EXAMINATION: ICD-10-CM

## 2022-02-04 DIAGNOSIS — E55.9 VITAMIN D DEFICIENCY: ICD-10-CM

## 2022-02-04 LAB
ERYTHROCYTE [DISTWIDTH] IN BLOOD BY AUTOMATED COUNT: 12.7 % (ref 10–15)
HCT VFR BLD AUTO: 42.3 % (ref 35–47)
HGB BLD-MCNC: 14.1 G/DL (ref 11.7–15.7)
MCH RBC QN AUTO: 28.3 PG (ref 26.5–33)
MCHC RBC AUTO-ENTMCNC: 33.3 G/DL (ref 31.5–36.5)
MCV RBC AUTO: 85 FL (ref 78–100)
PLATELET # BLD AUTO: 386 10E3/UL (ref 150–450)
RBC # BLD AUTO: 4.99 10E6/UL (ref 3.8–5.2)
WBC # BLD AUTO: 6.5 10E3/UL (ref 4–11)

## 2022-02-04 PROCEDURE — 80053 COMPREHEN METABOLIC PANEL: CPT

## 2022-02-04 PROCEDURE — 36415 COLL VENOUS BLD VENIPUNCTURE: CPT

## 2022-02-04 PROCEDURE — 82306 VITAMIN D 25 HYDROXY: CPT

## 2022-02-04 PROCEDURE — 85027 COMPLETE CBC AUTOMATED: CPT

## 2022-02-05 LAB
ALBUMIN SERPL-MCNC: 4.1 G/DL (ref 3.4–5)
ALP SERPL-CCNC: 58 U/L (ref 40–150)
ALT SERPL W P-5'-P-CCNC: 25 U/L (ref 0–50)
ANION GAP SERPL CALCULATED.3IONS-SCNC: 3 MMOL/L (ref 3–14)
AST SERPL W P-5'-P-CCNC: 19 U/L (ref 0–45)
BILIRUB SERPL-MCNC: 1.2 MG/DL (ref 0.2–1.3)
BUN SERPL-MCNC: 11 MG/DL (ref 7–30)
CALCIUM SERPL-MCNC: 9.2 MG/DL (ref 8.5–10.1)
CHLORIDE BLD-SCNC: 107 MMOL/L (ref 94–109)
CO2 SERPL-SCNC: 28 MMOL/L (ref 20–32)
CREAT SERPL-MCNC: 0.94 MG/DL (ref 0.52–1.04)
GFR SERPL CREATININE-BSD FRML MDRD: 86 ML/MIN/1.73M2
GLUCOSE BLD-MCNC: 86 MG/DL (ref 70–99)
POTASSIUM BLD-SCNC: 4.4 MMOL/L (ref 3.4–5.3)
PROT SERPL-MCNC: 7.6 G/DL (ref 6.8–8.8)
SODIUM SERPL-SCNC: 138 MMOL/L (ref 133–144)

## 2022-02-06 ENCOUNTER — NURSE TRIAGE (OUTPATIENT)
Dept: NURSING | Facility: CLINIC | Age: 27
End: 2022-02-06
Payer: COMMERCIAL

## 2022-02-06 ENCOUNTER — HOSPITAL ENCOUNTER (EMERGENCY)
Facility: CLINIC | Age: 27
Discharge: HOME | End: 2022-02-06
Attending: EMERGENCY MEDICINE | Admitting: EMERGENCY MEDICINE
Payer: COMMERCIAL

## 2022-02-06 VITALS
OXYGEN SATURATION: 99 % | WEIGHT: 180 LBS | RESPIRATION RATE: 14 BRPM | DIASTOLIC BLOOD PRESSURE: 79 MMHG | TEMPERATURE: 98.6 F | BODY MASS INDEX: 29.99 KG/M2 | HEIGHT: 65 IN | SYSTOLIC BLOOD PRESSURE: 137 MMHG | HEART RATE: 90 BPM

## 2022-02-06 DIAGNOSIS — R51.9 NONINTRACTABLE HEADACHE, UNSPECIFIED CHRONICITY PATTERN, UNSPECIFIED HEADACHE TYPE: ICD-10-CM

## 2022-02-06 LAB
ANION GAP SERPL CALCULATED.3IONS-SCNC: 5 MMOL/L (ref 3–14)
BASOPHILS # BLD AUTO: 0 10E3/UL (ref 0–0.2)
BASOPHILS NFR BLD AUTO: 0 %
BUN SERPL-MCNC: 11 MG/DL (ref 7–30)
CALCIUM SERPL-MCNC: 8.7 MG/DL (ref 8.5–10.1)
CHLORIDE BLD-SCNC: 109 MMOL/L (ref 94–109)
CO2 SERPL-SCNC: 26 MMOL/L (ref 20–32)
CREAT SERPL-MCNC: 0.94 MG/DL (ref 0.52–1.04)
DEPRECATED CALCIDIOL+CALCIFEROL SERPL-MC: 24 UG/L (ref 20–75)
EOSINOPHIL # BLD AUTO: 0.1 10E3/UL (ref 0–0.7)
EOSINOPHIL NFR BLD AUTO: 2 %
ERYTHROCYTE [DISTWIDTH] IN BLOOD BY AUTOMATED COUNT: 12.2 % (ref 10–15)
FLUAV RNA SPEC QL NAA+PROBE: NEGATIVE
FLUBV RNA RESP QL NAA+PROBE: NEGATIVE
GFR SERPL CREATININE-BSD FRML MDRD: 86 ML/MIN/1.73M2
GLUCOSE BLD-MCNC: 97 MG/DL (ref 70–99)
HCT VFR BLD AUTO: 38.6 % (ref 35–47)
HGB BLD-MCNC: 12.8 G/DL (ref 11.7–15.7)
HOLD SPECIMEN: NORMAL
IMM GRANULOCYTES # BLD: 0 10E3/UL
IMM GRANULOCYTES NFR BLD: 0 %
LYMPHOCYTES # BLD AUTO: 2.5 10E3/UL (ref 0.8–5.3)
LYMPHOCYTES NFR BLD AUTO: 36 %
MCH RBC QN AUTO: 28.4 PG (ref 26.5–33)
MCHC RBC AUTO-ENTMCNC: 33.2 G/DL (ref 31.5–36.5)
MCV RBC AUTO: 86 FL (ref 78–100)
MONOCYTES # BLD AUTO: 0.7 10E3/UL (ref 0–1.3)
MONOCYTES NFR BLD AUTO: 10 %
NEUTROPHILS # BLD AUTO: 3.5 10E3/UL (ref 1.6–8.3)
NEUTROPHILS NFR BLD AUTO: 52 %
NRBC # BLD AUTO: 0 10E3/UL
NRBC BLD AUTO-RTO: 0 /100
PLATELET # BLD AUTO: 364 10E3/UL (ref 150–450)
POTASSIUM BLD-SCNC: 3.5 MMOL/L (ref 3.4–5.3)
RBC # BLD AUTO: 4.5 10E6/UL (ref 3.8–5.2)
SARS-COV-2 RNA RESP QL NAA+PROBE: NEGATIVE
SODIUM SERPL-SCNC: 140 MMOL/L (ref 133–144)
WBC # BLD AUTO: 6.9 10E3/UL (ref 4–11)

## 2022-02-06 PROCEDURE — 82435 ASSAY OF BLOOD CHLORIDE: CPT | Performed by: EMERGENCY MEDICINE

## 2022-02-06 PROCEDURE — 96375 TX/PRO/DX INJ NEW DRUG ADDON: CPT

## 2022-02-06 PROCEDURE — 85025 COMPLETE CBC W/AUTO DIFF WBC: CPT | Performed by: EMERGENCY MEDICINE

## 2022-02-06 PROCEDURE — 250N000011 HC RX IP 250 OP 636: Performed by: EMERGENCY MEDICINE

## 2022-02-06 PROCEDURE — C9803 HOPD COVID-19 SPEC COLLECT: HCPCS

## 2022-02-06 PROCEDURE — 87636 SARSCOV2 & INF A&B AMP PRB: CPT | Performed by: EMERGENCY MEDICINE

## 2022-02-06 PROCEDURE — 96374 THER/PROPH/DIAG INJ IV PUSH: CPT

## 2022-02-06 PROCEDURE — 36415 COLL VENOUS BLD VENIPUNCTURE: CPT | Performed by: EMERGENCY MEDICINE

## 2022-02-06 PROCEDURE — 99284 EMERGENCY DEPT VISIT MOD MDM: CPT | Mod: 25

## 2022-02-06 RX ORDER — KETOROLAC TROMETHAMINE 15 MG/ML
15 INJECTION, SOLUTION INTRAMUSCULAR; INTRAVENOUS ONCE
Status: COMPLETED | OUTPATIENT
Start: 2022-02-06 | End: 2022-02-06

## 2022-02-06 RX ORDER — METOCLOPRAMIDE HYDROCHLORIDE 5 MG/ML
10 INJECTION INTRAMUSCULAR; INTRAVENOUS ONCE
Status: COMPLETED | OUTPATIENT
Start: 2022-02-06 | End: 2022-02-06

## 2022-02-06 RX ADMIN — KETOROLAC TROMETHAMINE 15 MG: 15 INJECTION, SOLUTION INTRAMUSCULAR; INTRAVENOUS at 20:21

## 2022-02-06 RX ADMIN — METOCLOPRAMIDE 10 MG: 5 INJECTION, SOLUTION INTRAMUSCULAR; INTRAVENOUS at 20:21

## 2022-02-06 ASSESSMENT — MIFFLIN-ST. JEOR: SCORE: 1562.35

## 2022-02-07 ASSESSMENT — ASTHMA QUESTIONNAIRES: ACT_TOTALSCORE: 25

## 2022-02-07 NOTE — TELEPHONE ENCOUNTER
Triage Call: Last 2-2.5 weeks, meet with a provider and wanted to know if it possibly is meningitis. Fever: 99.7-100.0 for about 2 weeks. Constant headache, 4/10 but gets up to an 8/10m, for the last 2 weeks. Feels like she has a stiffneck, she is able to touch her chin to her chest. Has been using ibuprofen once in a while. Lightheaded once in a while. States she is having memory issues and some confusion- loosing train of though, forgetting things after seeing them or talking about them and having a hard time focusing.     According to the protocol, patient should call 911. Care advice given. Patient verbalizes understanding but stated she will have someone bring her into the ED.     COVID 19 Nurse Triage Plan/Patient Instructions    Please be aware that novel coronavirus (COVID-19) may be circulating in the community. If you develop symptoms such as fever, cough, or SOB or if you have concerns about the presence of another infection including coronavirus (COVID-19), please contact your health care provider or visit [email or web address].     Disposition/Instructions    Call to EMS/911 recommended. Follow protocol based instructions.     Bring Your Own Device:  Please also bring your smart device(s) (smart phones, tablets, laptops) and their charging cables for your personal use and to communicate with your care team during your visit.    Thank you for taking steps to prevent the spread of this virus.  o Limit your contact with others.  o Wear a simple mask to cover your cough.  o Wash your hands well and often.    Resources    [facility]: About COVID-19: [email or web address]    CDC: What to Do If You're Sick: www.cdc.gov/coronavirus/2019-ncov/about/steps-when-sick.html    CDC: Ending Home Isolation: www.cdc.gov/coronavirus/2019-ncov/hcp/disposition-in-home-patients.html     CDC: Caring for Someone: www.cdc.gov/coronavirus/2019-ncov/if-you-are-sick/care-for-someone.html     [facility]: Interim  Guidance for Hospital Discharge to Home: www.health.[facility].[place]/diseases/coronavirus/hcp/hospdischarge.pdf    [facility] clinical trials (COVID-19 research studies): [email or web address].[email or web address]/umn-[email or web address]-trials     Below are the COVID-19 hotlines at the [place] Department of Health ([facility]). Interpreters are available.   o For health questions: Call 824-447-9267 or 1-500.152.9069 (7 a.m. to 7 p.m.)  o For questions about schools and childcare: Call 544-593-2552 or 1-486.856.7152 (7 a.m. to 7 p.m.)     Michelle Zaragoza RN Nursing Advisor 2/6/2022 6:24 PM     Reason for Disposition    Difficult to awaken or acting confused (e.g., disoriented, slurred speech)    Protocols used: HEADACHE-A-AH

## 2022-02-07 NOTE — ED PROVIDER NOTES
"  History   Chief Complaint:  Fever and Headache       HPI   Ame Ayala is a 26 year old female who presents at the recommendation of the nurse triage line for evaluation of headache and fever.  Patient reports the highest her temperature has been is under 100 and has been going up and down.  She also complained of neck stiffness, however, after further conversation she reports that when she has a headache it makes her want to move her neck around and that stretching makes it feel better.  She has vague subjective confusion which she describes as losing her train of thought a little bit more often.  She is already scheduled to see neurology for these headaches and has further work-up scheduled.    Review of Systems   10 point review of systems performed and is negative except as above and in HPI.     Allergies:  Quetiapine    Medications:  Albuterol   Buspar   Lamictal   Propranolol    Past Medical History:     Anxiety   Depression   Eating disorder  PTSD   Substance abuse   Acute bilateral low back pain   Bipolar 2 disorder   Bulimia nervosa   Cannabis dependence   Asthma   Depressive disorder   Migraine with aura  Chronic post-concussion headache     Past Surgical History:    Retinal laser procedure     Family History:    Multiple sclerosis   Osteoarthritis   hypertension   Diabetes     Social History:  Has a female sexual partner and no other partners.  She denies possibility of pregnancy.    Physical Exam     Patient Vitals for the past 24 hrs:   BP Temp Temp src Pulse Resp SpO2 Height Weight   02/06/22 1948 137/79 98.6  F (37  C) Oral 90 14 99 % 1.651 m (5' 5\") 81.6 kg (180 lb)       Physical Exam  General: Resting on the gurney, appears somewhat uncomfortable  Head:  The scalp, face, and head appear normal  Neck:  No nuchal rigidity  Mouth/Throat: Mucus membranes are moist  CV:  Regular rate    Normal S1 and S2  No pathological murmur   Resp:  Breath sounds clear and equal bilaterally    Non-labored, no " retractions or accessory muscle use    No coarseness    No wheezing   GI:  Abdomen is soft, no rigidity    No tenderness to palpation  MS:  Normal motor assessment of all extremities.    Good capillary refill noted.  Skin:  No rash or lesions noted.  Neuro: Sensation and strength intact x4. Speech is normal and fluent. No apparent deficit.  Psych: Awake. Alert.  Normal affect.      Appropriate interactions.      Emergency Department Course     Laboratory:  Labs Ordered and Resulted from Time of ED Arrival to Time of ED Departure   INFLUENZA A/B & SARS-COV2 PCR MULTIPLEX - Normal       Result Value    Influenza A PCR Negative      Influenza B PCR Negative      SARS CoV2 PCR Negative     BASIC METABOLIC PANEL - Normal    Sodium 140      Potassium 3.5      Chloride 109      Carbon Dioxide (CO2) 26      Anion Gap 5      Urea Nitrogen 11      Creatinine 0.94      Calcium 8.7      Glucose 97      GFR Estimate 86     CBC WITH PLATELETS AND DIFFERENTIAL    WBC Count 6.9      RBC Count 4.50      Hemoglobin 12.8      Hematocrit 38.6      MCV 86      MCH 28.4      MCHC 33.2      RDW 12.2      Platelet Count 364      % Neutrophils 52      % Lymphocytes 36      % Monocytes 10      % Eosinophils 2      % Basophils 0      % Immature Granulocytes 0      NRBCs per 100 WBC 0      Absolute Neutrophils 3.5      Absolute Lymphocytes 2.5      Absolute Monocytes 0.7      Absolute Eosinophils 0.1      Absolute Basophils 0.0      Absolute Immature Granulocytes 0.0      Absolute NRBCs 0.0          Emergency Department Course:  Reviewed:  I reviewed nursing notes, vitals, past medical history and Care Everywhere    Assessments:   I obtained history and examined the patient as noted above.    I rechecked the patient and explained findings.     Interventions:  2021 Toradol 15 mg IV   Reglan 10 mg IV     Disposition:  The patient was discharged to home.     Impression & Plan     Medical Decision Making:  Ame Ayala is a 26 year old female  who presents with a headache. She has a history of headaches, though not usually this long lasting.  I considered a broad differential diagnosis for this patient including tension, migraine, analgesic rebound, occipital neuralgia, etc.  I also considered other less common but serious causes considered included meningitis, encephalitis, subarachnoid bleed, temporal arteritis, stroke, tumor, etc.  She has no signs of serious headache etiologies at this point.  No advanced imaging is indicated, nor is CT/lumbar puncture for SAH.  Her questions were answered and she feels improved after above interventions in ED.  Supportive outpatient management is therefore indicated.  Headache precautions given for home.      Evaluation in the emergency department has been negative. The patient's neurological examination has been normal. There has been no true fever (tmax <100) or neck stiffness (patient reports neck symptoms feel better with movement and stretching), and her headache has been present for several weeks, so I doubt meningitis.  The headache was gradual in onset and like previous headaches so I doubt SAH.  There is no associated numbness, paresthesia or confusion and I doubt stroke or CNS tumor. I do not feel that advanced imaging is indicated at this time. The patient was treated symptomatically and pain has improved with medication interventions.  The patient should follow-up with the primary physician in the outpatient setting. If the headache continues or the frequency increases, consultation with neurology will be indicated.  Return if increasing pain, fever, vomiting or weakness.  Anticipatory guidance given prior to discharge.      Diagnosis:    ICD-10-CM    1. Nonintractable headache, unspecified chronicity pattern, unspecified headache type  R51.9          Scribe Disclosure:  Evelyn RAO, am serving as a scribe at 9:05 PM on 2/6/2022 to document services personally performed by Jael Scott MD based on  my observations and the provider's statements to me.         Jael Scott MD  02/06/22 [id number]

## 2022-02-14 ENCOUNTER — TELEPHONE (OUTPATIENT)
Dept: INTERNAL MEDICINE | Facility: CLINIC | Age: 27
End: 2022-02-14
Payer: COMMERCIAL

## 2022-02-14 DIAGNOSIS — G43.109 MIGRAINE WITH AURA AND WITHOUT STATUS MIGRAINOSUS, NOT INTRACTABLE: ICD-10-CM

## 2022-02-14 DIAGNOSIS — G44.329 CHRONIC POST-CONCUSSION HEADACHE: Primary | ICD-10-CM

## 2022-02-14 NOTE — TELEPHONE ENCOUNTER
Dr. Perkins- Patient was into see you a couple weeks ago and you were going to refer her to neurology. There is no referral placed in her chart. Could you please refer her to [facility]. [place] is book out quite far. Thanks    Jocelyn Arrington  Referral Coordinator

## 2022-02-15 NOTE — TELEPHONE ENCOUNTER
Will contact patient to let her know referral completed to neurology.     Jocelyn HUYNH-referral rep

## 2022-05-03 ENCOUNTER — E-VISIT (OUTPATIENT)
Dept: URGENT CARE | Facility: CLINIC | Age: 27
End: 2022-05-03
Payer: COMMERCIAL

## 2022-05-03 DIAGNOSIS — R50.9 FEVER, UNSPECIFIED FEVER CAUSE: Primary | ICD-10-CM

## 2022-05-03 PROCEDURE — 99207 PR NO CHARGE LOS: CPT | Performed by: PHYSICIAN ASSISTANT

## 2022-05-04 NOTE — PATIENT INSTRUCTIONS
Dear Ame Ayala,    We are sorry you are not feeling well. Based on the responses you provided, it is recommended that you be seen in-person in urgent care so we can better evaluate your symptoms. Please click here to find the nearest urgent care location to you.   You will not be charged for this Visit. Thank you for trusting us with your care.    Pineda Paul PA-C

## 2022-05-05 ENCOUNTER — OFFICE VISIT (OUTPATIENT)
Dept: URGENT CARE | Facility: URGENT CARE | Age: 27
End: 2022-05-05
Payer: COMMERCIAL

## 2022-05-05 VITALS
HEART RATE: 98 BPM | OXYGEN SATURATION: 98 % | SYSTOLIC BLOOD PRESSURE: 118 MMHG | TEMPERATURE: 100.1 F | DIASTOLIC BLOOD PRESSURE: 72 MMHG

## 2022-05-05 DIAGNOSIS — R50.9 FEVER, UNSPECIFIED: Primary | ICD-10-CM

## 2022-05-05 DIAGNOSIS — B34.9 VIRAL SYNDROME: ICD-10-CM

## 2022-05-05 LAB
DEPRECATED S PYO AG THROAT QL EIA: NEGATIVE
FLUAV AG SPEC QL IA: NEGATIVE
FLUBV AG SPEC QL IA: NEGATIVE

## 2022-05-05 PROCEDURE — 87804 INFLUENZA ASSAY W/OPTIC: CPT

## 2022-05-05 PROCEDURE — 87651 STREP A DNA AMP PROBE: CPT

## 2022-05-05 PROCEDURE — 99213 OFFICE O/P EST LOW 20 MIN: CPT

## 2022-05-05 NOTE — LETTER
[facility] [place]  [address] [place] [address] [address]  [facility] [address]  Phone: [phone]  Fax: [phone]    May 5, 2022        Ame Ayala  [address] [place] [address]          To whom it may concern:    RE: Ame ABRAM Ayala    Patient was seen and treated today at our clinic and missed work.    Please contact me for questions or concerns.      Sincerely,        [facility] Urgent Care Provider

## 2022-05-06 LAB — GROUP A STREP BY PCR: NOT DETECTED
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
## 2022-09-14 ENCOUNTER — OFFICE VISIT (OUTPATIENT)
Dept: OBGYN | Facility: CLINIC | Age: 27
End: 2022-09-14
Attending: ADVANCED PRACTICE MIDWIFE
Payer: COMMERCIAL

## 2022-09-14 VITALS
BODY MASS INDEX: 28.82 KG/M2 | WEIGHT: 173 LBS | DIASTOLIC BLOOD PRESSURE: 83 MMHG | HEART RATE: 84 BPM | HEIGHT: 65 IN | SYSTOLIC BLOOD PRESSURE: 121 MMHG

## 2022-09-14 DIAGNOSIS — Z00.00 VISIT FOR PREVENTIVE HEALTH EXAMINATION: Primary | ICD-10-CM

## 2022-09-14 PROCEDURE — 99395 PREV VISIT EST AGE 18-39: CPT | Performed by: ADVANCED PRACTICE MIDWIFE

## 2022-09-14 PROCEDURE — G0463 HOSPITAL OUTPT CLINIC VISIT: HCPCS

## 2022-09-14 PROCEDURE — 90686 IIV4 VACC NO PRSV 0.5 ML IM: CPT

## 2022-09-14 PROCEDURE — 250N000011 HC RX IP 250 OP 636

## 2022-09-14 PROCEDURE — G0008 ADMIN INFLUENZA VIRUS VAC: HCPCS

## 2022-09-14 ASSESSMENT — ANXIETY QUESTIONNAIRES
5. BEING SO RESTLESS THAT IT IS HARD TO SIT STILL: SEVERAL DAYS
3. WORRYING TOO MUCH ABOUT DIFFERENT THINGS: MORE THAN HALF THE DAYS
2. NOT BEING ABLE TO STOP OR CONTROL WORRYING: SEVERAL DAYS
GAD7 TOTAL SCORE: 10
6. BECOMING EASILY ANNOYED OR IRRITABLE: MORE THAN HALF THE DAYS
GAD7 TOTAL SCORE: 10
1. FEELING NERVOUS, ANXIOUS, OR ON EDGE: NEARLY EVERY DAY
7. FEELING AFRAID AS IF SOMETHING AWFUL MIGHT HAPPEN: SEVERAL DAYS
IF YOU CHECKED OFF ANY PROBLEMS ON THIS QUESTIONNAIRE, HOW DIFFICULT HAVE THESE PROBLEMS MADE IT FOR YOU TO DO YOUR WORK, TAKE CARE OF THINGS AT HOME, OR GET ALONG WITH OTHER PEOPLE: SOMEWHAT DIFFICULT

## 2022-09-14 ASSESSMENT — PATIENT HEALTH QUESTIONNAIRE - PHQ9
SUM OF ALL RESPONSES TO PHQ QUESTIONS 1-9: 8
5. POOR APPETITE OR OVEREATING: NOT AT ALL

## 2022-09-14 NOTE — NURSING NOTE
Chief Complaint   Patient presents with     Physical     Annual, right breast pain underneath breast in the outer area
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
## 2022-09-14 NOTE — PATIENT INSTRUCTIONS

## 2022-09-26 PROBLEM — R53.83 FATIGUE, UNSPECIFIED TYPE: Status: RESOLVED | Noted: 2022-02-02 | Resolved: 2022-09-26

## 2022-09-26 PROBLEM — Z86.59 HISTORY OF BIPOLAR DISORDER: Status: ACTIVE | Noted: 2017-06-12

## 2022-09-26 PROBLEM — G43.909 MIGRAINE WITHOUT STATUS MIGRAINOSUS, NOT INTRACTABLE, UNSPECIFIED MIGRAINE TYPE: Status: ACTIVE | Noted: 2022-02-02

## 2022-09-26 PROBLEM — Z91.09 ENVIRONMENTAL ALLERGIES: Status: RESOLVED | Noted: 2017-06-12 | Resolved: 2022-09-26

## 2022-10-03 ENCOUNTER — OFFICE VISIT (OUTPATIENT)
Dept: FAMILY MEDICINE | Facility: CLINIC | Age: 27
End: 2022-10-03

## 2022-10-03 ENCOUNTER — LAB (OUTPATIENT)
Dept: LAB | Facility: CLINIC | Age: 27
End: 2022-10-03
Payer: COMMERCIAL

## 2022-10-03 VITALS
DIASTOLIC BLOOD PRESSURE: 70 MMHG | RESPIRATION RATE: 12 BRPM | OXYGEN SATURATION: 98 % | SYSTOLIC BLOOD PRESSURE: 110 MMHG | HEART RATE: 95 BPM | TEMPERATURE: 98.6 F

## 2022-10-03 DIAGNOSIS — B97.89 SORE THROAT (VIRAL): Primary | ICD-10-CM

## 2022-10-03 DIAGNOSIS — Z11.4 SCREENING FOR HIV (HUMAN IMMUNODEFICIENCY VIRUS): ICD-10-CM

## 2022-10-03 DIAGNOSIS — B97.89 SORE THROAT (VIRAL): ICD-10-CM

## 2022-10-03 DIAGNOSIS — J02.8 SORE THROAT (VIRAL): Primary | ICD-10-CM

## 2022-10-03 DIAGNOSIS — Z11.59 NEED FOR HEPATITIS C SCREENING TEST: ICD-10-CM

## 2022-10-03 DIAGNOSIS — J02.8 SORE THROAT (VIRAL): ICD-10-CM

## 2022-10-03 LAB — MONOCYTES NFR BLD AUTO: NEGATIVE %

## 2022-10-03 PROCEDURE — 86308 HETEROPHILE ANTIBODY SCREEN: CPT

## 2022-10-03 PROCEDURE — 86803 HEPATITIS C AB TEST: CPT

## 2022-10-03 PROCEDURE — 36415 COLL VENOUS BLD VENIPUNCTURE: CPT

## 2022-10-03 PROCEDURE — 87389 HIV-1 AG W/HIV-1&-2 AB AG IA: CPT

## 2022-10-03 PROCEDURE — 99213 OFFICE O/P EST LOW 20 MIN: CPT | Performed by: NURSE PRACTITIONER

## 2022-10-03 ASSESSMENT — ASTHMA QUESTIONNAIRES
QUESTION_5 LAST FOUR WEEKS HOW WOULD YOU RATE YOUR ASTHMA CONTROL: WELL CONTROLLED
QUESTION_2 LAST FOUR WEEKS HOW OFTEN HAVE YOU HAD SHORTNESS OF BREATH: ONCE OR TWICE A WEEK
ACT_TOTALSCORE: 22
QUESTION_3 LAST FOUR WEEKS HOW OFTEN DID YOUR ASTHMA SYMPTOMS (WHEEZING, COUGHING, SHORTNESS OF BREATH, CHEST TIGHTNESS OR PAIN) WAKE YOU UP AT NIGHT OR EARLIER THAN USUAL IN THE MORNING: NOT AT ALL
QUESTION_1 LAST FOUR WEEKS HOW MUCH OF THE TIME DID YOUR ASTHMA KEEP YOU FROM GETTING AS MUCH DONE AT WORK, SCHOOL OR AT HOME: NONE OF THE TIME
ACT_TOTALSCORE: 22
QUESTION_4 LAST FOUR WEEKS HOW OFTEN HAVE YOU USED YOUR RESCUE INHALER OR NEBULIZER MEDICATION (SUCH AS ALBUTEROL): ONCE A WEEK OR LESS

## 2022-10-03 NOTE — PROGRESS NOTES
"  Assessment & Plan   Problem List Items Addressed This Visit    None     Visit Diagnoses     Sore throat (viral)    -  Primary    Relevant Orders    REVIEW OF HEALTH MAINTENANCE PROTOCOL ORDERS (Completed)    Mononucleosis screen (Completed)    Screening for HIV (human immunodeficiency virus)        Need for hepatitis C screening test           956}     Nicotine/Tobacco Cessation:  She reports that she has been smoking vaping device. She has never used smokeless tobacco.  Nicotine/Tobacco Cessation Plan:   Information offered: Patient not interested at this time      BMI:   Estimated body mass index is 28.79 kg/m  as calculated from the following:    Height as of 9/14/22: 1.651 m (5' 5\").    Weight as of 9/14/22: 78.5 kg (173 lb).       See Patient Instructions    No follow-ups on file.    KATIE Amor CNP [facility] is a 27 year old presenting for the following health issues:  Mononucleosis      History of Present Illness       Reason for visit:  Mononucleosis symptoms  Symptom onset:  3-7 days ago  Symptoms include:  Fatigue, sore/tender throat, cough, congestion, loss of appetite, ear pain, fever, chills, headache.  Symptom intensity:  Moderate  Symptom progression:  Staying the same  Had these symptoms before:  No  What makes it worse:  Dehydration, not getting a lot of sleep, and exercise.  What makes it better:  Hydration and lots of sleep.    She eats 4 or more servings of fruits and vegetables daily.She consumes 1 sweetened beverage(s) daily.She exercises with enough effort to increase her heart rate 20 to 29 minutes per day.  She exercises with enough effort to increase her heart rate 4 days per week. She is missing 1 dose(s) of medications per week.  She is not taking prescribed medications regularly due to remembering to take.     Mild cough; bringing up phlegm. Has noticed fatigue is improving.       Review of Systems   Constitutional, HEENT, " cardiovascular, pulmonary, gi and gu systems are negative, except as otherwise noted.      Objective    /70   Pulse 95   Temp 98.6  F (37  C)   Resp 12   LMP 09/02/2022 (Approximate)   SpO2 98%   There is no height or weight on file to calculate BMI.  Physical Exam   GENERAL: healthy, alert and no distress  EYES: Eyes grossly normal to inspection, PERRL and conjunctivae and sclerae normal  HENT: ear canals and TM's normal, nose and mouth without ulcers or lesions  NECK: cervical adenopathy + and thyroid normal to palpation  RESP: lungs clear to auscultation - no rales, rhonchi or wheezes  CV: regular rate and rhythm, normal S1 S2, no S3 or S4, no murmur, click or rub, no peripheral edema and peripheral pulses strong  MS: no gross musculoskeletal defects noted, no edema

## 2022-10-03 NOTE — LETTER
October 3, 2022      Ame Ayala  [address] [facility] [address]        To Whom It May Concern:    Ame Ayala  was seen on 10/3/2022.  She is currently ill and will likely need to be absent on 10/4/2022, and possibly 10/6/2022 depending on her symptoms.      Sincerely,        KATIE Amor CNP

## 2022-10-04 PROBLEM — Z91.52 HISTORY OF NON-SUICIDAL SELF-HARM: Status: ACTIVE | Noted: 2017-06-12

## 2022-10-04 LAB
HCV AB SERPL QL IA: NONREACTIVE
HIV 1+2 AB+HIV1 P24 AG SERPL QL IA: NONREACTIVE

## 2022-10-20 ENCOUNTER — ANCILLARY PROCEDURE (OUTPATIENT)
Dept: MAMMOGRAPHY | Facility: CLINIC | Age: 27
End: 2022-10-20
Attending: ADVANCED PRACTICE MIDWIFE
Payer: COMMERCIAL

## 2022-10-20 DIAGNOSIS — Z00.00 VISIT FOR PREVENTIVE HEALTH EXAMINATION: ICD-10-CM

## 2022-10-20 PROCEDURE — 76642 ULTRASOUND BREAST LIMITED: CPT | Mod: RT | Performed by: RADIOLOGY

## 2022-12-16 ENCOUNTER — TELEPHONE (OUTPATIENT)
Dept: OBGYN | Facility: CLINIC | Age: 27
End: 2022-12-16

## 2022-12-16 NOTE — TELEPHONE ENCOUNTER
[facility] Call Center    Phone Message    May a detailed message be left on voicemail: yes     Reason for Call: Other: .  Pt calling stating shes running late to 8:30am appt with Hetal.     Action Taken: Message routed to:  Other: [facility]    Travel Screening: Not Applicable

## 2023-02-06 ENCOUNTER — OFFICE VISIT (OUTPATIENT)
Dept: URGENT CARE | Facility: URGENT CARE | Age: 28
End: 2023-02-06
Payer: COMMERCIAL

## 2023-02-06 ENCOUNTER — NURSE TRIAGE (OUTPATIENT)
Dept: NURSING | Facility: CLINIC | Age: 28
End: 2023-02-06
Payer: COMMERCIAL

## 2023-02-06 VITALS
WEIGHT: 170 LBS | DIASTOLIC BLOOD PRESSURE: 74 MMHG | HEART RATE: 118 BPM | TEMPERATURE: 101.7 F | OXYGEN SATURATION: 97 % | BODY MASS INDEX: 28.32 KG/M2 | SYSTOLIC BLOOD PRESSURE: 110 MMHG | HEIGHT: 65 IN

## 2023-02-06 DIAGNOSIS — J45.20 MILD INTERMITTENT ASTHMA, UNSPECIFIED WHETHER COMPLICATED: ICD-10-CM

## 2023-02-06 DIAGNOSIS — U07.1 COVID-19: Primary | ICD-10-CM

## 2023-02-06 PROCEDURE — 99214 OFFICE O/P EST MOD 30 MIN: CPT | Mod: CS | Performed by: PHYSICIAN ASSISTANT

## 2023-02-06 RX ORDER — ONDANSETRON 8 MG/1
8 TABLET, ORALLY DISINTEGRATING ORAL EVERY 8 HOURS PRN
Qty: 30 TABLET | Refills: 0 | Status: SHIPPED | OUTPATIENT
Start: 2023-02-06 | End: 2023-04-13

## 2023-02-06 RX ORDER — FLUTICASONE PROPIONATE 110 UG/1
1 AEROSOL, METERED RESPIRATORY (INHALATION) 2 TIMES DAILY
Qty: 12 G | Refills: 0 | Status: SHIPPED | OUTPATIENT
Start: 2023-02-06 | End: 2023-05-24

## 2023-02-06 NOTE — PROGRESS NOTES
COVID-19  - ondansetron (ZOFRAN ODT) 8 MG ODT tab; Take 1 tablet (8 mg) by mouth every 8 hours as needed for nausea  - fluticasone (FLOVENT HFA) 110 MCG/ACT inhaler; Inhale 1 puff into the lungs 2 times daily  - nirmatrelvir and ritonavir (PAXLOVID) therapy pack; Take 3 tablets by mouth 2 times daily for 5 days    Mild intermittent asthma, unspecified whether complicated  - fluticasone (FLOVENT HFA) 110 MCG/ACT inhaler; Inhale 1 puff into the lungs 2 times daily    Age 12 months or more  Okay to use Zarbee's   Okay to use Rx Children Tylenol if prescribed (Dose based on weight)    Age 2-12:   Okay to use Children Motrin or Tylenol over the counter.    Adults:  Okay to take acetaminophen 500 mg- 2 tabs (Total of 1000 mg) every 8 hrs   Okay to take ibuprofen 200 mg- 3 tabs (Total of 600 mg) every 6 hours        Okay to use Neti pot for sinus lavage up to three times daily for congestion and sinus pressure if present. Daily hot shower can be beneficial for congestion and body aches. Okay to use bedroom vaporizer or humidifier if symptoms are worse at night. Nightly Vicks Vapor rub and 5-10 mg of Melatonin okay to use for sleep.     Over the counter cough medication and decongestants okay if not prescribed by me during this visit. For homeopathic alternatives to cough syrup and decongestant, feel free to try Elderberry extract.    Okay to use salt water gargles, warm tea (or warm water with lemon and honey), and lozenges for any throat discomfort. Chloraseptic spray is also highly encourages for throat pain/irritation.     Patient will need to get plenty of rest and drink at least 1.5-2 liters of fluids daily for adults and 1-1.5 liters for children. If vomiting and not tolerating liquids for more than 24 hrs, please go to your nearest emergency department for IV fluids and further treatment.     Patient is not contagious after 1 week from start of symptoms. If possible, wear mask for first 7 days. Wash hands  regularly and vigorously for 30 seconds often.       JEYSON Nunez [facility]    Subjective   27 year old who presents to clinic today for the following health issues:    Urgent Care and Covid Concern       HPI     Acute Illness  Acute illness concerns: Fever, body aches, chills, cough, headache, runny nose, nausea, vomiting. Covid test last night.  Onset/Duration: 2 days  Symptoms:  Fever: YES  Chills/Sweats: YES  Headache (location?): YES  Sinus Pressure: Mild  Conjunctivitis:  No  Ear Pain: no  Rhinorrhea: YES  Congestion: No  Sore Throat: No  Cough: YES  Wheeze: No but patient has been short of breath- Patient has asthma.    Decreased Appetite: YES  Nausea: YES  Vomiting: YES  Diarrhea: No  Dysuria/Freq.: No  Dysuria or Hematuria: No  Fatigue/Achiness: YES  Sick/Strep Exposure: None known   Therapies tried and outcome: Ibuprofen, Dayquil.     Review of Systems   Review of Systems   See HPI    Objective    Temp: (!) 101.7  F (38.7  C) Temp src: Temporal BP: 110/74 Pulse: 118     SpO2: 97 %       Physical Exam   Physical Exam  Constitutional:       General: She is not in acute distress.     Appearance: Normal appearance. She is normal weight. She is not ill-appearing, toxic-appearing or diaphoretic.   HENT:      Head: Normocephalic and atraumatic.      Right Ear: Tympanic membrane, ear canal and external ear normal. There is no impacted cerumen.      Left Ear: Tympanic membrane, ear canal and external ear normal. There is no impacted cerumen.      Nose: Congestion and rhinorrhea present.      Mouth/Throat:      Mouth: Mucous membranes are moist.      Pharynx: Oropharynx is clear. No oropharyngeal exudate or posterior oropharyngeal erythema.   Cardiovascular:      Rate and Rhythm: Normal rate and regular rhythm.      Pulses: Normal pulses.      Heart sounds: Normal heart sounds. No murmur heard.    No friction rub. No gallop.   Pulmonary:      Effort: Pulmonary effort is normal. No  respiratory distress.      Breath sounds: Normal breath sounds. No stridor. No wheezing, rhonchi or rales.   Chest:      Chest wall: No tenderness.   Lymphadenopathy:      Cervical: Cervical adenopathy present.   Neurological:      General: No focal deficit present.      Mental Status: She is alert and oriented to person, place, and time. Mental status is at baseline.   Psychiatric:         Mood and Affect: Mood normal.         Behavior: Behavior normal.         Thought Content: Thought content normal.         Judgment: Judgment normal.          No results found for this or any previous visit (from the past 24 hour(s)).

## 2023-02-06 NOTE — TELEPHONE ENCOUNTER
Patient calling. Started covid symptoms 3 days ago. Home test was positive yesterday. Had a fever of 102.2 this morning. Having problems eating, vomited today. Has not been able to take antipyretics, temperature 103.3.      COVID Positive/Requesting COVID treatment    Patient is positive for COVID and requesting treatment options.    Date of positive COVID test (PCR or at home)? 2/5/2023  Current COVID symptoms: fever or chills, cough, shortness of breath or difficulty breathing, fatigue, muscle or body aches, headache, congestion or runny nose and nausea or vomiting  Date COVID symptoms began: 2/4/2023    Message should be routed to clinic RN pool. Best phone number to use for call back: [phone] OK to leave a detailed message.    Care advice given for patient to wait for call from clinic due to fever over 103 degrees. Patient states that she will wait.    Kenyatta Zhao RN  [facility]  February 6, 2023, 3:39 PM    Reason for Disposition    Fever > 103 F (39.4 C)    Additional Information    Negative: SEVERE difficulty breathing (e.g., struggling for each breath, speaks in single words)    Negative: Difficult to awaken or acting confused (e.g., disoriented, slurred speech)    Negative: Bluish (or gray) lips or face now    Negative: Shock suspected (e.g., cold/pale/clammy skin, too weak to stand, low BP, rapid pulse)    Negative: Sounds like a life-threatening emergency to the triager    Negative: [1] Diagnosed or suspected COVID-19 AND [2] symptoms lasting 3 or more weeks    Negative: [1] COVID-19 exposure AND [2] no symptoms    Negative: COVID-19 vaccine reaction suspected (e.g., fever, headache, muscle aches) occurring 1 to 3 days after getting vaccine    Negative: COVID-19 vaccine, questions about    Negative: [1] Lives with someone known to have influenza (flu test positive) AND [2] flu-like symptoms (e.g., cough, runny nose, sore throat, SOB; with or without fever)    Negative: [1] Adult with  possible COVID-19 symptoms AND [2] triager concerned about severity of symptoms or other causes    Negative: COVID-19 and breastfeeding, questions about    Negative: SEVERE or constant chest pain or pressure  (Exception: Mild central chest pain, present only when coughing.)    Negative: MODERATE difficulty breathing (e.g., speaks in phrases, SOB even at rest, pulse 100-120)    Negative: Headache and stiff neck (can't touch chin to chest)    Negative: Oxygen level (e.g., pulse oximetry) 90 percent or lower    Negative: Chest pain or pressure  (Exception: MILD central chest pain, present only when coughing)    Negative: Patient sounds very sick or weak to the triager    Negative: MILD difficulty breathing (e.g., minimal/no SOB at rest, SOB with walking, pulse <100)    Protocols used: CORONAVIRUS (COVID-19) DIAGNOSED OR [id number]

## 2023-02-06 NOTE — TELEPHONE ENCOUNTER
Called patient, she is on the way to UC. Scheduled her with virtual visit with Dr. Souza that she can keep if follow up is needed, patient will cancel if need be.    Kapil Adhikari RN on 2/6/2023 at 4:52 PM

## 2023-04-13 ENCOUNTER — OFFICE VISIT (OUTPATIENT)
Dept: INTERNAL MEDICINE | Facility: CLINIC | Age: 28
End: 2023-04-13
Payer: COMMERCIAL

## 2023-04-13 VITALS
OXYGEN SATURATION: 96 % | HEIGHT: 66 IN | HEART RATE: 78 BPM | SYSTOLIC BLOOD PRESSURE: 100 MMHG | TEMPERATURE: 98.4 F | BODY MASS INDEX: 27.69 KG/M2 | DIASTOLIC BLOOD PRESSURE: 69 MMHG

## 2023-04-13 DIAGNOSIS — R00.0 TACHYCARDIA: ICD-10-CM

## 2023-04-13 DIAGNOSIS — R22.42 LEG MASS, LEFT: Primary | ICD-10-CM

## 2023-04-13 DIAGNOSIS — R47.89 WORD FINDING PROBLEM: ICD-10-CM

## 2023-04-13 DIAGNOSIS — R94.31 PROLONGED Q-T INTERVAL ON ECG: ICD-10-CM

## 2023-04-13 DIAGNOSIS — H91.93 DECREASED HEARING OF BOTH EARS: ICD-10-CM

## 2023-04-13 DIAGNOSIS — E63.9 NUTRITIONAL DEFICIENCY: ICD-10-CM

## 2023-04-13 PROCEDURE — 99215 OFFICE O/P EST HI 40 MIN: CPT | Mod: 25 | Performed by: NURSE PRACTITIONER

## 2023-04-13 PROCEDURE — 93000 ELECTROCARDIOGRAM COMPLETE: CPT | Performed by: NURSE PRACTITIONER

## 2023-04-13 ASSESSMENT — PATIENT HEALTH QUESTIONNAIRE - PHQ9
SUM OF ALL RESPONSES TO PHQ QUESTIONS 1-9: 8
5. POOR APPETITE OR OVEREATING: SEVERAL DAYS

## 2023-04-13 ASSESSMENT — ENCOUNTER SYMPTOMS
HYPERTENSION: 0
SLEEP DISTURBANCES DUE TO BREATHING: 0
LIGHT-HEADEDNESS: 0
NERVOUS/ANXIOUS: 1
DECREASED CONCENTRATION: 1
SYNCOPE: 0
HYPOTENSION: 0
ORTHOPNEA: 0
LEG PAIN: 0
EXERCISE INTOLERANCE: 0
DEPRESSION: 1
INSOMNIA: 0
PALPITATIONS: 1
PANIC: 1

## 2023-04-13 ASSESSMENT — ANXIETY QUESTIONNAIRES
GAD7 TOTAL SCORE: 8
IF YOU CHECKED OFF ANY PROBLEMS ON THIS QUESTIONNAIRE, HOW DIFFICULT HAVE THESE PROBLEMS MADE IT FOR YOU TO DO YOUR WORK, TAKE CARE OF THINGS AT HOME, OR GET ALONG WITH OTHER PEOPLE: SOMEWHAT DIFFICULT
3. WORRYING TOO MUCH ABOUT DIFFERENT THINGS: SEVERAL DAYS
5. BEING SO RESTLESS THAT IT IS HARD TO SIT STILL: MORE THAN HALF THE DAYS
GAD7 TOTAL SCORE: 8
6. BECOMING EASILY ANNOYED OR IRRITABLE: SEVERAL DAYS
1. FEELING NERVOUS, ANXIOUS, OR ON EDGE: SEVERAL DAYS
7. FEELING AFRAID AS IF SOMETHING AWFUL MIGHT HAPPEN: SEVERAL DAYS
2. NOT BEING ABLE TO STOP OR CONTROL WORRYING: SEVERAL DAYS

## 2023-04-13 NOTE — NURSING NOTE
"Ame Ayala is a 27 year old female patient that presents today in clinic for the following:    Chief Complaint   Patient presents with     Physical     Large lump on left thigh for past 6 months.     The patient's allergies and medications were reviewed as noted. A set of vitals were recorded as noted without incident: /69 (BP Location: Right arm, Patient Position: Sitting, Cuff Size: Adult Regular)   Pulse 78   Temp 98.4  F (36.9  C) (Oral)   Ht 1.669 m (5' 5.7\")   SpO2 96%   BMI 27.69 kg/m  . The patient does not have any other questions for the provider.    Radha Lima, EMT at 5:59 PM on 4/13/2023.  Primary care clinic: [phone]  "
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
## 2023-04-14 NOTE — PATIENT INSTRUCTIONS
To schedule a fitting for your Holter monitor, please call [phone].    To schedule an lab appointment at the [facility], please call [phone].

## 2023-04-16 LAB
ATRIAL RATE - MUSE: 67 BPM
DIASTOLIC BLOOD PRESSURE - MUSE: NORMAL MMHG
INTERPRETATION ECG - MUSE: NORMAL
P AXIS - MUSE: 36 DEGREES
PR INTERVAL - MUSE: 144 MS
QRS DURATION - MUSE: 92 MS
QT - MUSE: 420 MS
QTC - MUSE: 443 MS
R AXIS - MUSE: 17 DEGREES
SYSTOLIC BLOOD PRESSURE - MUSE: NORMAL MMHG
T AXIS - MUSE: 34 DEGREES
VENTRICULAR RATE- MUSE: 67 BPM

## 2023-04-21 ENCOUNTER — ANCILLARY PROCEDURE (OUTPATIENT)
Dept: CARDIOLOGY | Facility: CLINIC | Age: 28
End: 2023-04-21
Attending: NURSE PRACTITIONER
Payer: COMMERCIAL

## 2023-04-21 ENCOUNTER — ANCILLARY PROCEDURE (OUTPATIENT)
Dept: ULTRASOUND IMAGING | Facility: CLINIC | Age: 28
End: 2023-04-21
Attending: NURSE PRACTITIONER
Payer: COMMERCIAL

## 2023-04-21 DIAGNOSIS — R22.42 LEG MASS, LEFT: ICD-10-CM

## 2023-04-21 DIAGNOSIS — R00.0 TACHYCARDIA: ICD-10-CM

## 2023-04-21 LAB — RADIOLOGIST FLAGS: NORMAL

## 2023-04-21 PROCEDURE — 93244 EXT ECG>48HR<7D REV&INTERPJ: CPT | Performed by: INTERNAL MEDICINE

## 2023-04-21 PROCEDURE — 93242 EXT ECG>48HR<7D RECORDING: CPT

## 2023-04-21 PROCEDURE — 76882 US LMTD JT/FCL EVL NVASC XTR: CPT | Performed by: RADIOLOGY

## 2023-04-21 NOTE — PROGRESS NOTES
"Per Dr. Sarmiento, patient to have 7 day zio monitor placed.  Diagnosis: tachycardia  Monitor placed: {YES / NO:528703::\"Yes\"}  Patient Instructed: {YES / NO:303249::\"Yes\"}  Patient verbalized understanding: {YES / NO:185056::\"Yes\"}  Holter # [id number]  Patrick Arreola"

## 2023-04-24 ENCOUNTER — MYC MEDICAL ADVICE (OUTPATIENT)
Dept: INTERNAL MEDICINE | Facility: CLINIC | Age: 28
End: 2023-04-24
Payer: COMMERCIAL

## 2023-04-25 DIAGNOSIS — R22.42 MASS OF LEFT THIGH: Primary | ICD-10-CM

## 2023-04-28 ENCOUNTER — HOSPITAL ENCOUNTER (OUTPATIENT)
Dept: MRI IMAGING | Facility: CLINIC | Age: 28
Discharge: HOME | End: 2023-04-28
Attending: NURSE PRACTITIONER | Admitting: NURSE PRACTITIONER
Payer: COMMERCIAL

## 2023-04-28 DIAGNOSIS — R22.42 LEG MASS, LEFT: Primary | ICD-10-CM

## 2023-04-28 DIAGNOSIS — R22.42 MASS OF LEFT THIGH: ICD-10-CM

## 2023-04-28 LAB — RADIOLOGIST FLAGS: NORMAL

## 2023-04-28 PROCEDURE — 73720 MRI LWR EXTREMITY W/O&W/DYE: CPT | Mod: LT

## 2023-04-28 PROCEDURE — 255N000002 HC RX 255 OP 636: Performed by: NURSE PRACTITIONER

## 2023-04-28 PROCEDURE — A9585 GADOBUTROL INJECTION: HCPCS | Performed by: NURSE PRACTITIONER

## 2023-04-28 PROCEDURE — 73720 MRI LWR EXTREMITY W/O&W/DYE: CPT | Mod: 26 | Performed by: RADIOLOGY

## 2023-04-28 RX ORDER — GADOBUTROL 604.72 MG/ML
7.5 INJECTION INTRAVENOUS ONCE
Status: DISCONTINUED | OUTPATIENT
Start: 2023-04-28 | End: 2023-04-29 | Stop reason: HOSPADM

## 2023-04-28 RX ORDER — GADOBUTROL 604.72 MG/ML
7.5 INJECTION INTRAVENOUS ONCE
Status: DISCONTINUED | OUTPATIENT
Start: 2023-04-28 | End: 2023-04-28

## 2023-04-28 RX ORDER — GADOBUTROL 604.72 MG/ML
7.5 INJECTION INTRAVENOUS ONCE
Status: COMPLETED | OUTPATIENT
Start: 2023-04-28 | End: 2023-04-28

## 2023-04-28 RX ADMIN — GADOBUTROL 7.5 ML: 604.72 INJECTION INTRAVENOUS at 07:05

## 2023-05-01 NOTE — TELEPHONE ENCOUNTER
DIAGNOSIS: Leg mass, left,Malinda Sarmiento APRN CNP in [facility] INTERNAL [facility]INE   APPOINTMENT DATE: 5/5/23   NOTES STATUS DETAILS   OFFICE NOTE from referring provider Internal 4/28/23 referral   4/13/23 OV Malinda Sarmiento APRN CNP   MEDICATION LIST Internal    LABS     CBC/DIFF Internal 4/13/23 - Epic    MRI Internal Internal: PACS   MR Femur: 4/28/23   TUMOR     PATHOLOGY  Slides & report N/A

## 2023-05-05 ENCOUNTER — TELEPHONE (OUTPATIENT)
Dept: ORTHOPEDICS | Facility: CLINIC | Age: 28
End: 2023-05-05

## 2023-05-05 ENCOUNTER — OFFICE VISIT (OUTPATIENT)
Dept: ORTHOPEDICS | Facility: CLINIC | Age: 28
End: 2023-05-05
Attending: NURSE PRACTITIONER
Payer: COMMERCIAL

## 2023-05-05 ENCOUNTER — PRE VISIT (OUTPATIENT)
Dept: ORTHOPEDICS | Facility: CLINIC | Age: 28
End: 2023-05-05

## 2023-05-05 VITALS — HEIGHT: 66 IN | BODY MASS INDEX: 30.37 KG/M2 | WEIGHT: 189 LBS

## 2023-05-05 DIAGNOSIS — R22.42 LEG MASS, LEFT: ICD-10-CM

## 2023-05-05 PROCEDURE — 99204 OFFICE O/P NEW MOD 45 MIN: CPT | Performed by: ORTHOPAEDIC SURGERY

## 2023-05-05 ASSESSMENT — ENCOUNTER SYMPTOMS
MEMORY LOSS: 1
CHILLS: 0
PANIC: 1
JOINT SWELLING: 0
WEAKNESS: 1
TREMORS: 0
DEPRESSION: 1
HYPOTENSION: 0
SINUS CONGESTION: 1
DYSPNEA ON EXERTION: 0
NIGHT SWEATS: 0
BACK PAIN: 0
INSOMNIA: 0
DIARRHEA: 0
SPEECH CHANGE: 1
BOWEL INCONTINENCE: 0
SLEEP DISTURBANCES DUE TO BREATHING: 0
COUGH: 0
ABDOMINAL PAIN: 0
POLYPHAGIA: 0
HEMOPTYSIS: 0
EXERCISE INTOLERANCE: 0
TROUBLE SWALLOWING: 0
POLYDIPSIA: 0
ARTHRALGIAS: 0
WEIGHT LOSS: 0
HYPERTENSION: 0
PALPITATIONS: 1
DISTURBANCES IN COORDINATION: 0
BLOOD IN STOOL: 0
SORE THROAT: 0
CONSTIPATION: 0
PARALYSIS: 0
SMELL DISTURBANCE: 0
POSTURAL DYSPNEA: 0
TINGLING: 0
HEARTBURN: 0
SPUTUM PRODUCTION: 1
SINUS PAIN: 0
JAUNDICE: 0
NECK MASS: 0
VOMITING: 0
NECK PAIN: 0
HEADACHES: 1
WEIGHT GAIN: 0
LEG PAIN: 1
LOSS OF CONSCIOUSNESS: 0
TASTE DISTURBANCE: 0
BLOATING: 0
MUSCLE WEAKNESS: 1
SNORES LOUDLY: 0
HALLUCINATIONS: 0
NUMBNESS: 1
FEVER: 0
HOARSE VOICE: 0
RECTAL PAIN: 0
MUSCLE CRAMPS: 1
STIFFNESS: 0
DECREASED APPETITE: 0
COUGH DISTURBING SLEEP: 0
MYALGIAS: 1
INCREASED ENERGY: 0
ORTHOPNEA: 0
SEIZURES: 0
NERVOUS/ANXIOUS: 1
DECREASED CONCENTRATION: 1
FATIGUE: 1
WHEEZING: 0
ALTERED TEMPERATURE REGULATION: 0
LIGHT-HEADEDNESS: 0
NAUSEA: 1
SHORTNESS OF BREATH: 0
SYNCOPE: 0
DIZZINESS: 0

## 2023-05-05 NOTE — NURSING NOTE
"Reason For Visit:   Chief Complaint   Patient presents with     Consult     Left thigh mass referred by Malinda Sarmiento CNP // first noticed about 6 months ago        Ht 1.68 m (5' 6.14\")   Wt 85.7 kg (189 lb)   BMI 30.37 kg/m      Pain Assessment  Patient Currently in Pain: Yes  0-10 Pain Scale: 1  Primary Pain Location:  (left thigh)      To [facility]    "

## 2023-05-05 NOTE — TELEPHONE ENCOUNTER
Patient is scheduled for surgery with Dr. Howard    Spoke with: Ame    Date of Surgery: 6/1/23    Location: [facility]    Informed patient they will need an adult  Yes    H&P: Scheduled with PCP, Patient will schedule    Additional imaging/appointments: POP Made    Surgery packet: Received     Additional comments: N/A        La Nena Polanco on 5/5/2023 at 10:17 AM

## 2023-05-05 NOTE — LETTER
5/5/2023         RE: Ame Ayala  [address] [address]        Dear Colleague,    Thank you for referring your patient, Ame Ayala, to the [facility] [place]. Please see a copy of my visit note below.    This patient noticed a left thigh mass of several months ago.  She noticed some growth early on but has difficulty telling if it has continued to grow.  She notices some discomfort such as cycling and she is unable to go on the same length bike rides that she is accustomed to doing.  She does not have any other masses.  There is no numbness or tingling associated with this.    On examination the patient is alert oriented has a normal mood and affect and is in no acute distress.  She has a normal gait.  In the left lower extremity there is no edema or erythema.  There is a subtle fullness in the left thigh but she has full motion of the knee.    I reviewed the patient's MRI and she has an intramuscular lipoma within the vastus intermedius muscle.  There is no sign of malignant degeneration.  There is some stranding of muscle fibers going through this lipoma.  It is difficult to say whether it may represent an atypical lipoma or not.    Given this patient's symptoms she is strongly considering having this excised.  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  We discussed the risks of pain stiffness and bleeding and limp.  She will be able to weight-bear as tolerated immediately but may have decreased endurance while this muscle heals and she could also have some tightness in the thigh.  There is also risk of deep venous thrombosis and pulmonary embolism infection and bleeding.  The patient is interested in going forward with this procedure and we will place a case request.  I have answered all of her questions today.    Sincerely,        Teo Howard MD

## 2023-05-05 NOTE — PROGRESS NOTES
This patient noticed a left thigh mass of several months ago.  She noticed some growth early on but has difficulty telling if it has continued to grow.  She notices some discomfort such as cycling and she is unable to go on the same length bike rides that she is accustomed to doing.  She does not have any other masses.  There is no numbness or tingling associated with this.    On examination the patient is alert oriented has a normal mood and affect and is in no acute distress.  She has a normal gait.  In the left lower extremity there is no edema or erythema.  There is a subtle fullness in the left thigh but she has full motion of the knee.    I reviewed the patient's MRI and she has an intramuscular lipoma within the vastus intermedius muscle.  There is no sign of malignant degeneration.  There is some stranding of muscle fibers going through this lipoma.  It is difficult to say whether it may represent an atypical lipoma or not.    Given this patient's symptoms she is strongly considering having this excised.  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  We discussed the risks of pain stiffness and bleeding and limp.  She will be able to weight-bear as tolerated immediately but may have decreased endurance while this muscle heals and she could also have some tightness in the thigh.  There is also risk of deep venous thrombosis and pulmonary embolism infection and bleeding.  The patient is interested in going forward with this procedure and we will place a case request.  I have answered all of her questions today.

## 2023-05-24 ENCOUNTER — OFFICE VISIT (OUTPATIENT)
Dept: INTERNAL MEDICINE | Facility: CLINIC | Age: 28
End: 2023-05-24
Payer: COMMERCIAL

## 2023-05-24 VITALS
TEMPERATURE: 98.1 F | HEIGHT: 65 IN | SYSTOLIC BLOOD PRESSURE: 107 MMHG | BODY MASS INDEX: 28.32 KG/M2 | HEART RATE: 76 BPM | DIASTOLIC BLOOD PRESSURE: 66 MMHG | OXYGEN SATURATION: 96 % | WEIGHT: 170 LBS

## 2023-05-24 DIAGNOSIS — R22.42 LEG MASS, LEFT: Primary | ICD-10-CM

## 2023-05-24 PROCEDURE — 99213 OFFICE O/P EST LOW 20 MIN: CPT | Performed by: INTERNAL MEDICINE

## 2023-05-24 NOTE — NURSING NOTE
"Ame Ayala is a 27 year old female patient that presents today in clinic for the following:    Chief Complaint   Patient presents with     Pre-Op Exam     The patient's allergies and medications were reviewed as noted. A set of vitals were recorded as noted without incident: /66 (BP Location: Right arm, Patient Position: Sitting, Cuff Size: Adult Regular)   Pulse 76   Temp 98.1  F (36.7  C) (Oral)   Ht 1.651 m (5' 5\")   Wt 77.1 kg (170 lb)   SpO2 96%   BMI 28.29 kg/m  . The patient does not have any other questions for the provider.    Radha Lima, EMT at 6:43 PM on 5/24/2023.  Primary care clinic: [phone]  "
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
## 2023-05-31 ENCOUNTER — ANESTHESIA EVENT (OUTPATIENT)
Dept: SURGERY | Facility: AMBULATORY SURGERY CENTER | Age: 28
End: 2023-05-31
Payer: COMMERCIAL
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
## 2023-06-01 ENCOUNTER — ANESTHESIA (OUTPATIENT)
Dept: SURGERY | Facility: AMBULATORY SURGERY CENTER | Age: 28
End: 2023-06-01
Payer: COMMERCIAL

## 2023-06-01 ENCOUNTER — HOSPITAL ENCOUNTER (OUTPATIENT)
Facility: AMBULATORY SURGERY CENTER | Age: 28
Discharge: HOME | End: 2023-06-01
Attending: ORTHOPAEDIC SURGERY
Payer: COMMERCIAL

## 2023-06-01 VITALS
BODY MASS INDEX: 28.32 KG/M2 | DIASTOLIC BLOOD PRESSURE: 67 MMHG | TEMPERATURE: 97.2 F | RESPIRATION RATE: 10 BRPM | OXYGEN SATURATION: 98 % | HEART RATE: 68 BPM | WEIGHT: 170 LBS | SYSTOLIC BLOOD PRESSURE: 108 MMHG | HEIGHT: 65 IN

## 2023-06-01 DIAGNOSIS — R22.42 LEG MASS, LEFT: ICD-10-CM

## 2023-06-01 LAB
HCG UR QL: NEGATIVE
INTERNAL QC OK POCT: NORMAL
POCT KIT EXPIRATION DATE: NORMAL
POCT KIT LOT NUMBER: NORMAL

## 2023-06-01 PROCEDURE — 88304 TISSUE EXAM BY PATHOLOGIST: CPT | Mod: 26 | Performed by: PATHOLOGY

## 2023-06-01 PROCEDURE — 27339 EXC THIGH/KNEE TUM DEP 5CM/>: CPT | Mod: LT

## 2023-06-01 PROCEDURE — 88304 TISSUE EXAM BY PATHOLOGIST: CPT | Mod: TC | Performed by: ORTHOPAEDIC SURGERY

## 2023-06-01 PROCEDURE — 81025 URINE PREGNANCY TEST: CPT | Performed by: PATHOLOGY

## 2023-06-01 PROCEDURE — 27339 EXC THIGH/KNEE TUM DEP 5CM/>: CPT | Mod: LT | Performed by: ORTHOPAEDIC SURGERY

## 2023-06-01 RX ORDER — LIDOCAINE HYDROCHLORIDE 20 MG/ML
INJECTION, SOLUTION INFILTRATION; PERINEURAL PRN
Status: DISCONTINUED | OUTPATIENT
Start: 2023-06-01 | End: 2023-06-01

## 2023-06-01 RX ORDER — SENNA AND DOCUSATE SODIUM 50; 8.6 MG/1; MG/1
1 TABLET, FILM COATED ORAL AT BEDTIME
Qty: 20 TABLET | Refills: 0 | Status: SHIPPED | OUTPATIENT
Start: 2023-06-01 | End: 2023-10-24

## 2023-06-01 RX ORDER — OXYCODONE HYDROCHLORIDE 5 MG/1
10 TABLET ORAL
Status: DISCONTINUED | OUTPATIENT
Start: 2023-06-01 | End: 2023-06-02 | Stop reason: HOSPADM

## 2023-06-01 RX ORDER — DEXAMETHASONE SODIUM PHOSPHATE 4 MG/ML
INJECTION, SOLUTION INTRA-ARTICULAR; INTRALESIONAL; INTRAMUSCULAR; INTRAVENOUS; SOFT TISSUE PRN
Status: DISCONTINUED | OUTPATIENT
Start: 2023-06-01 | End: 2023-06-01

## 2023-06-01 RX ORDER — FENTANYL CITRATE 50 UG/ML
INJECTION, SOLUTION INTRAMUSCULAR; INTRAVENOUS PRN
Status: DISCONTINUED | OUTPATIENT
Start: 2023-06-01 | End: 2023-06-01

## 2023-06-01 RX ORDER — IBUPROFEN 600 MG/1
600 TABLET, FILM COATED ORAL EVERY 6 HOURS PRN
Qty: 100 TABLET | Refills: 0 | Status: SHIPPED | OUTPATIENT
Start: 2023-06-01 | End: 2023-10-24

## 2023-06-01 RX ORDER — BUPIVACAINE HYDROCHLORIDE 2.5 MG/ML
INJECTION, SOLUTION INFILTRATION; PERINEURAL PRN
Status: DISCONTINUED | OUTPATIENT
Start: 2023-06-01 | End: 2023-06-01 | Stop reason: HOSPADM

## 2023-06-01 RX ORDER — KETOROLAC TROMETHAMINE 30 MG/ML
INJECTION, SOLUTION INTRAMUSCULAR; INTRAVENOUS PRN
Status: DISCONTINUED | OUTPATIENT
Start: 2023-06-01 | End: 2023-06-01

## 2023-06-01 RX ORDER — CEFAZOLIN SODIUM 2 G/50ML
2 SOLUTION INTRAVENOUS
Status: COMPLETED | OUTPATIENT
Start: 2023-06-01 | End: 2023-06-01

## 2023-06-01 RX ORDER — ACETAMINOPHEN 325 MG/1
975 TABLET ORAL ONCE
Status: COMPLETED | OUTPATIENT
Start: 2023-06-01 | End: 2023-06-01

## 2023-06-01 RX ORDER — ACETAMINOPHEN 325 MG/1
TABLET ORAL
Qty: 100 TABLET | Refills: 0 | Status: SHIPPED | OUTPATIENT
Start: 2023-06-01 | End: 2023-10-24

## 2023-06-01 RX ORDER — ONDANSETRON 4 MG/1
4 TABLET, ORALLY DISINTEGRATING ORAL EVERY 30 MIN PRN
Status: DISCONTINUED | OUTPATIENT
Start: 2023-06-01 | End: 2023-06-02 | Stop reason: HOSPADM

## 2023-06-01 RX ORDER — CEFAZOLIN SODIUM 2 G/50ML
2 SOLUTION INTRAVENOUS SEE ADMIN INSTRUCTIONS
Status: DISCONTINUED | OUTPATIENT
Start: 2023-06-01 | End: 2023-06-02 | Stop reason: HOSPADM

## 2023-06-01 RX ORDER — SODIUM CHLORIDE, SODIUM LACTATE, POTASSIUM CHLORIDE, CALCIUM CHLORIDE 600; 310; 30; 20 MG/100ML; MG/100ML; MG/100ML; MG/100ML
INJECTION, SOLUTION INTRAVENOUS CONTINUOUS
Status: DISCONTINUED | OUTPATIENT
Start: 2023-06-01 | End: 2023-06-02 | Stop reason: HOSPADM

## 2023-06-01 RX ORDER — PROPOFOL 10 MG/ML
INJECTION, EMULSION INTRAVENOUS PRN
Status: DISCONTINUED | OUTPATIENT
Start: 2023-06-01 | End: 2023-06-01

## 2023-06-01 RX ORDER — ONDANSETRON 2 MG/ML
INJECTION INTRAMUSCULAR; INTRAVENOUS PRN
Status: DISCONTINUED | OUTPATIENT
Start: 2023-06-01 | End: 2023-06-01

## 2023-06-01 RX ORDER — ONDANSETRON 2 MG/ML
4 INJECTION INTRAMUSCULAR; INTRAVENOUS EVERY 30 MIN PRN
Status: DISCONTINUED | OUTPATIENT
Start: 2023-06-01 | End: 2023-06-02 | Stop reason: HOSPADM

## 2023-06-01 RX ORDER — PROPOFOL 10 MG/ML
INJECTION, EMULSION INTRAVENOUS CONTINUOUS PRN
Status: DISCONTINUED | OUTPATIENT
Start: 2023-06-01 | End: 2023-06-01

## 2023-06-01 RX ORDER — OXYCODONE HYDROCHLORIDE 5 MG/1
5 TABLET ORAL
Status: COMPLETED | OUTPATIENT
Start: 2023-06-01 | End: 2023-06-01

## 2023-06-01 RX ORDER — HYDROMORPHONE HYDROCHLORIDE 1 MG/ML
0.2 INJECTION, SOLUTION INTRAMUSCULAR; INTRAVENOUS; SUBCUTANEOUS EVERY 5 MIN PRN
Status: DISCONTINUED | OUTPATIENT
Start: 2023-06-01 | End: 2023-06-02 | Stop reason: HOSPADM

## 2023-06-01 RX ORDER — FENTANYL CITRATE 50 UG/ML
25 INJECTION, SOLUTION INTRAMUSCULAR; INTRAVENOUS EVERY 5 MIN PRN
Status: DISCONTINUED | OUTPATIENT
Start: 2023-06-01 | End: 2023-06-02 | Stop reason: HOSPADM

## 2023-06-01 RX ORDER — FENTANYL CITRATE 50 UG/ML
50 INJECTION, SOLUTION INTRAMUSCULAR; INTRAVENOUS EVERY 5 MIN PRN
Status: DISCONTINUED | OUTPATIENT
Start: 2023-06-01 | End: 2023-06-02 | Stop reason: HOSPADM

## 2023-06-01 RX ORDER — LIDOCAINE 40 MG/G
CREAM TOPICAL
Status: DISCONTINUED | OUTPATIENT
Start: 2023-06-01 | End: 2023-06-02 | Stop reason: HOSPADM

## 2023-06-01 RX ORDER — OXYCODONE HYDROCHLORIDE 5 MG/1
5 TABLET ORAL EVERY 4 HOURS PRN
Qty: 15 TABLET | Refills: 0 | Status: SHIPPED | OUTPATIENT
Start: 2023-06-01 | End: 2023-06-04

## 2023-06-01 RX ORDER — HYDROMORPHONE HYDROCHLORIDE 1 MG/ML
0.4 INJECTION, SOLUTION INTRAMUSCULAR; INTRAVENOUS; SUBCUTANEOUS EVERY 5 MIN PRN
Status: DISCONTINUED | OUTPATIENT
Start: 2023-06-01 | End: 2023-06-02 | Stop reason: HOSPADM

## 2023-06-01 RX ADMIN — PROPOFOL 150 MCG/KG/MIN: 10 INJECTION, EMULSION INTRAVENOUS at 12:53

## 2023-06-01 RX ADMIN — DEXAMETHASONE SODIUM PHOSPHATE 4 MG: 4 INJECTION, SOLUTION INTRA-ARTICULAR; INTRALESIONAL; INTRAMUSCULAR; INTRAVENOUS; SOFT TISSUE at 12:50

## 2023-06-01 RX ADMIN — ONDANSETRON 4 MG: 2 INJECTION INTRAMUSCULAR; INTRAVENOUS at 12:44

## 2023-06-01 RX ADMIN — Medication 0.5 MG: at 13:02

## 2023-06-01 RX ADMIN — CEFAZOLIN SODIUM 2 G: 2 SOLUTION INTRAVENOUS at 12:59

## 2023-06-01 RX ADMIN — LIDOCAINE HYDROCHLORIDE 100 MG: 20 INJECTION, SOLUTION INFILTRATION; PERINEURAL at 12:50

## 2023-06-01 RX ADMIN — ACETAMINOPHEN 975 MG: 325 TABLET ORAL at 11:00

## 2023-06-01 RX ADMIN — KETOROLAC TROMETHAMINE 30 MG: 30 INJECTION, SOLUTION INTRAMUSCULAR; INTRAVENOUS at 13:31

## 2023-06-01 RX ADMIN — PROPOFOL 220 MG: 10 INJECTION, EMULSION INTRAVENOUS at 12:50

## 2023-06-01 RX ADMIN — FENTANYL CITRATE 50 MCG: 50 INJECTION, SOLUTION INTRAMUSCULAR; INTRAVENOUS at 12:56

## 2023-06-01 RX ADMIN — OXYCODONE HYDROCHLORIDE 5 MG: 5 TABLET ORAL at 14:04

## 2023-06-01 RX ADMIN — SODIUM CHLORIDE, SODIUM LACTATE, POTASSIUM CHLORIDE, CALCIUM CHLORIDE: 600; 310; 30; 20 INJECTION, SOLUTION INTRAVENOUS at 11:03

## 2023-06-01 RX ADMIN — FENTANYL CITRATE 50 MCG: 50 INJECTION, SOLUTION INTRAMUSCULAR; INTRAVENOUS at 12:50

## 2023-06-01 NOTE — ANESTHESIA CARE TRANSFER NOTE
Patient: Ame Ayala    Procedure: Procedure(s):  Excision left thigh mass       Diagnosis: Leg mass, left [R22.42]  Diagnosis Additional Information: No value filed.    Anesthesia Type:   General     Note:    Oropharynx: oropharynx clear of all foreign objects and spontaneously breathing  Level of Consciousness: drowsy  Oxygen Supplementation: face mask  Level of Supplemental Oxygen (L/min / FiO2): 4  Independent Airway: airway patency satisfactory and stable  Dentition: dentition unchanged  Vital Signs Stable: post-procedure vital signs reviewed and stable  Report to RN Given: handoff report given  Patient transferred to: PACU  Comments: Uneventful transport   Report to RN  Exchanging well; color natl  Pt responds appropriately to command  IV patent  Lips/teeth/dentition as preop status  Questions answered    Handoff Report: Identifed the Patient, Identified the Reponsible Provider, Reviewed the pertinent medical history, Discussed the surgical course, Reviewed Intra-OP anesthesia mangement and issues during anesthesia, Set expectations for post-procedure period and Allowed opportunity for questions and acknowledgement of understanding      Vitals:  Vitals Value Taken Time   /62 06/01/23 1345   Temp 98    Pulse 70 06/01/23 1346   Resp 15 06/01/23 1346   SpO2 100 % 06/01/23 1346   Vitals shown include unvalidated device data.    Electronically Signed By: KATIE PRITCHARD CRNA  June 1, 2023  1:48 PM

## 2023-06-01 NOTE — BRIEF OP NOTE
[facility] Brief Operative Note    Pre-operative diagnosis: Leg mass, left [R22.42]   Post-operative diagnosis Same   Procedure: Procedure(s):  Excision left thigh mass   Surgeon(s): Surgeon(s) and Role:     * Teo Howard MD - Primary     * Jaspreet Crystal MD - Resident - Assisting   Estimated blood loss: 5 mL    Specimens: ID Type Source Tests Collected by Time Destination   1 : LEFT THIGH MASS Tissue Thigh, Left SURGICAL PATHOLOGY EXAM Teo Howard MD 6/1/2023  1:18 PM       Findings: See dictation.    WBAT  APAP, Ibu, Oxy for pain  No dvt ppx indicated  Dressing on x7days  Discharge from PACU.  Follow up in 2 weeks with Anita.    Jaspreet Crystal MD  PGY-4 Orthopaedic Surgery

## 2023-06-01 NOTE — ANESTHESIA POSTPROCEDURE EVALUATION
Patient: Ame Ayala    Procedure: Procedure(s):  Excision left thigh mass       Anesthesia Type:  General    Note:  Disposition: Outpatient   Postop Pain Control: Uneventful            Sign Out: Well controlled pain   PONV: No   Neuro/Psych: Uneventful            Sign Out: Acceptable/Baseline neuro status   Airway/Respiratory: Uneventful            Sign Out: Acceptable/Baseline resp. status   CV/Hemodynamics: Uneventful            Sign Out: Acceptable CV status; No obvious hypovolemia; No obvious fluid overload   Other NRE:    DID A NON-ROUTINE EVENT OCCUR?            Last vitals:  Vitals Value Taken Time   /63 06/01/23 1400   Temp 36  C (96.8  F) 06/01/23 1400   Pulse 77 06/01/23 1408   Resp 14 06/01/23 1408   SpO2 98 % 06/01/23 1408   Vitals shown include unvalidated device data.    Electronically Signed By: Nahid Shields MD  June 1, 2023  3:33 PM

## 2023-06-01 NOTE — DISCHARGE INSTRUCTIONS
Procedure Performed: Excision Left Leg Mass  Attending Surgeon: MD Anita  Date: 6/1/2023    DIAGNOSIS  1. Leg mass, left        MEDICATIONS   Resume all home medications as directed unless otherwise instructed during this hospitalization. If there is any question, double check with your primary care provider.  Start new discharge medications as directed.    Take 3 tablets of 325 mg Tylenol (acetaminophen) three times per day (total of 9 tablets per 24 hour period) and 3 tablets of Motrin (Ibuprofen) 200 mg three times per day (total of 9 tablets per 24 hour period).    For breakthrough pain use narcotic pain medication if prescribed.    Do not drive or operate machinery while taking narcotic pain medications.   If you are taking other Tylenol containing medicines at home, be sure NOT to exceed 4 gram's (4000 milligrams) of Tylenol per day.   If you are taking pain medications, be sure to take Colace (docusate sodium) as well to prevent constipation. If constipated, try adding another cathartic or enema.  If nausea and vomiting, call the hospital or seek medical attention.    ACTIVITY   Weight bearing: As Tolerated    DIET  Resume same diet prior to your hospital admission.    WOUND   Leave dressing on for 7 days, then leave open to the air.  Watch for signs and symptoms of infection of your wounds including; pain, redness, swelling, drainage or fever.  If you notice any of these symptoms please call or seek medical attention.    Keep wound clean, dry, and intact.  Do not submerge wounds in water until they are healed. No baths, soaking, swimming, or prolonged water exposure for 4 weeks after surgery.    RETURN   Follow-up with Orthopedic Clinic as directed.     Future Appointments   Date Time Provider Department Center   6/15/2023  3:10 PM Brittney Sandoval PA-C [facility]   7/28/2023  1:00 PM Leticia Sena AuD [facility]       Call the [facility] at [phone] during  business hours for any symptoms such as:    * Fevers with Temperature greater than 101.5 degrees.   * Pus drainage from wound site.   * Severe pain, not controlled by medication.   * Persistent nausea, vomiting and inablility to tolerate fluids.    If you are receiving care in [place], you may call the Orthopedic clinic at [phone] Option #2.    FOR URGENT PROBLEMS ONLY, after hours or on weekends call the hospital  at [phone] and ask to speak with the orthopedic resident on call.    You may also be seen at our Orthopedic Walk-In clinic that runs 7 days per week from 7a-5p at [address] [place] [id number]. You can call the Walk-In Clinic at [phone].    [facility]  Home Care Following Anesthesia  For 24 hours after surgery:  Get plenty of rest.  A responsible adult must stay with you for at least 24 hours after you leave the surgery center.  Do not drive or use heavy equipment.  If you have weakness or tingling, don't drive or use heavy equipment until this feeling goes away.   Do not drink alcohol.   Avoid strenuous or risky activities.  Ask for help when climbing stairs.  You may feel lightheaded.  IF so, sit for a few minutes before standing.  Have someone help you get up.   If you have nausea (feel sick to your stomach): Drink only clear liquids such as apple juice, ginger ale, broth or 7-Up.  Rest may also help.  Be sure to drink enough fluids.  Move to a regular diet as you feel able.   You may have a slight fever.  Call the doctor if your fever is over 100 F (37.7 C) (taken under the tongue) or lasts longer than 24 hours.  You may have a dry mouth, a sore throat, muscle aches or trouble sleeping. These should go away after 24 hours.  Do not make important or legal decisions.   It is recommended to avoid smoking.   If you use hormonal birth control (such as the pill, patch, ring or implants):  You will need a second form of birth control  "for 7 days (condoms, a diaphragm or contraceptive foam).  While in the surgery center, you received a medicine called Sugammadex.  Hormonal birth control (such as the pill, patch, ring or implants) will not work as well for a week after taking this medicine.  Today you received a Marcaine or bupivacaine block to numb the nerves near your surgery site.  This is a block using local anesthetic or \"numbing\" medication injected around the nerves to anesthetize or \"numb\" the area supplied by those nerves.  This block is injected into the muscle layer near your surgical site.  The medication may numb the location where you had surgery for 6-18 hours, but may last up to 24 hours.  If your surgical site is an arm or leg you should be careful with your affected limb, since it is possible to injure your limb without being aware of it due to the numbing.  Until full feeling returns, you should guard against bumping or hitting your limb, and avoid extreme hot or cold temperatures on the skin.  As the block wears off, the feeling will return as a tingling or prickly sensation near your surgical site.  You will experience more discomfort from your incision as the feeling returns.  You may want to take a pain pill (a narcotic or Tylenol if this was prescribed by your surgeon) when you start to experience mild pain before the pain beccomes more severe.  If your pain medications do not control your pain you should notifiy your surgeon.    Tips for taking pain medications  To get the best pain relief possible, remember these points:  Take pain medications as directed, before pain becomes severe.  Pain medication can upset your stomach: taking it with food may help.  Constipation is a common side effect of pain medication. Drink plenty of  fluids.  Eat foods high in fiber. Take a stool softener if recommended by your doctor or pharmacist.  Do not drink alcohol, drive or operate machinery while taking pain medications.  Ask about other " ways to control pain, such as with heat, ice or relaxation.    Tylenol/Acetaminophen Consumption  To help encourage the safe use of acetaminophen, the makers of TYLENOL  have lowered the maximum daily dose for single-ingredient Extra Strength TYLENOL  (acetaminophen) products sold in the [place] from 8 pills per day (4,000 mg) to 6 pills per day (3,000 mg). The dosing interval has also changed from 2 pills every 4-6 hours to 2 pills every 6 hours.  If you feel your pain relief is insufficient, you may take Tylenol/Acetaminophen in addition to your narcotic pain medication.   Be careful not to exceed 3,000 mg of Tylenol/Acetaminophen in a 24 hour period from all sources.  If you are taking extra strength Tylenol/acetaminophen (500 mg), the maximum dose is 6 tablets in 24 hours.  If you are taking regular strength acetaminophen (325 mg), the maximum dose is 9 tablets in 24 hours.    Call a doctor for any of the following:  Signs of infection (fever, growing tenderness at the surgery site, a large amount of drainage or bleeding, severe pain, foul-smelling drainage, redness, swelling).  It has been over 8 to 10 hours since surgery and you are still not able to urinate (pass water).  Headache for over 24 hours.  Numbness, tingling or weakness the day after surgery (if you had spinal anesthesia).  Signs of Covid-19 infection (temperature over 100 degrees, shortness of breath, cough, loss of taste/smell, generalized body aches, persistent headache, chills, sore throat, nausea/vomiting/diarrhea)  Your doctor is:       Dr. Teo Howard, Orthopaedics: [phone]               Or dial [phone] and ask for the resident on call for:  Orthopaedics  For emergency care, call the:  [facility] Emergency Department: [phone] (TTY for hearing impaired: [phone])  Tylenol 975 mg given at 11 am.  Next available dose at 5 pm.

## 2023-06-01 NOTE — INTERVAL H&P NOTE
"I have reviewed the surgical (or preoperative) H&P that is linked to this encounter, and examined the patient. There are no significant changes    Clinical Conditions Present on Arrival:  Clinically Significant Risk Factors Present on Admission                  # Overweight: Estimated body mass index is 28.29 kg/m  as calculated from the following:    Height as of this encounter: 1.651 m (5' 5\").    Weight as of this encounter: 77.1 kg (170 lb).       "

## 2023-06-01 NOTE — OP NOTE
DATE OF SURGERY: 6/1/2023    PREOPERATIVE DIAGNOSIS: Left thigh intramuscular lipoma    POSTOPERATIVE DIAGNOSIS: Left thigh intramuscular lipoma    PROCEDURE: Excision left thigh mass, deep, greater than 5 cm    SURGEON: Teo Howard MD     ASSISTANT: Jaspreet Crystal MD    PATIENT HISTORY: This patient has a history of an uncomfortable growing left thigh mass.  MRI shows an intramuscular lipoma down on the periosteum of the femur.  She presents now to have this excised understand the risks of bleed infection pain numbness tingling limp weakness.    DESCRIPTION OF PROCEDURE: The patient underwent successful induction of anesthesia.  The left leg was washed and sterilely prepped and draped.  We made an incision over the palpable mass sharply divided the subcutaneous tissue with cautery and opened up the fascia.  The rectus femoris was retracted laterally.  We then went through the tendinous portion of the intermedius down onto this mass.  There is some muscle adherent to the mass which was divided close to the pseudocapsule.  I elevated the mass off of the periosteum and divided the remaining soft tissue connections.  It was removed in 1 piece and sent to pathology.  We then irrigated and cauterized a few small bleeding vessels.  The intermedius tendon was repaired with 0 Vicryl after copiously irrigating.  The superficial fascia was also repaired with Vicryl as was the subcutaneous tissue.  Monocryl was used in the skin followed by Steri-Strips and a sterile dry dressing.  The patient was extubated and taken to the recovery room in stable condition.  The estimated blood loss is less than 10 mL.  The specimen was sent in formalin to pathology.  I was present for all critical portions of the procedure.    Teo Howard MD

## 2023-06-05 LAB
PATH REPORT.COMMENTS IMP SPEC: NORMAL
PATH REPORT.FINAL DX SPEC: NORMAL
PATH REPORT.GROSS SPEC: NORMAL
PATH REPORT.MICROSCOPIC SPEC OTHER STN: NORMAL
PATH REPORT.RELEVANT HX SPEC: NORMAL
PHOTO IMAGE: NORMAL

## 2023-06-12 NOTE — NURSING NOTE
A call was placed to the patient and pre-op teaching was performed over the phone.    Teaching Flowsheet   Relevant Diagnosis: Pre-Op Teaching  Teaching Topic: excision left thigh mass     Person(s) involved in teaching:   Patient     Motivation Level:  Asks Questions: Yes  Eager to Learn: Yes  Cooperative: Yes  Receptive (willing/able to accept information): Yes  Any cultural factors/Adventist beliefs that may influence understanding or compliance? No  Comments: none     Patient demonstrates understanding of the following:  Reason for the appointment, diagnosis and treatment plan: Yes  Knowledge of proper use of medications and conditions for which they are ordered (with special attention to potential side effects or drug interactions): Yes  Which situations necessitate calling provider and whom to contact: Yes- discussed the stoplight tool to help assist with this.      Teaching Concerns Addressed:   Comments: patient will discuss how to take medications on day of surgery.  Patient had concerns about narcotic medication after surgery, she is recovering from addiction.  Patient advised to discuss with Dr. Howard or Candi, and discuss possible block with anesthesia if able to do that.       Proper use of surgical scrub explain and provided to patient.    Pain management techniques: Yes  Wound Care: Yes  How and/when to access community resources: Yes     Instructional Materials Used/Given:      - Important contact info/ phone numbers  - Map/ location of surgery  - Medications to avoid  - Showering instructions  - Stop light tool    Additionally the following was discussed with patient:  - partner, will be driving the patient to surgery and staying with them for 24 hours.        -Next step: schedule a Pre-Op appointment with PCP          Is the patient due for a refill? Yes    Was the patient seen the past year? Yes    Date of last office visit: 8/8/2022    Does the patient have an upcoming appointment?  Yes   If yes, When? 9/21/2023    Provider to refill:BE    Does the patients insurance require a 100 day supply?  Yes

## 2023-06-15 ENCOUNTER — OFFICE VISIT (OUTPATIENT)
Dept: ORTHOPEDICS | Facility: CLINIC | Age: 28
End: 2023-06-15
Payer: COMMERCIAL

## 2023-06-15 DIAGNOSIS — D17.9 INTRAMUSCULAR LIPOMA: Primary | ICD-10-CM

## 2023-06-15 PROCEDURE — 99024 POSTOP FOLLOW-UP VISIT: CPT | Performed by: PHYSICIAN ASSISTANT

## 2023-06-15 NOTE — PROGRESS NOTES
Chief Complaint: wound check  POSTOPERATIVE DIAGNOSIS: Left thigh intramuscular lipoma     6/1/23 PROCEDURE: Excision left thigh mass, deep, greater than 5 cm    HPI: Ame is a 27-year-old female who is here 2 weeks status post above procedure by Dr. Howard.  Patient reports that overall she is doing well.  She does have some soreness, swelling and stiffness in the left quad.  She also feels some burning and occasional sharp pain in the lateral lower thigh.  No numbness.  She has difficulty with full knee flexion.  She is able to do a straight leg raise.  She has questions about attending a music festival next week.  She is back at work.  No other concerns    Physical Exam: Ame is a healthy-appearing 27-year-old female who is alert and oriented no apparent distress.  She has a nonantalgic reciprocal gait without gait assistance today.  Her anterior left thigh incision is healing well with no erythema or drainage.  There is mild to moderate left thigh swelling.  No significant ecchymosis.  Mild tenderness to palpation.  She has able to perform active knee extension and knee flexion.  She is limited approximately 10 degrees in her knee flexion compared to the uninvolved side.  She can perform a straight leg raise but it is uncomfortable for her.  No calf tenderness.    Pathology:   Final Diagnosis   A. Soft tissue, left thigh mass, resection:  - Intramuscular lipoma  - Negative for atypia or malignancy      Impression: 27-year-old female doing well status post excision of left anterior thigh intramuscular lipoma    Plan: Ame can continue to progress her activities.  I think she would benefit from physical therapy to work on some desensitization, gait training, strengthening and stretching.  I showed her some initial exercises to do until then.  I think she can go to the music festival, she should try to wear some either compression shorts or an Ace wrap to help with swelling.  If she can ice and elevate at  the end of the day I think that would be helpful.  Otherwise there is no significant restrictions.  We would not expect this tumor to recur.  She can follow-up as needed and call with any concerns.  All questions answered.

## 2023-06-15 NOTE — LETTER
6/15/2023         RE: Ame Ayala  [address]  [address]  [place] [address]        Dear Colleague,    Thank you for referring your patient, Ame Ayala, to the [facility] [place]. Please see a copy of my visit note below.    Chief Complaint: wound check  POSTOPERATIVE DIAGNOSIS: Left thigh intramuscular lipoma     6/1/23 PROCEDURE: Excision left thigh mass, deep, greater than 5 cm    HPI: Ame is a 27-year-old female who is here 2 weeks status post above procedure by Dr. Howard.  Patient reports that overall she is doing well.  She does have some soreness, swelling and stiffness in the left quad.  She also feels some burning and occasional sharp pain in the lateral lower thigh.  No numbness.  She has difficulty with full knee flexion.  She is able to do a straight leg raise.  She has questions about attending a music festival next week.  She is back at work.  No other concerns    Physical Exam: Ame is a healthy-appearing 27-year-old female who is alert and oriented no apparent distress.  She has a nonantalgic reciprocal gait without gait assistance today.  Her anterior left thigh incision is healing well with no erythema or drainage.  There is mild to moderate left thigh swelling.  No significant ecchymosis.  Mild tenderness to palpation.  She has able to perform active knee extension and knee flexion.  She is limited approximately 10 degrees in her knee flexion compared to the uninvolved side.  She can perform a straight leg raise but it is uncomfortable for her.  No calf tenderness.    Pathology:   Final Diagnosis   A. Soft tissue, left thigh mass, resection:  - Intramuscular lipoma  - Negative for atypia or malignancy      Impression: 27-year-old female doing well status post excision of left anterior thigh intramuscular lipoma    Plan: Ame can continue to progress her activities.  I think she would benefit from physical therapy to work on some desensitization,  gait training, strengthening and stretching.  I showed her some initial exercises to do until then.  I think she can go to the music festival, she should try to wear some either compression shorts or an Ace wrap to help with swelling.  If she can ice and elevate at the end of the day I think that would be helpful.  Otherwise there is no significant restrictions.  We would not expect this tumor to recur.  She can follow-up as needed and call with any concerns.  All questions answered.        Brittney Sandoval PA-C

## 2023-06-15 NOTE — NURSING NOTE
Reason For Visit:   Chief Complaint   Patient presents with     Surgical Followup     2 wk post-op left thigh mass excision DOS 6/1/23        There were no vitals taken for this visit.    Pain Assessment  Patient Currently in Pain: Yes  0-10 Pain Scale: 2  Primary Pain Location:  (left thigh)      To [facility]

## 2023-06-16 ENCOUNTER — THERAPY VISIT (OUTPATIENT)
Dept: PHYSICAL THERAPY | Facility: CLINIC | Age: 28
End: 2023-06-16
Attending: PHYSICIAN ASSISTANT
Payer: COMMERCIAL

## 2023-06-16 DIAGNOSIS — D17.9 INTRAMUSCULAR LIPOMA: ICD-10-CM

## 2023-06-16 DIAGNOSIS — Z47.89 AFTERCARE FOLLOWING SURGERY OF THE MUSCULOSKELETAL SYSTEM: ICD-10-CM

## 2023-06-16 DIAGNOSIS — R22.42 LEG MASS, LEFT: Primary | ICD-10-CM

## 2023-06-16 PROCEDURE — 97161 PT EVAL LOW COMPLEX 20 MIN: CPT | Mod: GP | Performed by: PHYSICAL THERAPIST

## 2023-06-16 PROCEDURE — 97530 THERAPEUTIC ACTIVITIES: CPT | Mod: GP | Performed by: PHYSICAL THERAPIST

## 2023-06-16 PROCEDURE — 97140 MANUAL THERAPY 1/> REGIONS: CPT | Mod: GP | Performed by: PHYSICAL THERAPIST

## 2023-06-16 NOTE — PROGRESS NOTES
PHYSICAL THERAPY EVALUATION  Type of Visit: Evaluation    See electronic medical record for Abuse and Falls Screening details.    Subjective         Physical Therapy Initial Evaluation: Subjective History  Date of Surgery: 2023.    Surgical Procedure/Limb: Lipoma resection, left thigh, non-malignant  Surgeon Name: Dr. Howard  Precautions/Restrictions: None, progress as tolerated.     Average Daily Pain Levels: 1-2/10 (Location: lateral distal thigh; Quality: Burning and Aching/Throbbing)  Other Symptoms: Pressure on the lateral thigh with burning symptoms (pressure with leaning forward and with femoral adduction). Denies numbness/tingling.   Symptom Mgmt Strategies: Activity modification, ibuprofen, advil (stopped about 5 days ago).     Prior orthopaedic history/procedures: See Epic  Prior non-operative management: See Epic  Next MD Appt Date: 6 weeks post-op     Functional limitations following procedure: Is trying to push it without walking but feels her knee give out. Stairs, going reciprocal but fatigues, minimal pain.   Previous level of function: Biking (pre-surgery her muscle would get fatigued),     Patient Employment: At [employer].   Desired Physical Activity (Goals): Biking, swimming, walking, weight lifting. Traveling to a [employer] next (in [place])     Post-Operative Physical Therapy Examination    Physical Mobility Status  Gait: Stiff leg gait pattern, antalgic gait pattern  Transfers:  Independent  Squattin deg, posterior hip dominant squat.     Anthropometric Measures     Right Left Difference   Joint ROM      Hyperextension 0 deg 0 deg 0 deg   Extension 0 deg 0 deg 0 deg   Flexion 137 deg 125 deg 12 deg       Quadriceps Muscle Activation Right Left   Isometric Quad Activation Normal Poor and Depressed intensity   Straight Leg Raising No extensor lag Extensor lag of 10 deg     Status of Incision: Clean & healing and restricted soft tissue surrounding the  incision    Hip ROM:   Right ER: 84, IR 31  Left ER: 65, IR: 32       Objective       Assessment & Plan   CLINICAL IMPRESSIONS   Medical Diagnosis: S/P left this lipoma    Treatment Diagnosis: Thigh pain, left, abnormal gait, weakness   Impression/Assessment: Patient is a 27 year old female with left thigh complaints.  The following significant findings have been identified: Pain, Decreased ROM/flexibility, Decreased strength, Impaired balance, Edema and Impaired gait. These impairments interfere with their ability to perform self care tasks, work tasks, recreational activities, household chores, household mobility and community mobility as compared to previous level of function.     Clinical Decision Making (Complexity):   Clinical Presentation: Stable/Uncomplicated  Clinical Presentation Rationale: based on medical and personal factors listed in PT evaluation  Clinical Decision Making (Complexity): Low complexity    PLAN OF CARE  Treatment Interventions:  Interventions: Gait Training, Manual Therapy, Neuromuscular Re-education, Therapeutic Activity, Therapeutic Exercise    Long Term Goals     PT Goal 1  Goal Identifier: Ambulation  Goal Description: Patient to be able to walk for 3 miles without increased thigh pain, swelling or abnormal gait  Rationale: to maximize safety and independence within the community  Target Date: 08/14/23      Frequency of Treatment: 1x/week  Duration of Treatment: 8 weeks    Recommended Referrals to Other Professionals: Physical Therapy  Education Assessment:   Learner/Method: Patient;No Barriers to Learning    Risks and benefits of evaluation/treatment have been explained.   Patient/Family/caregiver agrees with Plan of Care.     Evaluation Time:     PT Eval, Low Complexity Minutes (07247): 13    Signing Clinician: Alma Chery PT      [facility]                                                                                   OUTPATIENT PHYSICAL  THERAPY      PLAN OF TREATMENT FOR OUTPATIENT REHABILITATION   Patient's Last Name, First Name, Ame Rain YOB: 1995   Provider's Name   [facility]   Medical Record No.  [id number]     Onset Date: 06/01/23  Start of Care Date: 06/16/23     Medical Diagnosis:  S/P left this lipoma      PT Treatment Diagnosis:  Thigh pain, left, abnormal gait, weakness Plan of Treatment  Frequency/Duration: 1x/week/ 8 weeks    Certification date from 06/16/23 to 08/14/23         See note for plan of treatment details and functional goals     Alma Chery, PT                         I CERTIFY THE NEED FOR THESE SERVICES FURNISHED UNDER        THIS PLAN OF TREATMENT AND WHILE UNDER MY CARE     (Physician attestation of this document indicates review and certification of the therapy plan).                  Referring Provider:  Brittney Francisco*      Initial Assessment  See Epic Evaluation- Start of Care Date: 06/16/23

## 2023-06-19 PROBLEM — D17.9 INTRAMUSCULAR LIPOMA: Status: ACTIVE | Noted: 2023-06-19

## 2023-06-19 PROBLEM — Z47.89 AFTERCARE FOLLOWING SURGERY OF THE MUSCULOSKELETAL SYSTEM: Status: ACTIVE | Noted: 2023-06-19

## 2023-06-29 ENCOUNTER — THERAPY VISIT (OUTPATIENT)
Dept: PHYSICAL THERAPY | Facility: CLINIC | Age: 28
End: 2023-06-29
Payer: COMMERCIAL

## 2023-06-29 DIAGNOSIS — D17.9 INTRAMUSCULAR LIPOMA: ICD-10-CM

## 2023-06-29 DIAGNOSIS — Z47.89 AFTERCARE FOLLOWING SURGERY OF THE MUSCULOSKELETAL SYSTEM: Primary | ICD-10-CM

## 2023-06-29 PROCEDURE — 97140 MANUAL THERAPY 1/> REGIONS: CPT | Mod: GP | Performed by: PHYSICAL THERAPIST

## 2023-06-29 PROCEDURE — 97110 THERAPEUTIC EXERCISES: CPT | Mod: GP | Performed by: PHYSICAL THERAPIST

## 2023-07-06 ENCOUNTER — THERAPY VISIT (OUTPATIENT)
Dept: PHYSICAL THERAPY | Facility: CLINIC | Age: 28
End: 2023-07-06
Payer: COMMERCIAL

## 2023-07-06 DIAGNOSIS — Z47.89 AFTERCARE FOLLOWING SURGERY OF THE MUSCULOSKELETAL SYSTEM: Primary | ICD-10-CM

## 2023-07-06 DIAGNOSIS — D17.9 INTRAMUSCULAR LIPOMA: ICD-10-CM

## 2023-07-06 PROCEDURE — 97110 THERAPEUTIC EXERCISES: CPT | Mod: GP | Performed by: PHYSICAL THERAPIST

## 2023-07-06 PROCEDURE — 97140 MANUAL THERAPY 1/> REGIONS: CPT | Mod: GP | Performed by: PHYSICAL THERAPIST

## 2023-07-06 PROCEDURE — 97530 THERAPEUTIC ACTIVITIES: CPT | Mod: GP | Performed by: PHYSICAL THERAPIST

## 2023-07-13 ENCOUNTER — THERAPY VISIT (OUTPATIENT)
Dept: PHYSICAL THERAPY | Facility: CLINIC | Age: 28
End: 2023-07-13
Payer: COMMERCIAL

## 2023-07-13 DIAGNOSIS — D17.9 INTRAMUSCULAR LIPOMA: ICD-10-CM

## 2023-07-13 DIAGNOSIS — Z47.89 AFTERCARE FOLLOWING SURGERY OF THE MUSCULOSKELETAL SYSTEM: Primary | ICD-10-CM

## 2023-07-13 PROCEDURE — 97110 THERAPEUTIC EXERCISES: CPT | Mod: GP | Performed by: PHYSICAL THERAPIST

## 2023-07-13 PROCEDURE — 97140 MANUAL THERAPY 1/> REGIONS: CPT | Mod: GP | Performed by: PHYSICAL THERAPIST

## 2023-07-13 PROCEDURE — 97530 THERAPEUTIC ACTIVITIES: CPT | Mod: GP | Performed by: PHYSICAL THERAPIST

## 2023-07-27 ENCOUNTER — MYC MEDICAL ADVICE (OUTPATIENT)
Dept: INTERNAL MEDICINE | Facility: CLINIC | Age: 28
End: 2023-07-27

## 2023-07-28 ENCOUNTER — OFFICE VISIT (OUTPATIENT)
Dept: AUDIOLOGY | Facility: CLINIC | Age: 28
End: 2023-07-28
Attending: NURSE PRACTITIONER
Payer: COMMERCIAL

## 2023-07-28 DIAGNOSIS — H93.8X9 EAR PRESSURE: ICD-10-CM

## 2023-07-28 DIAGNOSIS — H91.93 SUBJECTIVE HEARING CHANGE OF BOTH EARS: Primary | ICD-10-CM

## 2023-07-28 DIAGNOSIS — H93.19 TINNITUS: ICD-10-CM

## 2023-07-28 PROCEDURE — 92550 TYMPANOMETRY & REFLEX THRESH: CPT | Performed by: AUDIOLOGIST

## 2023-07-28 PROCEDURE — 92557 COMPREHENSIVE HEARING TEST: CPT | Performed by: AUDIOLOGIST
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
## 2023-08-03 DIAGNOSIS — S06.0X0S CONCUSSION W/O COMA, SEQUELA (H): Primary | ICD-10-CM

## 2023-08-10 ENCOUNTER — THERAPY VISIT (OUTPATIENT)
Dept: PHYSICAL THERAPY | Facility: CLINIC | Age: 28
End: 2023-08-10
Payer: COMMERCIAL

## 2023-08-10 DIAGNOSIS — D17.9 INTRAMUSCULAR LIPOMA: ICD-10-CM

## 2023-08-10 DIAGNOSIS — Z47.89 AFTERCARE FOLLOWING SURGERY OF THE MUSCULOSKELETAL SYSTEM: Primary | ICD-10-CM

## 2023-08-10 PROCEDURE — 97110 THERAPEUTIC EXERCISES: CPT | Mod: 59 | Performed by: PHYSICAL THERAPIST

## 2023-08-10 PROCEDURE — 97530 THERAPEUTIC ACTIVITIES: CPT | Mod: GP | Performed by: PHYSICAL THERAPIST

## 2023-08-23 ENCOUNTER — THERAPY VISIT (OUTPATIENT)
Dept: PHYSICAL THERAPY | Facility: CLINIC | Age: 28
End: 2023-08-23
Payer: COMMERCIAL

## 2023-08-23 DIAGNOSIS — D17.9 INTRAMUSCULAR LIPOMA: ICD-10-CM

## 2023-08-23 DIAGNOSIS — Z47.89 AFTERCARE FOLLOWING SURGERY OF THE MUSCULOSKELETAL SYSTEM: Primary | ICD-10-CM

## 2023-08-23 PROCEDURE — 97530 THERAPEUTIC ACTIVITIES: CPT | Mod: GP | Performed by: PHYSICAL THERAPIST

## 2023-08-23 PROCEDURE — 97110 THERAPEUTIC EXERCISES: CPT | Mod: 59 | Performed by: PHYSICAL THERAPIST

## 2023-08-24 NOTE — PROGRESS NOTES
08/23/23 0500   Appointment Info   Signing clinician's name / credentials Alma Chery, PT   Total/Authorized Visits 8 (PT)   Visits Used 4   Medical Diagnosis S/P left thigh lipoma   PT Tx Diagnosis Thigh pain, left, abnormal gait, weakness   Precautions/Limitations None, progress as tolerated   Quick Adds Certification   Progress Note/Certification   Start of Care Date 06/16/23   Onset of illness/injury or Date of Surgery 06/01/23   Therapy Frequency 1x/week   Predicted Duration 3 weeks   Certification date from 08/14/23   Certification date to 09/04/23   PT Goal 1   Goal Identifier Return to activities   Goal Description Patient to be able to bike 8 miles including an uphill without increased pain/soreness   Rationale to maximize safety and independence within the community   Goal Progress Pushed up hill and had incisional soreness. Has not tried distance.   Target Date 09/04/23   Subjective Report   Subjective Report Things are all feeling good. Is progressing in lifting, running, biking, daily activities. After squatting, the front of the quad is feeling numb/tingling when she touches,   Objective Measures   Objective Measures Objective Measure 1;Objective Measure 2;Objective Measure 3   Objective Measure 1   Objective Measure Knee ROM + Hip ROM   Details Symmetrical and pain free   Objective Measure 2   Objective Measure Quad activation.   Details Normal   Objective Measure 3   Objective Measure SL squat depth   Details Left: 64 deg, Right 69o deg. Lack of SL control on left compared to right.   Treatment Interventions (PT)   Interventions Therapeutic Procedure/Exercise;Therapeutic Activity;Manual Therapy   Therapeutic Procedure/Exercise   Therapeutic Procedures: strength, endurance, ROM, flexibillity minutes (16494) 14   Ther Proc 1 OK'd advancements in all weight training.   Ther Proc 1 - Details Biking: build up.   PTRx Ther Proc 1 SL deadlift with KB   PTRx Ther Proc 1 - Details SL squat   PTRx Ther  Proc 2 Est two strength training exercise to prioritize.   PTRx Ther Proc 2 - Details Work on SL control and tempo to improve in speed and power in running.   Skilled Intervention Appropriate level of activity for post-op status   Therapeutic Activity   Therapeutic Activities: dynamic activities to improve functional performance minutes (96359) 10   Ther Act 1 - Details Introduction to running, Est of plan to increase.   PTRx Ther Act 1 Foam Roller Piriformis   PTRx Ther Act 1 - Details No Notes   PTRx Ther Act 2 Foam Roller Quadriceps   PTRx Ther Act 2 - Details No Notes   PTRx Ther Act 3 Scar mobs continue.   PTRx Ther Act 3 - Details Biking increase program.   Skilled Intervention Appropriate levels of intervention.   Manual Therapy   Skilled Intervention Interventions for improved soft tissue mobilization.   Education   Learner/Method Patient;No Barriers to Learning   Total Session Time   Timed Code Treatment Minutes 24   Total Treatment Time (sum of timed and untimed services) 24         [facility]                                                                                   OUTPATIENT PHYSICAL THERAPY    PLAN OF TREATMENT FOR OUTPATIENT REHABILITATION   Patient's Last Name, First Name, Ame Rain YOB: 1995   Provider's Name   [facility]   Medical Record No.  [id number]     Onset Date: 06/01/23  Start of Care Date: 06/16/23     Medical Diagnosis:  S/P left thigh lipoma      PT Treatment Diagnosis:  Thigh pain, left, abnormal gait, weakness Plan of Treatment  Frequency/Duration: 1x/week/ 3 weeks    Certification date from 08/14/23 to 09/04/23         See note for plan of treatment details and functional goals     Alma Chery, PT                         I CERTIFY THE NEED FOR THESE SERVICES FURNISHED UNDER        THIS PLAN OF TREATMENT AND WHILE UNDER MY CARE     (Physician attestation of this document indicates review  and certification of the therapy plan).                Referring Provider:  Brittney Francisco*      Initial Assessment  See Epic Evaluation- Start of Care Date: 06/16/23

## 2023-10-10 ENCOUNTER — ALLIED HEALTH/NURSE VISIT (OUTPATIENT)
Dept: FAMILY MEDICINE | Facility: CLINIC | Age: 28
End: 2023-10-10
Payer: COMMERCIAL

## 2023-10-10 ENCOUNTER — LAB (OUTPATIENT)
Dept: LAB | Facility: CLINIC | Age: 28
End: 2023-10-10
Payer: COMMERCIAL

## 2023-10-10 DIAGNOSIS — Z11.1 VISIT FOR MANTOUX TEST: ICD-10-CM

## 2023-10-10 DIAGNOSIS — Z23 ENCOUNTER FOR IMMUNIZATION: Primary | ICD-10-CM

## 2023-10-10 DIAGNOSIS — F39 EPISODIC MOOD DISORDER (H): ICD-10-CM

## 2023-10-10 DIAGNOSIS — E55.9 VITAMIN D DEFICIENCY: ICD-10-CM

## 2023-10-10 DIAGNOSIS — Z01.84 IMMUNITY STATUS TESTING: ICD-10-CM

## 2023-10-10 LAB
FOLATE SERPL-MCNC: 12.2 NG/ML (ref 4.6–34.8)
HBA1C MFR BLD: 5.5 % (ref 0–5.6)

## 2023-10-10 PROCEDURE — 84630 ASSAY OF ZINC: CPT | Mod: 90

## 2023-10-10 PROCEDURE — 86580 TB INTRADERMAL TEST: CPT

## 2023-10-10 PROCEDURE — 90686 IIV4 VACC NO PRSV 0.5 ML IM: CPT

## 2023-10-10 PROCEDURE — 83540 ASSAY OF IRON: CPT

## 2023-10-10 PROCEDURE — 83735 ASSAY OF MAGNESIUM: CPT

## 2023-10-10 PROCEDURE — 83036 HEMOGLOBIN GLYCOSYLATED A1C: CPT

## 2023-10-10 PROCEDURE — 83550 IRON BINDING TEST: CPT

## 2023-10-10 PROCEDURE — 82306 VITAMIN D 25 HYDROXY: CPT

## 2023-10-10 PROCEDURE — 84439 ASSAY OF FREE THYROXINE: CPT

## 2023-10-10 PROCEDURE — 99000 SPECIMEN HANDLING OFFICE-LAB: CPT

## 2023-10-10 PROCEDURE — 80053 COMPREHEN METABOLIC PANEL: CPT

## 2023-10-10 PROCEDURE — 82607 VITAMIN B-12: CPT

## 2023-10-10 PROCEDURE — 84480 ASSAY TRIIODOTHYRONINE (T3): CPT

## 2023-10-10 PROCEDURE — 86706 HEP B SURFACE ANTIBODY: CPT

## 2023-10-10 PROCEDURE — 84443 ASSAY THYROID STIM HORMONE: CPT

## 2023-10-10 PROCEDURE — 82728 ASSAY OF FERRITIN: CPT

## 2023-10-10 PROCEDURE — 87340 HEPATITIS B SURFACE AG IA: CPT

## 2023-10-10 PROCEDURE — 99207 PR NO CHARGE NURSE ONLY: CPT

## 2023-10-10 PROCEDURE — 90471 IMMUNIZATION ADMIN: CPT

## 2023-10-10 PROCEDURE — 80061 LIPID PANEL: CPT

## 2023-10-10 PROCEDURE — 36415 COLL VENOUS BLD VENIPUNCTURE: CPT

## 2023-10-10 PROCEDURE — 82746 ASSAY OF FOLIC ACID SERUM: CPT

## 2023-10-10 NOTE — PROGRESS NOTES
Patient presents today needing updated immunization and immunization records prior to starting healthcare clinical rotations. Printed copy of immunization records provided. Dr. Regina Fang MD orders for Hep B titer requested - patient to lab following this visit. Patient also scheduled to return for mantoux read on 10/12.    Prior to immunization administration, verified patients identity using patient s name and date of birth. Please see Immunization Activity for additional information.     Screening Questionnaire for Adult Immunization    Are you sick today?   No   Do you have allergies to medications, food, a vaccine component or latex?   No   Have you ever had a serious reaction after receiving a vaccination?   No   Do you have a long-term health problem with heart, lung, kidney, or metabolic disease (e.g., diabetes), asthma, a blood disorder, no spleen, complement component deficiency, a cochlear implant, or a spinal fluid leak?  Are you on long-term aspirin therapy?   Yes   Do you have cancer, leukemia, HIV/AIDS, or any other immune system problem?   No   Do you have a parent, brother, or sister with an immune system problem?   Yes   In the past 3 months, have you taken medications that affect  your immune system, such as prednisone, other steroids, or anticancer drugs; drugs for the treatment of rheumatoid arthritis, Crohn s disease, or psoriasis; or have you had radiation treatments?   No   Have you had a seizure, or a brain or other nervous system problem?   No   During the past year, have you received a transfusion of blood or blood    products, or been given immune (gamma) globulin or antiviral drug?   No   For women: Are you pregnant or is there a chance you could become       pregnant during the next month?   No   Have you received any vaccinations in the past 4 weeks?   No     Immunization questionnaire was positive for at least one answer.  Notified Dr. Regina Fang MD.    I have reviewed the  following standing orders:   This patient is due and qualifies for the Influenza vaccine.    Click here for Influenza Vaccine Standing Order    I have reviewed the vaccines inclusion and exclusion criteria; No concerns regarding eligibility.     Patient instructed to report any adverse reactions.     Screening performed by Perez Child RN on 10/10/2023 at 2:57 PM.

## 2023-10-11 LAB
ALBUMIN SERPL BCG-MCNC: 4.6 G/DL (ref 3.5–5.2)
ALP SERPL-CCNC: 57 U/L (ref 35–104)
ALT SERPL W P-5'-P-CCNC: 19 U/L (ref 0–50)
ANION GAP SERPL CALCULATED.3IONS-SCNC: 12 MMOL/L (ref 7–15)
AST SERPL W P-5'-P-CCNC: 27 U/L (ref 0–45)
BILIRUB SERPL-MCNC: 0.6 MG/DL
BUN SERPL-MCNC: 11.6 MG/DL (ref 6–20)
CALCIUM SERPL-MCNC: 10 MG/DL (ref 8.6–10)
CHLORIDE SERPL-SCNC: 103 MMOL/L (ref 98–107)
CHOLEST SERPL-MCNC: 150 MG/DL
CREAT SERPL-MCNC: 0.74 MG/DL (ref 0.51–0.95)
DEPRECATED HCO3 PLAS-SCNC: 24 MMOL/L (ref 22–29)
EGFRCR SERPLBLD CKD-EPI 2021: >90 ML/MIN/1.73M2
FERRITIN SERPL-MCNC: 51 NG/ML (ref 6–175)
GLUCOSE SERPL-MCNC: 82 MG/DL (ref 70–99)
HBV SURFACE AB SERPL IA-ACNC: 12.06 M[IU]/ML
HBV SURFACE AB SERPL IA-ACNC: REACTIVE M[IU]/ML
HBV SURFACE AG SERPL QL IA: NONREACTIVE
HDLC SERPL-MCNC: 47 MG/DL
IRON BINDING CAPACITY (ROCHE): 368 UG/DL (ref 240–430)
IRON SATN MFR SERPL: 27 % (ref 15–46)
IRON SERPL-MCNC: 98 UG/DL (ref 37–145)
LDLC SERPL CALC-MCNC: 86 MG/DL
MAGNESIUM SERPL-MCNC: 2 MG/DL (ref 1.7–2.3)
NONHDLC SERPL-MCNC: 103 MG/DL
POTASSIUM SERPL-SCNC: 4.1 MMOL/L (ref 3.4–5.3)
PROT SERPL-MCNC: 7.4 G/DL (ref 6.4–8.3)
SODIUM SERPL-SCNC: 139 MMOL/L (ref 135–145)
T3 SERPL-MCNC: 130 NG/DL (ref 85–202)
T4 FREE SERPL-MCNC: 1.32 NG/DL (ref 0.9–1.7)
TRIGL SERPL-MCNC: 83 MG/DL
TSH SERPL DL<=0.005 MIU/L-ACNC: 2.64 UIU/ML (ref 0.3–4.2)
VIT B12 SERPL-MCNC: 390 PG/ML (ref 232–1245)
VIT D+METAB SERPL-MCNC: 28 NG/ML (ref 20–50)
ZINC SERPL-MCNC: 78.3 UG/DL

## 2023-10-13 ENCOUNTER — ALLIED HEALTH/NURSE VISIT (OUTPATIENT)
Dept: FAMILY MEDICINE | Facility: CLINIC | Age: 28
End: 2023-10-13
Payer: COMMERCIAL

## 2023-10-13 DIAGNOSIS — Z11.1 ENCOUNTER FOR READING OF TUBERCULIN SKIN TEST: Primary | ICD-10-CM

## 2023-10-13 LAB
PPDINDURATION: 0 MM (ref 0–4.99)
PPDREDNESS: NORMAL

## 2023-10-13 PROCEDURE — 99207 PR NO CHARGE NURSE ONLY: CPT

## 2023-10-13 NOTE — PROGRESS NOTES
Patient is here today for a Mantoux (TST) test results.    Did patient return to clinic 48-72 hours from Mantoux (TST) placement: Yes -     Induration Size? 0    Patient needs form signed? Yes - pt supplied.    Patient reports having previously had the BCG Vaccine: No    Does patient need a two step?  Yes - placed order according to standing order or notified LP for need for next TST.  Instructed patient when to return to the clinic.    Placed second order under previous signer.  Lisa ROTH RN  [facility]

## 2023-10-17 ENCOUNTER — TELEPHONE (OUTPATIENT)
Dept: FAMILY MEDICINE | Facility: CLINIC | Age: 28
End: 2023-10-17

## 2023-10-17 ENCOUNTER — ALLIED HEALTH/NURSE VISIT (OUTPATIENT)
Dept: FAMILY MEDICINE | Facility: CLINIC | Age: 28
End: 2023-10-17
Payer: COMMERCIAL

## 2023-10-17 DIAGNOSIS — Z11.1 VISIT FOR MANTOUX TEST: Primary | ICD-10-CM

## 2023-10-17 DIAGNOSIS — Z01.84 IMMUNITY STATUS TESTING: Primary | ICD-10-CM

## 2023-10-17 PROCEDURE — 99207 PR NO CHARGE NURSE ONLY: CPT

## 2023-10-17 PROCEDURE — 86580 TB INTRADERMAL TEST: CPT

## 2023-10-17 NOTE — TELEPHONE ENCOUNTER
DOD's -- Patient trying to mantoux's and immunizations for school. No record of ever receiving varicella and school requires it. Would you recommend a titer first or immunization? Will need orders either way. Patient will be back on 10/20 for mantoux read and would like to complete this then.     Thanks,   Lelia Dubois RN  [facility]

## 2023-10-17 NOTE — PROGRESS NOTES
Patient is here today for a Mantoux (TST) test placement.    Is there a current order in the chart? Yes    Reason for Mantoux (TST) in patient's own words: For school    Patient needs form signed? Yes- completed per clinic's forms process.    Instructed patient to wait for 15 minutes post injection and to report any reactions immediately to staff.    Told patient to return to clinic in 48-72 hours to have Mantoux (TST) read.     Lelia Dubois RN  [facility]

## 2023-10-20 ENCOUNTER — ALLIED HEALTH/NURSE VISIT (OUTPATIENT)
Dept: FAMILY MEDICINE | Facility: CLINIC | Age: 28
End: 2023-10-20
Payer: COMMERCIAL

## 2023-10-20 ENCOUNTER — LAB (OUTPATIENT)
Dept: LAB | Facility: CLINIC | Age: 28
End: 2023-10-20
Payer: COMMERCIAL

## 2023-10-20 DIAGNOSIS — Z11.1 ENCOUNTER FOR READING OF TUBERCULIN SKIN TEST: Primary | ICD-10-CM

## 2023-10-20 DIAGNOSIS — Z01.84 IMMUNITY STATUS TESTING: ICD-10-CM

## 2023-10-20 LAB
PPDINDURATION: 0 MM (ref 0–4.99)
PPDREDNESS: NORMAL

## 2023-10-20 PROCEDURE — 36415 COLL VENOUS BLD VENIPUNCTURE: CPT

## 2023-10-20 PROCEDURE — 99207 PR NO CHARGE NURSE ONLY: CPT

## 2023-10-20 PROCEDURE — 86787 VARICELLA-ZOSTER ANTIBODY: CPT

## 2023-10-20 NOTE — PROGRESS NOTES
Patient is here today for a Mantoux (TST) test results.    Did patient return to clinic 48-72 hours from Mantoux (TST) placement: Yes -     PPD Induration   Date Value Ref Range Status   10/13/2023 0 0 - 4.99 mm Final     PPD Redness   Date Value Ref Range Status   10/13/2023 Not Present  Final       Induration Size? 0    Patient needs form signed?  Form signed.    Patient reports having previously had the BCG Vaccine: No    Does patient need a two step? Second step completed    Immunization record printed.  Pt was scheduled and brought to lab; varicella titer order in place.      Lisa ROTH RN  [facility]

## 2023-10-23 LAB
VZV IGG SER QL IA: 1263 INDEX
VZV IGG SER QL IA: POSITIVE

## 2023-10-24 ENCOUNTER — OFFICE VISIT (OUTPATIENT)
Dept: INTERNAL MEDICINE | Facility: CLINIC | Age: 28
End: 2023-10-24
Payer: COMMERCIAL

## 2023-10-24 VITALS
WEIGHT: 201.2 LBS | HEIGHT: 65 IN | BODY MASS INDEX: 33.52 KG/M2 | OXYGEN SATURATION: 97 % | HEART RATE: 74 BPM | DIASTOLIC BLOOD PRESSURE: 64 MMHG | SYSTOLIC BLOOD PRESSURE: 100 MMHG

## 2023-10-24 DIAGNOSIS — H91.90 HEARING LOSS, UNSPECIFIED HEARING LOSS TYPE, UNSPECIFIED LATERALITY: ICD-10-CM

## 2023-10-24 DIAGNOSIS — Z12.4 SCREENING FOR CERVICAL CANCER: Primary | ICD-10-CM

## 2023-10-24 DIAGNOSIS — Z12.83 SCREENING EXAM FOR SKIN CANCER: ICD-10-CM

## 2023-10-24 DIAGNOSIS — J45.990 EXERCISE-INDUCED ASTHMA: ICD-10-CM

## 2023-10-24 PROCEDURE — 99395 PREV VISIT EST AGE 18-39: CPT | Performed by: NURSE PRACTITIONER

## 2023-10-24 PROCEDURE — G0145 SCR C/V CYTO,THINLAYER,RESCR: HCPCS | Performed by: NURSE PRACTITIONER

## 2023-10-24 RX ORDER — ALBUTEROL SULFATE 90 UG/1
2 AEROSOL, METERED RESPIRATORY (INHALATION) EVERY 4 HOURS PRN
Qty: 17 G | Refills: 3 | Status: SHIPPED | OUTPATIENT
Start: 2023-10-24

## 2023-10-24 RX ORDER — MOMETASONE FUROATE 50 UG/1
1 AEROSOL RESPIRATORY (INHALATION) DAILY
Qty: 13 G | Refills: 4 | Status: SHIPPED | OUTPATIENT
Start: 2023-10-24 | End: 2024-01-02

## 2023-10-24 ASSESSMENT — ANXIETY QUESTIONNAIRES
1. FEELING NERVOUS, ANXIOUS, OR ON EDGE: MORE THAN HALF THE DAYS
5. BEING SO RESTLESS THAT IT IS HARD TO SIT STILL: NOT AT ALL
3. WORRYING TOO MUCH ABOUT DIFFERENT THINGS: SEVERAL DAYS
GAD7 TOTAL SCORE: 5
6. BECOMING EASILY ANNOYED OR IRRITABLE: SEVERAL DAYS
2. NOT BEING ABLE TO STOP OR CONTROL WORRYING: NOT AT ALL
7. FEELING AFRAID AS IF SOMETHING AWFUL MIGHT HAPPEN: SEVERAL DAYS
GAD7 TOTAL SCORE: 5
IF YOU CHECKED OFF ANY PROBLEMS ON THIS QUESTIONNAIRE, HOW DIFFICULT HAVE THESE PROBLEMS MADE IT FOR YOU TO DO YOUR WORK, TAKE CARE OF THINGS AT HOME, OR GET ALONG WITH OTHER PEOPLE: SOMEWHAT DIFFICULT

## 2023-10-24 ASSESSMENT — ASTHMA QUESTIONNAIRES
QUESTION_3 LAST FOUR WEEKS HOW OFTEN DID YOUR ASTHMA SYMPTOMS (WHEEZING, COUGHING, SHORTNESS OF BREATH, CHEST TIGHTNESS OR PAIN) WAKE YOU UP AT NIGHT OR EARLIER THAN USUAL IN THE MORNING: ONCE OR TWICE
QUESTION_5 LAST FOUR WEEKS HOW WOULD YOU RATE YOUR ASTHMA CONTROL: WELL CONTROLLED
QUESTION_4 LAST FOUR WEEKS HOW OFTEN HAVE YOU USED YOUR RESCUE INHALER OR NEBULIZER MEDICATION (SUCH AS ALBUTEROL): ONCE A WEEK OR LESS
ACT_TOTALSCORE: 21
QUESTION_1 LAST FOUR WEEKS HOW MUCH OF THE TIME DID YOUR ASTHMA KEEP YOU FROM GETTING AS MUCH DONE AT WORK, SCHOOL OR AT HOME: NONE OF THE TIME
QUESTION_2 LAST FOUR WEEKS HOW OFTEN HAVE YOU HAD SHORTNESS OF BREATH: ONCE OR TWICE A WEEK
ACT_TOTALSCORE: 21

## 2023-10-24 ASSESSMENT — PATIENT HEALTH QUESTIONNAIRE - PHQ9
5. POOR APPETITE OR OVEREATING: NOT AT ALL
SUM OF ALL RESPONSES TO PHQ QUESTIONS 1-9: 8

## 2023-10-24 NOTE — PROGRESS NOTES
Ame is a 28 year old that presents in clinic today for the following:     Chief Complaint   Patient presents with    Physical     Inhaler refill            10/24/2023     9:18 AM   Additional Questions   Roomed by Elizabeth López       Screenings from encounters over the past 10 days    No data recorded       Elizabeth López MA at 9:24 AM on 10/24/2023

## 2023-10-25 ENCOUNTER — TELEPHONE (OUTPATIENT)
Dept: INTERNAL MEDICINE | Facility: CLINIC | Age: 28
End: 2023-10-25

## 2023-10-25 DIAGNOSIS — J45.990 EXERCISE-INDUCED ASTHMA: Primary | ICD-10-CM
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
## 2023-10-25 NOTE — TELEPHONE ENCOUNTER
Prior Authorization Retail Medication Request    Medication/Dose: mometasone (ASMANEX HFA) 50 MCG/ACT inhaler  ICD code (if different than what is on RX):    Previously Tried and Failed:    Rationale:      Insurance Name:    Insurance ID:        Pharmacy Information (if different than what is on RX)  Name:    Phone:

## 2023-10-27 NOTE — TELEPHONE ENCOUNTER
PA Initiation    Medication: ASMANEX HFA 50 MCG/ACT IN AERO  Insurance Company: [employer] Non-Specialty PA's - Phone [phone] Fax [phone]  Pharmacy Filling the Rx: [id number] - [place], [place] - [address] AT [facility] [address]  Filling Pharmacy Phone: [phone]  Filling Pharmacy Fax:    Start Date: 10/27/2023

## 2023-10-31 NOTE — TELEPHONE ENCOUNTER
PRIOR AUTHORIZATION DENIED    Medication: ASMANEX HFA 50 MCG/ACT IN AERO  Insurance Company: [employer] Non-Specialty PA's - Phone [phone] Fax [phone]  Denial Date: 10/31/2023  Denial Rational: Diagnosis not covered        Appeal Information:   Patient Notified: No

## 2023-10-31 NOTE — TELEPHONE ENCOUNTER
PA of Asmanex inhaler was denied.   The alternatives are flovent budesonide, dulera, pulmicort. Please advise. Thank you.    Rahul-Mi

## 2023-11-04 ENCOUNTER — HEALTH MAINTENANCE LETTER (OUTPATIENT)
Age: 28
End: 2023-11-04

## 2023-11-27 ENCOUNTER — OFFICE VISIT (OUTPATIENT)
Dept: PHYSICAL MEDICINE AND REHAB | Facility: CLINIC | Age: 28
End: 2023-11-27
Attending: NURSE PRACTITIONER
Payer: COMMERCIAL

## 2023-11-27 VITALS
DIASTOLIC BLOOD PRESSURE: 67 MMHG | SYSTOLIC BLOOD PRESSURE: 111 MMHG | HEART RATE: 79 BPM | RESPIRATION RATE: 16 BRPM | OXYGEN SATURATION: 97 %

## 2023-11-27 DIAGNOSIS — H91.90 HEARING LOSS, UNSPECIFIED HEARING LOSS TYPE, UNSPECIFIED LATERALITY: Primary | ICD-10-CM

## 2023-11-27 DIAGNOSIS — G44.329 CHRONIC POST-CONCUSSION HEADACHE: ICD-10-CM

## 2023-11-27 PROCEDURE — 99204 OFFICE O/P NEW MOD 45 MIN: CPT | Performed by: PHYSICAL MEDICINE & REHABILITATION

## 2023-11-27 ASSESSMENT — ANXIETY QUESTIONNAIRES
GAD7 TOTAL SCORE: 6
5. BEING SO RESTLESS THAT IT IS HARD TO SIT STILL: NOT AT ALL
IF YOU CHECKED OFF ANY PROBLEMS ON THIS QUESTIONNAIRE, HOW DIFFICULT HAVE THESE PROBLEMS MADE IT FOR YOU TO DO YOUR WORK, TAKE CARE OF THINGS AT HOME, OR GET ALONG WITH OTHER PEOPLE: SOMEWHAT DIFFICULT
1. FEELING NERVOUS, ANXIOUS, OR ON EDGE: SEVERAL DAYS
4. TROUBLE RELAXING: NOT AT ALL
7. FEELING AFRAID AS IF SOMETHING AWFUL MIGHT HAPPEN: SEVERAL DAYS
GAD7 TOTAL SCORE: 6
3. WORRYING TOO MUCH ABOUT DIFFERENT THINGS: SEVERAL DAYS
2. NOT BEING ABLE TO STOP OR CONTROL WORRYING: SEVERAL DAYS
6. BECOMING EASILY ANNOYED OR IRRITABLE: MORE THAN HALF THE DAYS
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
## 2023-11-27 NOTE — PATIENT INSTRUCTIONS
"    GENERAL ADVICE:  ~ Gradually ease back into your usual activities.   ~ Rest as needed to help your symptoms go away.  - Consider pairing 50 minutes of activity with 10 minutes of rest.  ~ Allow yourself more time for activities.  ~ Write things down.  At home, at work, whenever there is something that you should remember, even it is simple.  SCREENS:  ~ Change settings on your phone and computer using the \"Blue Light Filter\" (Night Shift on all  [employer] products)  ~ The goal is making screens more yellow and less blue.    ~ If this is not an option you can download this program, [employer], to adjust your screen resolution.  ~ [employer] for various filter and font apps  ~ Turn screen brightness down  ~ Increase font size  ~ Limit screen activities including computer, TV and phones.  NECK PAIN:  ~ Ice or Heat are good~  ~ Massage is ok if it doesn't trigger more symptoms~  ~ Gentle stretches and range-of-motion are helpful.  DIZZINESS:  ~ No driving when dizzy.  ~ No biking, climbing heights or ladders if dizzy.  FATIGUE:  ~ Daily exercise is strongly encouraged.  Start with a 10 min walk and increase the time as tolerated until you are walking 30 minutes per day.    ~ Focus on Good sleep hygiene instead of napping . Your goal should be 8 hours of sleep every night.  ~ Try Melatonin 1 hour before bed  ANXIETY OR MOOD SWINGS:  ~ If you are irritable or anxious, take a break in a quiet room.  ~ Try using the free Calm cory for guided breathing and mindfulness/meditation.  ~ Explore [employer] (https://www.headspace.com) for free and easy-to-use meditation guidance.  LIGHT SENSITIVITIES:  ~ Avoid florescent lighting when possible.  ~Yellow or hanny tinted lenses may help reduce computer or night-time road glare.             ~ Amazon has a $10.00 option: [employer] yellow Night Vision.  NOISE SENSITIVITIES:  ~ Try listening to calming sounds such as the \"Calm Cory\" to help shift your focus off of more irritating " sounds.  ~ Avoid crowded areas at first then slowly introduce yourself to small increments of crowded, noisy areas.  ~ Try High fidelity earplugs used by [employer]. Etymotic ETY-Plugs, can be found on Amazon for $13.00.  DIET:  - In principle incorporate more protein, lots of veggies, some fruit, whole grains.    - Less sweets and saturated fat.   - Mediterranean Diet is an easy-to-follow example.  ~ Drink plenty of water throughout the day (8-10 glasses per day)    PM[facility]R / [facility]   Phone # [phone]  Fax # [phone]        Thank you for allowing us to be a part of your care.

## 2023-12-17 ENCOUNTER — OFFICE VISIT (OUTPATIENT)
Dept: URGENT CARE | Facility: URGENT CARE | Age: 28
End: 2023-12-17
Payer: COMMERCIAL

## 2023-12-17 ENCOUNTER — NURSE TRIAGE (OUTPATIENT)
Dept: NURSING | Facility: CLINIC | Age: 28
End: 2023-12-17

## 2023-12-17 VITALS
OXYGEN SATURATION: 97 % | DIASTOLIC BLOOD PRESSURE: 84 MMHG | SYSTOLIC BLOOD PRESSURE: 116 MMHG | RESPIRATION RATE: 16 BRPM | TEMPERATURE: 97.3 F | HEART RATE: 88 BPM

## 2023-12-17 DIAGNOSIS — J45.31 MILD PERSISTENT ASTHMA WITH (ACUTE) EXACERBATION: Primary | ICD-10-CM

## 2023-12-17 DIAGNOSIS — B37.31 CANDIDIASIS OF VAGINA: ICD-10-CM

## 2023-12-17 LAB
FLUAV AG SPEC QL IA: NEGATIVE
FLUBV AG SPEC QL IA: NEGATIVE

## 2023-12-17 PROCEDURE — 96375 TX/PRO/DX INJ NEW DRUG ADDON: CPT

## 2023-12-17 PROCEDURE — 87635 SARS-COV-2 COVID-19 AMP PRB: CPT | Performed by: INTERNAL MEDICINE

## 2023-12-17 PROCEDURE — 96361 HYDRATE IV INFUSION ADD-ON: CPT

## 2023-12-17 PROCEDURE — 99213 OFFICE O/P EST LOW 20 MIN: CPT | Performed by: INTERNAL MEDICINE

## 2023-12-17 PROCEDURE — 96374 THER/PROPH/DIAG INJ IV PUSH: CPT

## 2023-12-17 PROCEDURE — 99284 EMERGENCY DEPT VISIT MOD MDM: CPT | Mod: 25

## 2023-12-17 PROCEDURE — 87804 INFLUENZA ASSAY W/OPTIC: CPT | Performed by: INTERNAL MEDICINE

## 2023-12-17 RX ORDER — PREDNISONE 20 MG/1
40 TABLET ORAL DAILY
Qty: 10 TABLET | Refills: 0 | Status: SHIPPED | OUTPATIENT
Start: 2023-12-17 | End: 2023-12-22

## 2023-12-17 RX ORDER — FLUCONAZOLE 150 MG/1
150 TABLET ORAL
Qty: 3 TABLET | Refills: 0 | Status: SHIPPED | OUTPATIENT
Start: 2023-12-17 | End: 2023-12-24

## 2023-12-17 ASSESSMENT — PAIN SCALES - GENERAL: PAINLEVEL: SEVERE PAIN (7)

## 2023-12-17 NOTE — PROGRESS NOTES
Assessment & Plan     Mild persistent asthma with (acute) exacerbation  - Influenza A & B Antigen - Clinic Collect  - Symptomatic COVID-19 Virus (Coronavirus) by PCR Nose  - predniSONE (DELTASONE) 20 MG tablet; Take 2 tablets (40 mg) by mouth daily for 5 days    Candidiasis of vagina  - fluconazole (DIFLUCAN) 150 MG tablet; Take 1 tablet (150 mg) by mouth every 3 days for 3 doses    Pelon Trotter MD  [facility]    Shari Mccloud is a 28 year old, presenting for the following health issues:  Cough (Cough )    HPI   Chief complaint of cough.  Has a history of asthma and was on Asmanex which she stopped due to some oropharyngeal side effects.  Had several days of fever earlier in the week.  More sputum production know and some blood -tinged sputum.  Using Mucinex. Feeling tight in the chest and some shortness of breath. She is using albuterol with some benefit.      Review of Systems   Constitutional, HEENT, cardiovascular, pulmonary, gi and gu systems are negative, except as otherwise noted.      Objective    /84 (BP Location: Right arm, Patient Position: Sitting, Cuff Size: Adult Regular)   Pulse 88   Temp 97.3  F (36.3  C) (Temporal)   Resp 16   LMP 10/23/2023 (Exact Date)   SpO2 97%   There is no height or weight on file to calculate BMI.  Physical Exam   GENERAL APPEARANCE: healthy, alert, and no distress  HENT: ear canals and TM's normal and cobblestoning of the posterior pharynx with post-nasal drainage   NECK: no adenopathy and no asymmetry, masses, or scars  RESP: scattered end expiratory wheeze; comfortable respiratory mechanics; speaking full sentences without difficulty  CV: regular rates and rhythm, normal S1 S2, no S3 or S4, and no murmur, click or rub    Results for orders placed or performed in visit on 12/17/23 (from the past 24 hour(s))   Influenza A & B Antigen - Clinic Collect    Specimen: Nose; Swab   Result Value Ref Range    Influenza A  antigen Negative Negative    Influenza B antigen Negative Negative    Narrative    Test results must be correlated with clinical data. If necessary, results should be confirmed by a molecular assay or viral culture.

## 2023-12-18 ENCOUNTER — HOSPITAL ENCOUNTER (EMERGENCY)
Facility: CLINIC | Age: 28
Discharge: HOME | End: 2023-12-18
Attending: EMERGENCY MEDICINE | Admitting: EMERGENCY MEDICINE
Payer: COMMERCIAL

## 2023-12-18 VITALS
WEIGHT: 190 LBS | SYSTOLIC BLOOD PRESSURE: 125 MMHG | TEMPERATURE: 97.8 F | HEIGHT: 65 IN | BODY MASS INDEX: 31.65 KG/M2 | RESPIRATION RATE: 18 BRPM | HEART RATE: 94 BPM | DIASTOLIC BLOOD PRESSURE: 75 MMHG | OXYGEN SATURATION: 97 %

## 2023-12-18 DIAGNOSIS — R11.2 NAUSEA AND VOMITING, UNSPECIFIED VOMITING TYPE: ICD-10-CM

## 2023-12-18 DIAGNOSIS — J20.5 ACUTE BRONCHITIS DUE TO RESPIRATORY SYNCYTIAL VIRUS (RSV): ICD-10-CM

## 2023-12-18 LAB
ALBUMIN SERPL BCG-MCNC: 4.7 G/DL (ref 3.5–5.2)
ALP SERPL-CCNC: 77 U/L (ref 40–150)
ALT SERPL W P-5'-P-CCNC: 27 U/L (ref 0–50)
ANION GAP SERPL CALCULATED.3IONS-SCNC: 12 MMOL/L (ref 7–15)
AST SERPL W P-5'-P-CCNC: 25 U/L (ref 0–45)
BASOPHILS # BLD AUTO: 0 10E3/UL (ref 0–0.2)
BASOPHILS NFR BLD AUTO: 0 %
BILIRUB SERPL-MCNC: 0.9 MG/DL
BUN SERPL-MCNC: 11.4 MG/DL (ref 6–20)
CALCIUM SERPL-MCNC: 9.3 MG/DL (ref 8.6–10)
CHLORIDE SERPL-SCNC: 100 MMOL/L (ref 98–107)
CREAT SERPL-MCNC: 0.83 MG/DL (ref 0.51–0.95)
DEPRECATED HCO3 PLAS-SCNC: 26 MMOL/L (ref 22–29)
EGFRCR SERPLBLD CKD-EPI 2021: >90 ML/MIN/1.73M2
EOSINOPHIL # BLD AUTO: 0 10E3/UL (ref 0–0.7)
EOSINOPHIL NFR BLD AUTO: 0 %
ERYTHROCYTE [DISTWIDTH] IN BLOOD BY AUTOMATED COUNT: 12.4 % (ref 10–15)
FLUAV RNA SPEC QL NAA+PROBE: NEGATIVE
FLUBV RNA RESP QL NAA+PROBE: NEGATIVE
GLUCOSE SERPL-MCNC: 112 MG/DL (ref 70–99)
HCT VFR BLD AUTO: 44.4 % (ref 35–47)
HGB BLD-MCNC: 14.9 G/DL (ref 11.7–15.7)
IMM GRANULOCYTES # BLD: 0 10E3/UL
IMM GRANULOCYTES NFR BLD: 0 %
LIPASE SERPL-CCNC: 16 U/L (ref 13–60)
LYMPHOCYTES # BLD AUTO: 0.5 10E3/UL (ref 0.8–5.3)
LYMPHOCYTES NFR BLD AUTO: 4 %
MCH RBC QN AUTO: 28.1 PG (ref 26.5–33)
MCHC RBC AUTO-ENTMCNC: 33.6 G/DL (ref 31.5–36.5)
MCV RBC AUTO: 84 FL (ref 78–100)
MONOCYTES # BLD AUTO: 0.3 10E3/UL (ref 0–1.3)
MONOCYTES NFR BLD AUTO: 3 %
NEUTROPHILS # BLD AUTO: 11 10E3/UL (ref 1.6–8.3)
NEUTROPHILS NFR BLD AUTO: 93 %
NRBC # BLD AUTO: 0 10E3/UL
NRBC BLD AUTO-RTO: 0 /100
PLATELET # BLD AUTO: 353 10E3/UL (ref 150–450)
POTASSIUM SERPL-SCNC: 4 MMOL/L (ref 3.4–5.3)
PROT SERPL-MCNC: 8 G/DL (ref 6.4–8.3)
RBC # BLD AUTO: 5.31 10E6/UL (ref 3.8–5.2)
RSV RNA SPEC NAA+PROBE: POSITIVE
SARS-COV-2 RNA RESP QL NAA+PROBE: NEGATIVE
SODIUM SERPL-SCNC: 138 MMOL/L (ref 135–145)
WBC # BLD AUTO: 12 10E3/UL (ref 4–11)

## 2023-12-18 PROCEDURE — C9113 INJ PANTOPRAZOLE SODIUM, VIA: HCPCS | Performed by: EMERGENCY MEDICINE

## 2023-12-18 PROCEDURE — 87637 SARSCOV2&INF A&B&RSV AMP PRB: CPT | Performed by: EMERGENCY MEDICINE

## 2023-12-18 PROCEDURE — 258N000003 HC RX IP 258 OP 636: Performed by: EMERGENCY MEDICINE

## 2023-12-18 PROCEDURE — 80053 COMPREHEN METABOLIC PANEL: CPT | Performed by: EMERGENCY MEDICINE

## 2023-12-18 PROCEDURE — 250N000011 HC RX IP 250 OP 636: Performed by: EMERGENCY MEDICINE

## 2023-12-18 PROCEDURE — 85025 COMPLETE CBC W/AUTO DIFF WBC: CPT | Performed by: EMERGENCY MEDICINE

## 2023-12-18 PROCEDURE — 83690 ASSAY OF LIPASE: CPT | Performed by: EMERGENCY MEDICINE

## 2023-12-18 PROCEDURE — 36415 COLL VENOUS BLD VENIPUNCTURE: CPT | Performed by: EMERGENCY MEDICINE

## 2023-12-18 RX ORDER — ONDANSETRON 4 MG/1
4 TABLET, ORALLY DISINTEGRATING ORAL ONCE
Status: COMPLETED | OUTPATIENT
Start: 2023-12-18 | End: 2023-12-18

## 2023-12-18 RX ORDER — ONDANSETRON 2 MG/ML
4 INJECTION INTRAMUSCULAR; INTRAVENOUS ONCE
Status: COMPLETED | OUTPATIENT
Start: 2023-12-18 | End: 2023-12-18

## 2023-12-18 RX ORDER — PROCHLORPERAZINE MALEATE 10 MG
10 TABLET ORAL EVERY 8 HOURS PRN
Qty: 12 TABLET | Refills: 0 | Status: SHIPPED | OUTPATIENT
Start: 2023-12-18 | End: 2024-01-02

## 2023-12-18 RX ADMIN — ONDANSETRON 4 MG: 4 TABLET, ORALLY DISINTEGRATING ORAL at 00:11

## 2023-12-18 RX ADMIN — ONDANSETRON 4 MG: 2 INJECTION INTRAMUSCULAR; INTRAVENOUS at 04:03

## 2023-12-18 RX ADMIN — PROCHLORPERAZINE EDISYLATE 5 MG: 5 INJECTION INTRAMUSCULAR; INTRAVENOUS at 04:37

## 2023-12-18 RX ADMIN — PANTOPRAZOLE SODIUM 40 MG: 40 INJECTION, POWDER, FOR SOLUTION INTRAVENOUS at 04:03

## 2023-12-18 RX ADMIN — SODIUM CHLORIDE 1000 ML: 9 INJECTION, SOLUTION INTRAVENOUS at 04:02

## 2023-12-18 ASSESSMENT — ACTIVITIES OF DAILY LIVING (ADL)
ADLS_ACUITY_SCORE: 35
ADLS_ACUITY_SCORE: 35

## 2023-12-18 NOTE — ED PROVIDER NOTES
History     Chief Complaint:  Vomiting (Post taking prednisone with ibuprophen)       TEJA Ayala is a 28 year old female who presents with concern of nausea and vomiting.  The patient's had a cough for a few days.  She went to urgent care and had influenza antigen testing that was negative.  She did not take any COVID test at home.  She had a COVID test at the urgent care that still pending.  She has a history of asthma.  She was prescribed prednisone and is using her albuterol inhaler.  She reports taking ibuprofen with the first dose of prednisone on Sunday.  She had vomiting after that.  She reports a few loose stools as well.  She also wonders if there is some specks of blood in her emesis after she had 20-30 episodes of vomiting.  Her emesis at the beginning was just her food that she been eating.  She has been unable to keep anything down.  She received Zofran ODT in triage and did have emesis 30 minutes after that.  She does report slight abdominal discomfort more on the left side.  No urinary symptoms or chance of pregnancy.  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      Independent Historian:   Patient's partner, staff, is at bedside and confirms the above information.    Review of External Notes:   12/17/23 Urgent care note from [facility] where patient had influenza antigen testing that was negative and COVID testing that is still pending.      Medications:    prochlorperazine (COMPAZINE) 10 MG tablet  albuterol (PROAIR HFA) 108 (90 Base) MCG/ACT inhaler  busPIRone (BUSPAR) 15 MG tablet  fluconazole (DIFLUCAN) 150 MG tablet  lamoTRIgine (LAMICTAL) 25 MG tablet  mometasone (ASMANEX HFA) 50 MCG/ACT inhaler  mometasone (ASMANEX TWISTHALER) 220 MCG/ACT inhaler  predniSONE (DELTASONE) 20 MG tablet        Past Medical History:    Past Medical History:   Diagnosis Date    Anxiety     Depression     Eating disorder     PTSD (post-traumatic stress  "disorder)     Substance abuse in remission (H)        Past Surgical History:    Past Surgical History:   Procedure Laterality Date    EXCISE MASS LOWER EXTREMITY Left 6/1/2023    Procedure: Excision left thigh mass;  Surgeon: Teo Howard MD;  [place] [facility] [place]    RETINAL LASER PROCEDURE Left         Physical Exam   Patient Vitals for the past 24 hrs:   BP Temp Pulse Resp SpO2 Height Weight   12/18/23 0417 -- -- -- -- 97 % -- --   12/18/23 0407 -- -- -- -- 98 % -- --   12/18/23 0406 125/75 -- 94 -- -- -- --   12/18/23 0258 -- -- -- -- 96 % -- --   12/18/23 0256 -- -- -- -- 96 % -- --   12/18/23 0255 132/82 -- 104 -- -- -- --   12/18/23 0004 (!) 146/94 97.8  F (36.6  C) 100 18 98 % 1.651 m (5' 5\") 86.2 kg (190 lb)        Physical Exam  General:  Sitting on bed.  HENT:  No obvious trauma to head  Right Ear:  External ear normal.   Left Ear:  External ear normal.   Nose:  Nose normal.   Eyes:  Conjunctivae and EOM are normal. Pupils are equal, round, and reactive.   Neck: Normal range of motion. Neck supple. No tracheal deviation present.   CV:  Normal heart sounds. No murmur heard.  Pulm/Chest: Effort normal and breath sounds normal.   Abd: Soft. No distension. There is slight left upper and left lower quadrant tenderness.  Negative Allen's and negative McBurney's.  There is no rigidity, no rebound and no guarding.   M/S: Normal range of motion.   Neuro: Awake and alert.   Skin: Skin is warm and dry. No rash noted. Not diaphoretic.   Psych: Normal mood and affect. Behavior is normal.     Emergency Department Course   Imaging:  No orders to display          Laboratory:  Labs Ordered and Resulted from Time of ED Arrival to Time of ED Departure   INFLUENZA A/B, RSV, & SARS-COV2 PCR - Abnormal       Result Value    Influenza A PCR Negative      Influenza B PCR Negative      RSV PCR Positive (*)     SARS CoV2 PCR Negative     COMPREHENSIVE METABOLIC PANEL - Abnormal    Sodium 138      Potassium 4.0      " Carbon Dioxide (CO2) 26      Anion Gap 12      Urea Nitrogen 11.4      Creatinine 0.83      GFR Estimate >90      Calcium 9.3      Chloride 100      Glucose 112 (*)     Alkaline Phosphatase 77      AST 25      ALT 27      Protein Total 8.0      Albumin 4.7      Bilirubin Total 0.9     CBC WITH PLATELETS AND DIFFERENTIAL - Abnormal    WBC Count 12.0 (*)     RBC Count 5.31 (*)     Hemoglobin 14.9      Hematocrit 44.4      MCV 84      MCH 28.1      MCHC 33.6      RDW 12.4      Platelet Count 353      % Neutrophils 93      % Lymphocytes 4      % Monocytes 3      % Eosinophils 0      % Basophils 0      % Immature Granulocytes 0      NRBCs per 100 WBC 0      Absolute Neutrophils 11.0 (*)     Absolute Lymphocytes 0.5 (*)     Absolute Monocytes 0.3      Absolute Eosinophils 0.0      Absolute Basophils 0.0      Absolute Immature Granulocytes 0.0      Absolute NRBCs 0.0     LIPASE - Normal    Lipase 16          Emergency Department Course & Assessments:    Interventions:  Medications   ondansetron (ZOFRAN ODT) ODT tab 4 mg (4 mg Oral $Given 12/18/23 0011)   sodium chloride 0.9% BOLUS 1,000 mL (0 mLs Intravenous Stopped 12/18/23 0438)   ondansetron (ZOFRAN) injection 4 mg (4 mg Intravenous $Given 12/18/23 0403)   pantoprazole (PROTONIX) IV push injection 40 mg (40 mg Intravenous $Given 12/18/23 0403)   prochlorperazine (COMPAZINE) injection 5 mg (5 mg Intravenous $Given 12/18/23 0437)        Assessments:  0340 I evaluated the patient, obtained the above history and performed the physical exam.  0430 patient still nauseous and vomiting after the IV Zofran so Compazine is ordered.  0515 I reevaluated the patient.  She is feeling significantly better.  She feels safe and desires to go home.  Reviewed results with her.  I do not believe a chest x-ray or advanced imaging of the abdomen or pelvis is necessary at this time and I reviewed this with her and she is in agreement.    Independent Interpretation (X-rays, CTs, rhythm  strip):  None    Consultations/Discussion of Management or Tests:  None        Social Determinants of Health affecting care:   None    Disposition:  The patient was discharged to home.     Impression & Plan    Medical Decision Making:  Ame Ayala is a very pleasant 28 year old year old patient who presents to the emergency department with concern of nausea and vomiting.  She has had some cold symptoms.  She was started on prednisone.  She took ibuprofen with it.  She had emesis after that.  She reports throwing up numerous times and thought she may have seen some blood in the emesis at the end.  Patient is hemodynamically stable.  No signs of anemia.  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  They are sick.  COVID, RSV and influenza testing performed and RSV is positive.  Patient does have a trace leukocytosis which is likely reactive from vomiting.  Oral and IV Zofran was not helpful, but she felt significantly better after the fluids and IV Compazine.  Recommended continued symptomatic care.  Discussed reasons to return.  No abdominal pain or tenderness in the right lower quadrant to suggest appendicitis.  None of the right upper quadrant to suggest cholecystitis.  Liver enzymes and lipase are normal so doubt pancreatitis or cholelithiasis/biliary colic.  Patient feeling much better and is agreement with close follow-up with PCP and discussed reasons to return.    The treatment plan was discussed with the patient and they expressed understanding of this plan and consented to the plan.  In addition, the patient will return to the emergency department if their symptoms persist, worsen, if new symptoms arise or if there is any concern as other pathology may be present that is not evident at this time. They also understand the importance of close follow up in the clinic and if unable to do so will return to the emergency department for a reevaluation. All questions were answered.    Diagnosis:    ICD-10-CM    1. Acute  bronchitis due to respiratory syncytial virus (RSV)  J20.5       2. Nausea and vomiting, unspecified vomiting type  R11.2            Discharge Medications:  New Prescriptions    PROCHLORPERAZINE (COMPAZINE) 10 MG TABLET    Take 1 tablet (10 mg) by mouth every 8 hours as needed for nausea or vomiting     12/18/2023   Juan Dsouza, Juan Ruiz DO  12/18/23 0563

## 2023-12-18 NOTE — ED TRIAGE NOTES
Pt states she went to urgent care yesterday morning and was diagnosed with bronchitis. Pt was given prednisone and she took prednisone with ibuprophen. Pt states she has been throwing up for the past 1.5 hours.

## 2023-12-18 NOTE — TELEPHONE ENCOUNTER
"[facility] today. Coughing, I was DX with bronchitis, I have asthma. Prednisone prescribed--I took it 2 hrs ago with Ibuprofen. I can't stop vomiting x the last 45 min.  Chest pain and shortness of breath. Pain located in the front and center, right side deeper down when coughing. Pain currently=\"2\" sitting, coughing =\"5\".   Abdominal pain in the center, just under the ribcage. Currently pain=\"2-3\" x 2 hrs.     Triaged to a disposition of See HCP within 4 hrs or PCP triage. Patient states she will go to the ER now.     Anabelle Lua RN Triage Nurse Advisor 11:22 PM 12/17/2023  Reason for Disposition   [1] Constant abdominal pain AND [2] present > 2 hours    Additional Information   Negative: Shock suspected (e.g., cold/pale/clammy skin, too weak to stand, low BP, rapid pulse)   Negative: Difficult to awaken or acting confused (e.g., disoriented, slurred speech)   Negative: Sounds like a life-threatening emergency to the triager   Negative: Vomiting occurs only while coughing   Negative: [1] Pregnant < 20 Weeks AND [2] nausea/vomiting began in early pregnancy (i.e., 4-8 weeks pregnant)   Negative: Chest pain   Negative: Headache is main symptom   Negative: Vomiting (or Nausea) in a cancer patient who is currently (or recently) receiving chemotherapy or radiation therapy, or cancer patient who has metastatic or end-stage cancer and is receiving palliative care   Negative: [1] Vomiting AND [2] contains red blood or black (\"coffee ground\") material  (Exception: Few red streaks in vomit that only happened once.)   Negative: Severe pain in one eye   Negative: Recent head injury (within last 3 days)   Negative: Recent abdominal injury (within last 3 days)   Negative: [1] Insulin-dependent diabetes (Type I) AND [2] glucose > 400 mg/dl (22 mmol/l)   Negative: [1] Vomiting AND [2] hernia is more painful or swollen than usual   Negative: [1] SEVERE vomiting (e.g., 6 or more times/day) AND [2] present > 8 hours " (Exception: Patient sounds well, is drinking liquids, does not sound dehydrated, and vomiting has lasted less than 24 hours.)   Negative: [1] MODERATE vomiting (e.g., 3 - 5 times/day) AND [2] age > 60 years   Negative: Severe headache (e.g., excruciating)  (Exception: Similar to previous migraines.)   Negative: High-risk adult (e.g., diabetes mellitus, brain tumor, V-P shunt, hernia)   Negative: [1] Drinking very little AND [2] dehydration suspected (e.g., no urine > 12 hours, very dry mouth, very lightheaded)   Negative: Patient sounds very sick or weak to the triager   Negative: [1] Vomiting AND [2] abdomen looks much more swollen than usual   Negative: [1] Vomiting AND [2] contains bile (green color)    Protocols used: Vomiting-A-AH

## 2023-12-19 LAB — SARS-COV-2 RNA RESP QL NAA+PROBE: NEGATIVE

## 2024-01-02 ENCOUNTER — OFFICE VISIT (OUTPATIENT)
Dept: INTERNAL MEDICINE | Facility: CLINIC | Age: 29
End: 2024-01-02
Payer: COMMERCIAL

## 2024-01-02 ENCOUNTER — LAB (OUTPATIENT)
Dept: LAB | Facility: CLINIC | Age: 29
End: 2024-01-02
Payer: COMMERCIAL

## 2024-01-02 ENCOUNTER — ANCILLARY PROCEDURE (OUTPATIENT)
Dept: GENERAL RADIOLOGY | Facility: CLINIC | Age: 29
End: 2024-01-02
Attending: NURSE PRACTITIONER
Payer: COMMERCIAL

## 2024-01-02 VITALS
DIASTOLIC BLOOD PRESSURE: 79 MMHG | TEMPERATURE: 98 F | BODY MASS INDEX: 31.62 KG/M2 | HEART RATE: 87 BPM | OXYGEN SATURATION: 96 % | SYSTOLIC BLOOD PRESSURE: 117 MMHG | HEIGHT: 65 IN

## 2024-01-02 DIAGNOSIS — R05.1 ACUTE COUGH: ICD-10-CM

## 2024-01-02 DIAGNOSIS — R05.1 ACUTE COUGH: Primary | ICD-10-CM

## 2024-01-02 DIAGNOSIS — R50.9 COUGH WITH FEVER: ICD-10-CM

## 2024-01-02 DIAGNOSIS — J20.9 ACUTE BRONCHITIS, UNSPECIFIED ORGANISM: Primary | ICD-10-CM

## 2024-01-02 DIAGNOSIS — E63.9 NUTRITIONAL DEFICIENCY: ICD-10-CM

## 2024-01-02 DIAGNOSIS — R05.9 COUGH WITH FEVER: ICD-10-CM

## 2024-01-02 LAB
ALBUMIN SERPL BCG-MCNC: 4.6 G/DL (ref 3.5–5.2)
ALP SERPL-CCNC: 67 U/L (ref 40–150)
ALT SERPL W P-5'-P-CCNC: 43 U/L (ref 0–50)
ANION GAP SERPL CALCULATED.3IONS-SCNC: 10 MMOL/L (ref 7–15)
AST SERPL W P-5'-P-CCNC: 21 U/L (ref 0–45)
BILIRUB SERPL-MCNC: 1.1 MG/DL
BUN SERPL-MCNC: 10.6 MG/DL (ref 6–20)
CALCIUM SERPL-MCNC: 9.7 MG/DL (ref 8.6–10)
CHLORIDE SERPL-SCNC: 104 MMOL/L (ref 98–107)
CHOLEST SERPL-MCNC: 140 MG/DL
CREAT SERPL-MCNC: 0.9 MG/DL (ref 0.51–0.95)
DEPRECATED HCO3 PLAS-SCNC: 26 MMOL/L (ref 22–29)
EGFRCR SERPLBLD CKD-EPI 2021: 89 ML/MIN/1.73M2
ERYTHROCYTE [DISTWIDTH] IN BLOOD BY AUTOMATED COUNT: 12.4 % (ref 10–15)
FASTING STATUS PATIENT QL REPORTED: NORMAL
FERRITIN SERPL-MCNC: 59 NG/ML (ref 6–175)
FLUAV AG SPEC QL IA: NEGATIVE
FLUBV AG SPEC QL IA: NEGATIVE
GLUCOSE SERPL-MCNC: 99 MG/DL (ref 70–99)
HCT VFR BLD AUTO: 44.8 % (ref 35–47)
HDLC SERPL-MCNC: 55 MG/DL
HGB BLD-MCNC: 14.9 G/DL (ref 11.7–15.7)
IRON SERPL-MCNC: 27 UG/DL (ref 37–145)
LDLC SERPL CALC-MCNC: 75 MG/DL
MAGNESIUM SERPL-MCNC: 1.9 MG/DL (ref 1.7–2.3)
MCH RBC QN AUTO: 28 PG (ref 26.5–33)
MCHC RBC AUTO-ENTMCNC: 33.3 G/DL (ref 31.5–36.5)
MCV RBC AUTO: 84 FL (ref 78–100)
NONHDLC SERPL-MCNC: 85 MG/DL
PLATELET # BLD AUTO: 397 10E3/UL (ref 150–450)
POTASSIUM SERPL-SCNC: 4.3 MMOL/L (ref 3.4–5.3)
PROT SERPL-MCNC: 7.5 G/DL (ref 6.4–8.3)
RBC # BLD AUTO: 5.32 10E6/UL (ref 3.8–5.2)
SARS-COV-2 RNA RESP QL NAA+PROBE: NEGATIVE
SODIUM SERPL-SCNC: 140 MMOL/L (ref 135–145)
TRIGL SERPL-MCNC: 48 MG/DL
TSH SERPL DL<=0.005 MIU/L-ACNC: 1.44 UIU/ML (ref 0.3–4.2)
VIT D+METAB SERPL-MCNC: 23 NG/ML (ref 20–50)
WBC # BLD AUTO: 15.3 10E3/UL (ref 4–11)

## 2024-01-02 PROCEDURE — 99000 SPECIMEN HANDLING OFFICE-LAB: CPT | Performed by: PATHOLOGY

## 2024-01-02 PROCEDURE — 71046 X-RAY EXAM CHEST 2 VIEWS: CPT | Mod: GC | Performed by: RADIOLOGY

## 2024-01-02 PROCEDURE — 87804 INFLUENZA ASSAY W/OPTIC: CPT | Performed by: PATHOLOGY

## 2024-01-02 PROCEDURE — 84630 ASSAY OF ZINC: CPT | Mod: 90 | Performed by: PATHOLOGY

## 2024-01-02 PROCEDURE — 87635 SARS-COV-2 COVID-19 AMP PRB: CPT | Performed by: NURSE PRACTITIONER

## 2024-01-02 PROCEDURE — 83735 ASSAY OF MAGNESIUM: CPT | Performed by: PATHOLOGY

## 2024-01-02 PROCEDURE — 82306 VITAMIN D 25 HYDROXY: CPT | Performed by: NURSE PRACTITIONER

## 2024-01-02 PROCEDURE — 84443 ASSAY THYROID STIM HORMONE: CPT | Performed by: PATHOLOGY

## 2024-01-02 PROCEDURE — 83540 ASSAY OF IRON: CPT | Performed by: PATHOLOGY

## 2024-01-02 PROCEDURE — 82728 ASSAY OF FERRITIN: CPT | Performed by: PATHOLOGY

## 2024-01-02 PROCEDURE — 36415 COLL VENOUS BLD VENIPUNCTURE: CPT | Performed by: PATHOLOGY

## 2024-01-02 PROCEDURE — 85027 COMPLETE CBC AUTOMATED: CPT | Performed by: PATHOLOGY

## 2024-01-02 PROCEDURE — 99214 OFFICE O/P EST MOD 30 MIN: CPT | Performed by: NURSE PRACTITIONER

## 2024-01-02 PROCEDURE — 80061 LIPID PANEL: CPT | Performed by: PATHOLOGY

## 2024-01-02 PROCEDURE — 80053 COMPREHEN METABOLIC PANEL: CPT | Performed by: PATHOLOGY

## 2024-01-02 RX ORDER — AZITHROMYCIN 250 MG/1
TABLET, FILM COATED ORAL
Qty: 6 TABLET | Refills: 0 | Status: SHIPPED | OUTPATIENT
Start: 2024-01-02 | End: 2024-01-07

## 2024-01-02 NOTE — PROGRESS NOTES
Ame is a 28 year old that presents in clinic today for the following:     Chief Complaint   Patient presents with    Hospital F/U     RSV onset 12/14, urgent care & ER visit 12/17.  Now sudden chills, fever, chest pain, headache, joint pain with a worse barking cough and mucus.   Sore throat and fatigue           1/2/2024    11:25 AM   Additional Questions   Roomed by [facility]       Screenings from encounters over the past 10 days    No data recorded       Elizabeth López MA at 11:33 AM on 1/2/2024

## 2024-01-03 LAB — ZINC SERPL-MCNC: 78.4 UG/DL
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
## 2024-01-04 NOTE — RESULT ENCOUNTER NOTE
Dear Ame,    Your Zinc level was normal.   Your vitamin D was low normal. Be sure to supplement with Vit D 1000 units a day.   Your iron level is low.  Did you stop iron supplement?  Your thyroid level was fine.   Lipids looked great.   Electrolytes, liver and kidney function looks good.   Magnesium level was fine.     Malinda WILSON, CNP

## 2024-05-23 ENCOUNTER — OFFICE VISIT (OUTPATIENT)
Dept: DERMATOLOGY | Facility: CLINIC | Age: 29
End: 2024-05-23
Payer: COMMERCIAL

## 2024-05-23 DIAGNOSIS — D22.9 MULTIPLE BENIGN NEVI: ICD-10-CM

## 2024-05-23 DIAGNOSIS — L91.8 ACROCHORDON: ICD-10-CM

## 2024-05-23 DIAGNOSIS — L56.4 POLYMORPHOUS LIGHT ERUPTION: Primary | ICD-10-CM

## 2024-05-23 DIAGNOSIS — L81.4 SOLAR LENTIGO: ICD-10-CM

## 2024-05-23 DIAGNOSIS — F42.4 PICKING OWN SKIN: ICD-10-CM

## 2024-05-23 PROCEDURE — 99202 OFFICE O/P NEW SF 15 MIN: CPT | Mod: 25 | Performed by: DERMATOLOGY

## 2024-05-23 PROCEDURE — 11200 RMVL SKIN TAGS UP TO&INC 15: CPT | Mod: GC | Performed by: DERMATOLOGY

## 2024-05-23 RX ORDER — TRIAMCINOLONE ACETONIDE 1 MG/G
CREAM TOPICAL
Qty: 80 G | Refills: 2 | Status: SHIPPED | OUTPATIENT
Start: 2024-05-23 | End: 2024-08-08
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
## 2024-05-23 NOTE — NURSING NOTE
Dermatology Rooming Note    Ame Ayala's goals for this visit include:   Chief Complaint   Patient presents with    Derm Problem     FBSE with concern about rash on upper torso.      Chapin Tyler, EMT-B

## 2024-05-23 NOTE — PATIENT INSTRUCTIONS
We think the rash on your chest could either be a sun rash (polymorphous light eruption) or a contact dermatitis. Either way, the triamcinolone cream should help with this! Apply it twice daily until the rash goes away. Do not use this twice a day for over two weeks, and do not use this on your face, armpits, or groin, or buttocks.    For the acne, try an over the counter salicylic acid wash daily. If this doesn't cut it, you can try an over the counter benzoyl peroxide wash (5% or lower; 10% is fine, but it can be drying)    For the picking, try over the counter n-acetylcysteine. I would take 1,200mg daily and see how this helps over a period of a few months. This is generally well tolerated, rare side effects can include some nausea            Overview  Skin cancer -- the abnormal growth of skin cells -- most often develops on skin exposed to the sun. But this common form of cancer can also occur on areas of your skin not ordinarily exposed to sunlight.    There are three major types of skin cancer -- basal cell carcinoma, squamous cell carcinoma and melanoma.    You can reduce your risk of skin cancer by limiting or avoiding exposure to ultraviolet (UV) radiation. Checking your skin for suspicious changes can help detect skin cancer at its earliest stages. Early detection of skin cancer gives you the greatest chance for successful skin cancer treatment.    Skin cancer develops primarily on areas of sun-exposed skin, including the scalp, face, lips, ears, neck, chest, arms and hands, and on the legs in women. But it can also form on areas that rarely see the light of day -- your palms, beneath your fingernails or toenails, and your genital area.    Skin cancer affects people of all skin tones, including those with darker complexions. When melanoma occurs in people with dark skin tones, it's more likely to occur in areas not normally exposed to the sun, such as the palms of the hands and soles of the feet.    Basal  cell carcinoma  Basal cell carcinoma usually occurs in sun-exposed areas of your body, such as your neck or face.  Basal cell carcinoma may appear as:  A pearly or waxy bump  A flat, flesh-colored or brown scar-like lesion  A bleeding or scabbing sore that heals and returns    Squamous cell carcinoma  Most often, squamous cell carcinoma occurs on sun-exposed areas of your body, such as your face, ears and hands. People with darker skin are more likely to develop squamous cell carcinoma on areas that aren't often exposed to the sun.  Squamous cell carcinoma may appear as:  A firm, red nodule  A flat lesion with a scaly, crusted surface    Reach out to your doctor if you develop new spots that are growing, not healing, becoming painful, and/or bleeding.       Learning the ABCDEs or Melanomas / Self Skin checks    Even if you have carefully practiced sun safety all summer, it's important to continue being vigilant about your skin in fall, winter, and beyond. Throughout the year, you should examine your skin head-to-toe once a month, looking for any suspicious lesions. Self-exams can help you identify potential skin cancers early, when they can almost always be completely cured. As a general rule, to spot either melanomas or non-melanoma skin cancers (such as basal cell carcinoma and squamous cell carcinoma), take note of any new moles or growths, and any existing growths that begin to grow or change significantly in any other way.  Lesions that change, itch, bleed, or don't heal are also alarm signals. Physicians have developed two specific strategies for early recognition ofmelanoma, the deadliest form of skin cancer: the ABCDEs and the Ugly Duckling sign.      Moles, brown spots and growths on the skin are usually harmless -- but not always. Anyone who has more than 100 moles is at greater risk for melanoma. The first signs can appear in one or more atypical moles. That's why it's so important to get to know your  skin very well and to recognize any changes in the moles on your body. Look for the ABCDE signs of melanoma, and if you see one or more, make an appointment with a physician immediately.    Common, benign moles look the same over time. Be on alert when moles start to evolve or change and see a doctor. Any change -- in size, shape, color, elevation, or another trait, or any new symptom such as bleeding, itching or crusting -- points to danger.    A - Asymmetry  This benign mole is not asymmetrical. If you draw a line through the middle, the two sides will match, meaning it is symmetrical. If you draw a line through this mole, the two halves will not match, meaning it is asymmetrical, a warning sign for melanoma.    B - Border  A benign mole has smooth, even borders, unlike melanomas. The borders of an early melanoma tend to be uneven. The edges may be scalloped or notched.     C - Color  Most benign moles are all one color -- often a single shade of brown. Having a variety of colors is another warning signal. A number of different shades of brown, tan or black could appear. A melanoma may also become red, white or blue.      D - Diameter  Benign moles usually have a smaller diameter than malignant ones. Melanomas usually are larger in diameter than the eraser on your pencil tip (  inch or 6mm), but they may sometimes be smaller when first detected.      E - Evolution  Common, benign moles look the same over time. Be on the alert when a mole starts to evolve or change in any way. When a mole is evolving, see a doctor. Any change -- in size, shape, color, elevation, or another trait, or any new symptom such as bleeding, itching or crusting -- points to danger.    The Ugly Duckling Rule  This is a simplified method where you look for any moles that do not match the other moles you have. Even if some of your moles look a little funny we are less concerned if they match one another. We are looking for the outlier - the  "one that is darker, larger, etc. In A, there is one dominant mole pattern with slight variation in size. The outlier lesion is clearly darker and larger than all other moles. In B, the patient has two predominant mole patterns, one with larger nevi and one with small, darker nevi. The outlier lesion is small but lacks pigmentation. In C, the patient shows only one lesion on the back. If this lesion is changing, symptomatic, or deemed atypical, it should be removed.      Seborrheic keratoses - a mimicker of melanoma    Seborrheic keratosis (seb-o-REE-ik care-uh-TOE-sis) is a common skin growth. It may seem worrisome because it can look like a wart, pre-cancerous skin growth (actinic keratosis), or skin cancer. Despite their appearance, seborrheic keratoses are harmless.    Most people get these growths when they are middle aged or older. Because they begin at a later age and can have a flat, waxy or wart-like appearance, seborrheic keratoses are often called the  barnacles of aging  or \"wisdom spots\".    It s possible to have just one of these growths, but most people develop several. Seborrheic keratoses range in color from white to black; however, most are tan or brown. You can find these harmless growths anywhere on the skin, except the palms and soles. Most often, you ll see them on the chest, back, head, or neck. Seborrheic keratoses are not contagious.        Cryotherapy    What is it?  Use of a very cold liquid, such as liquid nitrogen, to freeze and destroy abnormal skin cells that need to be removed    What should I expect?  Tenderness and redness  A small blister that might grow and fill with dark purple blood. There may be crusting.  More than one treatment may be needed if the lesions do not go away.    How do I care for the treated area?  Gently wash the area with your hands when bathing.  Use a thin layer of Vaseline to help with healing. You may use a Band-Aid.   The area should heal within 7-10 days " and may leave behind a pink or lighter color.   Do not use an antibiotic or Neosporin ointment.   You may take acetaminophen (Tylenol) for pain.     Call your doctor if you have:  Severe pain  Signs of infection (warmth, redness, cloudy yellow drainage, and or a bad smell)  Questions or concerns    Who should I call with questions?      [facility]: [phone]      [facility]: [phone]      For urgent needs outside of business hours call the [facility] at [phone] and ask for the dermatology resident on call

## 2024-05-23 NOTE — PROGRESS NOTES
I talked with and examined Ame Ayala and I agree with the assessment and the plan. NESHA Cobos MD.

## 2024-05-30 ENCOUNTER — LAB (OUTPATIENT)
Dept: LAB | Facility: CLINIC | Age: 29
End: 2024-05-30
Attending: ADVANCED PRACTICE MIDWIFE
Payer: COMMERCIAL

## 2024-05-30 ENCOUNTER — OFFICE VISIT (OUTPATIENT)
Dept: OBGYN | Facility: CLINIC | Age: 29
End: 2024-05-30
Attending: ADVANCED PRACTICE MIDWIFE
Payer: COMMERCIAL

## 2024-05-30 VITALS
HEIGHT: 65 IN | WEIGHT: 199 LBS | DIASTOLIC BLOOD PRESSURE: 72 MMHG | HEART RATE: 74 BPM | BODY MASS INDEX: 33.15 KG/M2 | SYSTOLIC BLOOD PRESSURE: 115 MMHG

## 2024-05-30 DIAGNOSIS — Z83.3 FAMILY HISTORY OF DIABETES MELLITUS: ICD-10-CM

## 2024-05-30 DIAGNOSIS — Z00.00 VISIT FOR PREVENTIVE HEALTH EXAMINATION: Primary | ICD-10-CM

## 2024-05-30 DIAGNOSIS — L68.0 HIRSUTISM: ICD-10-CM

## 2024-05-30 LAB
HBA1C MFR BLD: 5.4 %
TSH SERPL DL<=0.005 MIU/L-ACNC: 1.78 UIU/ML (ref 0.3–4.2)

## 2024-05-30 PROCEDURE — 83498 ASY HYDROXYPROGESTERONE 17-D: CPT

## 2024-05-30 PROCEDURE — 83036 HEMOGLOBIN GLYCOSYLATED A1C: CPT

## 2024-05-30 PROCEDURE — G0463 HOSPITAL OUTPT CLINIC VISIT: HCPCS | Performed by: ADVANCED PRACTICE MIDWIFE

## 2024-05-30 PROCEDURE — 99395 PREV VISIT EST AGE 18-39: CPT | Performed by: ADVANCED PRACTICE MIDWIFE

## 2024-05-30 PROCEDURE — 84403 ASSAY OF TOTAL TESTOSTERONE: CPT

## 2024-05-30 PROCEDURE — 36415 COLL VENOUS BLD VENIPUNCTURE: CPT

## 2024-05-30 PROCEDURE — 84443 ASSAY THYROID STIM HORMONE: CPT

## 2024-05-30 RX ORDER — VITAMIN B COMPLEX
TABLET ORAL DAILY
COMMUNITY

## 2024-05-30 ASSESSMENT — PAIN SCALES - GENERAL: PAINLEVEL: NO PAIN (0)
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
## 2024-05-30 NOTE — PATIENT INSTRUCTIONS

## 2024-06-01 LAB — TESTOST SERPL-MCNC: 35 NG/DL (ref 8–60)

## 2024-06-03 ENCOUNTER — ANCILLARY PROCEDURE (OUTPATIENT)
Dept: ULTRASOUND IMAGING | Facility: CLINIC | Age: 29
End: 2024-06-03
Attending: ADVANCED PRACTICE MIDWIFE
Payer: COMMERCIAL

## 2024-06-03 DIAGNOSIS — L68.0 HIRSUTISM: ICD-10-CM

## 2024-06-03 PROCEDURE — 76830 TRANSVAGINAL US NON-OB: CPT

## 2024-06-03 PROCEDURE — 76830 TRANSVAGINAL US NON-OB: CPT | Mod: 26 | Performed by: OBSTETRICS & GYNECOLOGY

## 2024-06-06 LAB — 17OHP SERPL-MCNC: 40 NG/DL

## 2024-06-11 ENCOUNTER — TELEPHONE (OUTPATIENT)
Dept: ORTHOPEDICS | Facility: CLINIC | Age: 29
End: 2024-06-11
Payer: COMMERCIAL

## 2024-06-11 NOTE — TELEPHONE ENCOUNTER
- A call was placed to the patient. Patient did not answer phone so a voicemail was left.     - Call back number to clinic was given and patient was told to call back and ask for Dr. Anita Carr's nurse.     - I do not think she actually needs to follow-up unless she notices a new mass.

## 2024-06-19 ENCOUNTER — OFFICE VISIT (OUTPATIENT)
Dept: ORTHOPEDICS | Facility: CLINIC | Age: 29
End: 2024-06-19
Payer: COMMERCIAL

## 2024-06-19 DIAGNOSIS — M79.605 PAIN IN BOTH LOWER EXTREMITIES: ICD-10-CM

## 2024-06-19 DIAGNOSIS — D17.9 INTRAMUSCULAR LIPOMA: Primary | ICD-10-CM

## 2024-06-19 DIAGNOSIS — M79.604 PAIN IN BOTH LOWER EXTREMITIES: ICD-10-CM

## 2024-06-19 DIAGNOSIS — F43.10 PTSD (POST-TRAUMATIC STRESS DISORDER): ICD-10-CM

## 2024-06-19 PROCEDURE — 99213 OFFICE O/P EST LOW 20 MIN: CPT | Performed by: ORTHOPAEDIC SURGERY

## 2024-06-19 RX ORDER — DIAZEPAM 5 MG
TABLET ORAL
Qty: 1 TABLET | Refills: 0 | Status: SHIPPED | OUTPATIENT
Start: 2024-06-19 | End: 2024-08-08

## 2024-06-19 NOTE — PROGRESS NOTES
This patient had a lipoma excised from her left anterior thigh a year ago.  She feels like there is a fullness just medial to that.  She is training for a triathlon in August and has been doing a lot more running.  She is also complaining of bilateral calf pain at the musculotendinous junction of the Achilles tendon.    On examination she is alert oriented has normal mood and affect and is in no acute distress peer respirations regular nonlabored.  The are soft and nontender.  I do not appreciate mass in the thigh although there is some generalized fullness there.    Patient has no new imaging.    Her plan is to do an MRI of the thigh make sure she does not have a recurrence of her lipoma.  I will also send her to therapy to work on her activity related calf pain.  We will reach out to the results of the MRI.  She did not have an atypical lipoma so recurrence would be unusual.  I have answered all the patient's questions today.

## 2024-06-19 NOTE — NURSING NOTE
Reason For Visit:   Chief Complaint   Patient presents with    RECHECK     Discuss bilateral achilles tightness and pain. Pt wonders if this related to her surgery. She notes that she has been doing a lot of running and swimming     -discuss swelling at incision area      28 year old  1995      Primary MD: Malinda Sarmiento      [facility] 05/04/2024 (Exact Date)     Pain Assessment  Patient Currently in Pain: Yes  0-10 Pain Scale: 2  Primary Pain Location:  (bilateral achilles, right > left)            To [facility]

## 2024-06-19 NOTE — LETTER
6/19/2024      Ame Ayala  [address]  [address]  [place] [address]      Dear Colleague,    Thank you for referring your patient, Ame Ayala, to the [facility] [place]. Please see a copy of my visit note below.    This patient had a lipoma excised from her left anterior thigh a year ago.  She feels like there is a fullness just medial to that.  She is training for a triathlon in August and has been doing a lot more running.  She is also complaining of bilateral calf pain at the musculotendinous junction of the Achilles tendon.    On examination she is alert oriented has normal mood and affect and is in no acute distress peer respirations regular nonlabored.  The are soft and nontender.  I do not appreciate mass in the thigh although there is some generalized fullness there.    Patient has no new imaging.    Her plan is to do an MRI of the thigh make sure she does not have a recurrence of her lipoma.  I will also send her to therapy to work on her activity related calf pain.  We will reach out to the results of the MRI.  She did not have an atypical lipoma so recurrence would be unusual.  I have answered all the patient's questions today.        Teo Howard MD

## 2024-07-10 ENCOUNTER — THERAPY VISIT (OUTPATIENT)
Dept: PHYSICAL THERAPY | Facility: CLINIC | Age: 29
End: 2024-07-10
Attending: ORTHOPAEDIC SURGERY
Payer: COMMERCIAL

## 2024-07-10 DIAGNOSIS — M79.605 PAIN IN BOTH LOWER EXTREMITIES: ICD-10-CM

## 2024-07-10 DIAGNOSIS — M79.604 PAIN IN BOTH LOWER EXTREMITIES: ICD-10-CM

## 2024-07-10 PROCEDURE — 97110 THERAPEUTIC EXERCISES: CPT | Mod: GP

## 2024-07-10 PROCEDURE — 97161 PT EVAL LOW COMPLEX 20 MIN: CPT | Mod: GP

## 2024-07-10 ASSESSMENT — ACTIVITIES OF DAILY LIVING (ADL)
KNEEL ON THE FRONT OF YOUR KNEE: ACTIVITY IS SOMEWHAT DIFFICULT
GO UP STAIRS: ACTIVITY IS MINIMALLY DIFFICULT
KNEE_ACTIVITY_OF_DAILY_LIVING_SCORE: 81.43
LEFS_RAW_SCORE: INCOMPLETE
WALK: ACTIVITY IS MINIMALLY DIFFICULT
PLEASE_INDICATE_YOR_PRIMARY_REASON_FOR_REFERRAL_TO_THERAPY:: KNEE
PLEASE_INDICATE_YOR_PRIMARY_REASON_FOR_REFERRAL_TO_THERAPY:: FOOT AND/OR ANKLE
STIFFNESS: THE SYMPTOM AFFECTS MY ACTIVITY SLIGHTLY
HOW_WOULD_YOU_RATE_THE_OVERALL_FUNCTION_OF_YOUR_KNEE_DURING_YOUR_USUAL_DAILY_ACTIVITIES?: NEARLY NORMAL
RISE FROM A CHAIR: ACTIVITY IS NOT DIFFICULT
STIFFNESS: THE SYMPTOM AFFECTS MY ACTIVITY SLIGHTLY
SWELLING: I DO NOT HAVE THE SYMPTOM
SIT WITH YOUR KNEE BENT: ACTIVITY IS NOT DIFFICULT
WEAKNESS: I DO NOT HAVE THE SYMPTOM
LIMPING: THE SYMPTOM AFFECTS MY ACTIVITY SLIGHTLY
PAIN: THE SYMPTOM AFFECTS MY ACTIVITY SLIGHTLY
LEFS_SCORE(%): INCOMPLETE
GIVING WAY, BUCKLING OR SHIFTING OF KNEE: I DO NOT HAVE THE SYMPTOM
GO UP STAIRS: ACTIVITY IS MINIMALLY DIFFICULT
LIMPING: THE SYMPTOM AFFECTS MY ACTIVITY SLIGHTLY
SQUAT: ACTIVITY IS MINIMALLY DIFFICULT
STAND: ACTIVITY IS MINIMALLY DIFFICULT
WALK: ACTIVITY IS MINIMALLY DIFFICULT
RISE FROM A CHAIR: ACTIVITY IS NOT DIFFICULT
RAW_SCORE: 57
SQUAT: ACTIVITY IS MINIMALLY DIFFICULT
GO DOWN STAIRS: ACTIVITY IS MINIMALLY DIFFICULT
KNEE_ACTIVITY_OF_DAILY_LIVING_SUM: 57
PAIN: THE SYMPTOM AFFECTS MY ACTIVITY SLIGHTLY
ANY_OF_YOUR_USUAL_WORK,_HOUSEWORK_OR_SCHOOL_ACTIVITIES: A LITTLE BIT OF DIFFICULTY
STAND: ACTIVITY IS MINIMALLY DIFFICULT
SWELLING: I DO NOT HAVE THE SYMPTOM
SIT WITH YOUR KNEE BENT: ACTIVITY IS NOT DIFFICULT
GO DOWN STAIRS: ACTIVITY IS MINIMALLY DIFFICULT
KNEEL ON THE FRONT OF YOUR KNEE: ACTIVITY IS SOMEWHAT DIFFICULT
WEAKNESS: I DO NOT HAVE THE SYMPTOM
GIVING WAY, BUCKLING OR SHIFTING OF KNEE: I DO NOT HAVE THE SYMPTOM
HOW_WOULD_YOU_RATE_THE_OVERALL_FUNCTION_OF_YOUR_KNEE_DURING_YOUR_USUAL_DAILY_ACTIVITIES?: NEARLY NORMAL

## 2024-07-10 NOTE — PROGRESS NOTES
PHYSICAL THERAPY EVALUATION  Type of Visit: Evaluation       Fall Risk Screen:  Fall screen completed by: PT  Have you fallen 2 or more times in the past year?: No  Have you fallen and had an injury in the past year?: No  Is patient a fall risk?: No    KEY PT FINDINGS:  1) Positive Cassidy's Sign on LLE  2) Mild weakness in glut medius bilateral  3) Lack of eccentric control with endurance testing of SL heel raise bilateral    Therapist Assessment: Ame Ayala is a 28 year old female patient presenting to Physical Therapy with bilateral ankle and left knee pain. Patient demonstrates pain, weakness, decreased activity tolerance, and ROM restrictions. Signs and symptoms are consistent with anterior knee pain and bilateral mid substance achilles tendonitis. These impairments limit their ability to run, perform stairs, and walk. Skilled PT services are necessary in order to reduce impairments and improve independent function.    Subjective History    Patient was referred by Teo Howard for Pain in both lower extremities [M79.604, M79.605]     Referring provider plan (if any precautions/restrictions): This patient had a lipoma excised from her left anterior thigh a year ago.  She feels like there is a fullness just medial to that.  She is training for a triathlon in August and has been doing a lot more running.  She is also complaining of bilateral calf pain at the musculotendinous junction of the Achilles tendon.     On examination she is alert oriented has normal mood and affect and is in no acute distress peer respirations regular nonlabored.  The are soft and nontender.  I do not appreciate mass in the thigh although there is some generalized fullness there.     Patient has no new imaging.     Her plan is to do an MRI of the thigh make sure she does not have a recurrence of her lipoma.  I will also send her to therapy to work on her activity related calf pain.  We will reach out to the results of the  MRI.  She did not have an atypical lipoma so recurrence would be unusual.  I have answered all the patient's questions today.    PT Subjective: Patient is a 28 year old female presenting to outpatient physical therapy with bilateral ankle pain. Is currently training for triathlon, noticing tight achilles bilateral, along with left anterior knee pain. Wondering if it's residual weakness from the thigh, has a history of lipoma resection in anterior thigh. After running her knee pain hurts, going up and down stairs hurts. Took a few weeks off from running which has helped her achilles pain but notes that with running. Her pain started about a month ago, a while after she started training for her triathlon. Both sides about equal. Pain is located up in muscle belly and near midsubstance achilles, not insertional.    L knee pain: constant sharp/steady pain    Pain: Pain Level at Rest: 0/10  Pain Level with Use: 4/10  Pain Location: knee and ankle  Pain Quality: Sharp and tight  Pain Frequency: intermittent  Pain is Worst: depending on activity  Pain is Exacerbated By: after running, hiking, stairs, biking  Pain is Relieved By: cold, NSAIDs, and rest  Pain Progression: Improved    Red Flags review findings: denies    Prior Treatment Includes: None    Medications:   Current Outpatient Medications   Medication Sig Dispense Refill    acetylcysteine (N-ACETYL CYSTEINE) 600 MG CAPS capsule Take 1,200 mg by mouth daily      albuterol (PROAIR HFA) 108 (90 Base) MCG/ACT inhaler Inhale 2 puffs into the lungs every 4 hours as needed for shortness of breath, wheezing or cough 17 g 3    busPIRone (BUSPAR) 15 MG tablet Take 15 mg by mouth 2 times daily      diazepam (VALIUM) 5 MG tablet Take tablet when MRI staff instructs you to do so at appointment. 1 tablet 0    lamoTRIgine (LAMICTAL) 25 MG tablet Take 150 mg by mouth      mometasone (ASMANEX TWISTHALER) 220 MCG/ACT inhaler Inhale 2 puffs into the lungs every evening (Patient not  taking: Reported on 6/19/2024) 1 each 3    triamcinolone (KENALOG) 0.1 % external cream Apply one to two times daily as needed for itch on chest until skin is clear. Do not put this on your face, groin, armpits, or buttocks. (Patient not taking: Reported on 5/30/2024) 80 g 2    Vitamin D3 (VITAMIN D, CHOLECALCIFEROL,) 25 mcg (1000 units) tablet Take by mouth daily (Patient not taking: Reported on 6/19/2024)       No current facility-administered medications for this visit.       Imaging: None    Lifestyle & General Medical History:   - Occupation: OT Student at [employer]   - Experience with physical activity: Hiking, Triathlon on 8/11   - Notable medical history:   Patient Active Problem List   Diagnosis    Substance abuse in remission (H)    PTSD (post-traumatic stress disorder)    Acute bilateral low back pain with right-sided sciatica    History of bipolar disorder    Bulimia nervosa (H28)    Cannabis dependence, in remission (H)    History of self-harm    Mild intermittent asthma without complication    Tachycardia    Migraine without status migrainosus, not intractable, unspecified migraine type    Chronic post-concussion headache    Intramuscular lipoma    Aftercare following surgery of the musculoskeletal system       Current Functional Status: Independent  Previous Functional Status:  fully functional prior to pain onset/injury.  Outcome measure: LEFS    General health as reported by patient: NT.    Additional considerations: Is currently swimming    Patient Goals for Physical Therapy: Be able to do Triathlon on August 11th      Subjective       Presenting condition or subjective complaint:    Date of onset: 07/10/24    Relevant medical history:     Dates & types of surgery:      Prior diagnostic imaging/testing results:       Prior therapy history for the same diagnosis, illness or injury:        Living Environment  Social support:     Type of home:     Stairs to enter the home:         Ramp:     Stairs  inside the home:         Help at home:    Equipment owned:       Employment:      Hobbies/Interests:      Patient goals for therapy:         Objective   Red Flags: (Bold when present) - reviewed the following and denies  Calf pain/swelling/warmth  Inability to weight bear immediately post injury      OBJECTIVE    Observation (Foot structure/appearance): no swelling seen  WNL  WNL    Edema (if applicable):    Figure 8: L: ; R:     Palpation: Not TTP    Maiden Ankle Rules: NA    Gait (shoes off): Push off decreased L, Push off decreased R    Functional Tests: Double Leg Squat: Hip internal rotation minor bilaterally  - SL Heel Raise (NORMS: Healthy >25, Pathological >17 reps): 17 reps bilateral - mild/mod fatigue and loss of eccentric control  - Knee to Wall Test (CKC DF; Norms 12.5 cm): 13cm on L, 15cm on R    Flexibility: WNL    Lumbar Spine Screen: WNL    KNEE: (* indicates patient's primary complaint)   PROM R PROM L AROM R AROM L MMT R MMT L   Hyper-Ext         Ext         Flx           Hip PROM:     L R   IR WNL WNL   ER Mild limit WNL   FLX Mild limit Mild limit   EXT WNL WNL     Hip MMT:   L R   FLX 5/5 5/5   IR 5/5 5/5   ER 5/5 5/5   ABD 5/5 5/5   EXT 5/5 5/5   Glut Med 4/5 4/5       Flexibility:   L R   Jem's Negative Negative   John     Ely's Negative Negative   SLR Negative Negative   90/90         Special tests:   L R    Ligamentous Testing   Anterior Drawer (ACL)     Lachman's (ACL) Pivot Shift (ACL)     Sag Sign (PCL)     Posterior Drawer (PCL)     Prone Dial Test at 30 Deg (PCL/PLC)     Prone Dial Test at 90 Deg (PCL/PLC)     Valgus 0 degrees (MCL/ACL/PCL)     Valgus 30 degrees (MCL)     Varus 0 degrees (LCL/ACL/PCL/PLC)     Varus 30 degrees (LCL)      Meniscus Testing   Artie's     Appley's     Thessaly's      Patellofemoral Testing   Cassidy's Sign Positive Negative   Patellar Apprehension Sign Negative Negative   *PF Special Examination: negative findings       Joint Mobility:      Left Right    Tib-Fib Proximal     Patellofemoral Medial WNL WNL   Patellofemoral Lateral Hypermobile Hypermobile   Patellofemoral Superior Hypermobile Hypermobile   Patellofemoral Inferior Hypermobile Hypermobile            Assessment & Plan   CLINICAL IMPRESSIONS  Medical Diagnosis: Pain in both lower extremities (M79.604, M79.605)    Treatment Diagnosis: Bilateral Foot/ankle pain   Impression/Assessment: Patient is a 28 year old female with bilateral ankle and left knee complaints.  The following significant findings have been identified: Pain, Decreased strength, Impaired gait, Impaired muscle performance, and Decreased activity tolerance. These impairments interfere with their ability to perform recreational activities as compared to previous level of function.     Clinical Decision Making (Complexity):  Clinical Presentation: Stable/Uncomplicated  Clinical Presentation Rationale: based on medical and personal factors listed in PT evaluation  Clinical Decision Making (Complexity): Low complexity    PLAN OF CARE  Treatment Interventions:  Modalities: Biofeedback, Contrast Bath, Cryotherapy, Dry Needling, Fluidotherapy, E-stim, Hot Pack, Infrared, Iontophoresis, Laser Light  Interventions: Gait Training, Manual Therapy, Neuromuscular Re-education, Therapeutic Activity, Therapeutic Exercise, Self-Care/Home Management    Long Term Goals     PT Goal 1  Goal Identifier: LTG1  Goal Description: Patient will be able to perform 25 single leg heel raises bilateral with no fatigue and no pain  Rationale: to maximize safety and independence with performance of ADLs and functional tasks;to maximize safety and independence within the home;to maximize safety and independence within the community;to maximize safety and independence with transportation;to maximize safety and independence with self cares  Target Date: 09/04/24  PT Goal 2  Goal Identifier: LTG2  Goal Description: Patient will report an improvement of at least 9 on the LEFS  to demonstrate MCID in 8 weeks  Rationale: to maximize safety and independence with performance of ADLs and functional tasks;to maximize safety and independence within the home;to maximize safety and independence within the community;to maximize safety and independence with transportation;to maximize safety and independence with self cares  Target Date: 09/04/24      Frequency of Treatment: 1x/2wks  Duration of Treatment: 8 weeks    Recommended Referrals to Other Professionals:   Education Assessment:   Learner/Method: Patient    Risks and benefits of evaluation/treatment have been explained.   Patient/Family/caregiver agrees with Plan of Care.     Evaluation Time:     PT Eval, Low Complexity Minutes (66171): 25       Signing Clinician: Lui Browning PT        [facility]                                                                                   OUTPATIENT PHYSICAL THERAPY      PLAN OF TREATMENT FOR OUTPATIENT REHABILITATION   Patient's Last Name, First Name, Ame Rain YOB: 1995   Provider's Name   [facility]   Medical Record No.  [id number]     Onset Date: 07/10/24  Start of Care Date:       Medical Diagnosis:  Pain in both lower extremities (M79.604, M79.605)      PT Treatment Diagnosis:  Bilateral Foot/ankle pain Plan of Treatment  Frequency/Duration: 1x/2wks/ 8 weeks    Certification date from   to           See note for plan of treatment details and functional goals     Lui Browning, PT                         I CERTIFY THE NEED FOR THESE SERVICES FURNISHED UNDER        THIS PLAN OF TREATMENT AND WHILE UNDER MY CARE     (Physician attestation of this document indicates review and certification of the therapy plan).              Referring Provider:  Teo Howard    Initial Assessment  See Epic Evaluation-

## 2024-07-11 ENCOUNTER — MYC MEDICAL ADVICE (OUTPATIENT)
Dept: OBGYN | Facility: CLINIC | Age: 29
End: 2024-07-11
Payer: COMMERCIAL

## 2024-07-24 ENCOUNTER — ANCILLARY PROCEDURE (OUTPATIENT)
Dept: MRI IMAGING | Facility: CLINIC | Age: 29
End: 2024-07-24
Attending: ORTHOPAEDIC SURGERY
Payer: COMMERCIAL

## 2024-07-24 DIAGNOSIS — D17.9 INTRAMUSCULAR LIPOMA: ICD-10-CM

## 2024-07-24 DIAGNOSIS — M79.604 PAIN IN BOTH LOWER EXTREMITIES: ICD-10-CM

## 2024-07-24 DIAGNOSIS — M79.605 PAIN IN BOTH LOWER EXTREMITIES: ICD-10-CM

## 2024-07-24 PROCEDURE — A9585 GADOBUTROL INJECTION: HCPCS | Performed by: RADIOLOGY

## 2024-07-24 PROCEDURE — 73720 MRI LWR EXTREMITY W/O&W/DYE: CPT | Mod: LT | Performed by: RADIOLOGY

## 2024-07-24 RX ORDER — GADOBUTROL 604.72 MG/ML
10 INJECTION INTRAVENOUS ONCE
Status: COMPLETED | OUTPATIENT
Start: 2024-07-24 | End: 2024-07-24

## 2024-07-24 RX ADMIN — GADOBUTROL 9 ML: 604.72 INJECTION INTRAVENOUS at 18:07

## 2024-07-26 ENCOUNTER — VIRTUAL VISIT (OUTPATIENT)
Dept: ORTHOPEDICS | Facility: CLINIC | Age: 29
End: 2024-07-26
Payer: COMMERCIAL

## 2024-07-26 DIAGNOSIS — D17.9 INTRAMUSCULAR LIPOMA: Primary | ICD-10-CM

## 2024-07-26 PROCEDURE — 99441 PR PHYSICIAN TELEPHONE EVALUATION 5-10 MIN: CPT | Mod: 93 | Performed by: ORTHOPAEDIC SURGERY

## 2024-07-26 NOTE — PROGRESS NOTES
I reviewed this patient's MRI and talked with her over the phone for about 10 minutes.  She was noticing some swelling and fullness in that area and feels that some of her clothing is not fitting the same on the left as it is on the right.  She was concerned she may have recurrence of her lipoma.  She is also curious about the finding of multi cystic left ovary.    Over the phone the patient was alert and appropriate and in no acute distress.      I reviewed the MRI and a did not see any obvious recurrence or or persistent tumor.  There is some fat around the vascular bundles but I am not convinced this represents persistent tumor.  It is possible this could be more clear in the future MRI.  Certainly the swelling the patient is noticing is not a recurrence.  I can see that the scar tissue is caused some irregularity of the subcutaneous fatty tissues compared to the opposite thigh.    I have recommended the patient see plastic surgeon to discuss this problematic scarred area and see if they have any recommendations.  She thought this would be reasonable and we will follow-up as needed for any left thigh swelling or masses.

## 2024-07-26 NOTE — LETTER
7/26/2024      Ame Ayala  [address]  [place] [address]      Dear Colleague,    Thank you for referring your patient, Ame Ayala, to the [facility] [place]. Please see a copy of my visit note below.    I reviewed this patient's MRI and talked with her over the phone for about 10 minutes.  She was noticing some swelling and fullness in that area and feels that some of her clothing is not fitting the same on the left as it is on the right.  She was concerned she may have recurrence of her lipoma.  She is also curious about the finding of multi cystic left ovary.    Over the phone the patient was alert and appropriate and in no acute distress.      I reviewed the MRI and a did not see any obvious recurrence or or persistent tumor.  There is some fat around the vascular bundles but I am not convinced this represents persistent tumor.  It is possible this could be more clear in the future MRI.  Certainly the swelling the patient is noticing is not a recurrence.  I can see that the scar tissue is caused some irregularity of the subcutaneous fatty tissues compared to the opposite thigh.    I have recommended the patient see plastic surgeon to discuss this problematic scarred area and see if they have any recommendations.  She thought this would be reasonable and we will follow-up as needed for any left thigh swelling or masses.      Again, thank you for allowing me to participate in the care of your patient.        Sincerely,        Teo Howard MD

## 2024-07-26 NOTE — NURSING NOTE
Reason For Visit:   Chief Complaint   Patient presents with    RECHECK     Review left thigh MRI results          29 year old  1995      Primary MD: Malinda Sarmiento        [facility] 05/04/2024 (Exact Date)     Pain Assessment  Patient Currently in Pain: Sudheer Inman, ATC

## 2024-08-08 RX ORDER — LAMOTRIGINE 150 MG/1
150 TABLET ORAL DAILY
COMMUNITY
Start: 2024-07-15

## 2024-08-12 ENCOUNTER — OFFICE VISIT (OUTPATIENT)
Dept: FAMILY MEDICINE | Facility: CLINIC | Age: 29
End: 2024-08-12
Attending: FAMILY MEDICINE
Payer: COMMERCIAL

## 2024-08-12 VITALS
DIASTOLIC BLOOD PRESSURE: 66 MMHG | HEIGHT: 65 IN | WEIGHT: 200 LBS | HEART RATE: 67 BPM | BODY MASS INDEX: 33.32 KG/M2 | SYSTOLIC BLOOD PRESSURE: 114 MMHG

## 2024-08-12 DIAGNOSIS — Z86.59 HX OF EATING DISORDER: ICD-10-CM

## 2024-08-12 DIAGNOSIS — E66.811 CLASS 1 OBESITY DUE TO EXCESS CALORIES WITHOUT SERIOUS COMORBIDITY WITH BODY MASS INDEX (BMI) OF 33.0 TO 33.9 IN ADULT: Primary | ICD-10-CM

## 2024-08-12 DIAGNOSIS — E66.09 CLASS 1 OBESITY DUE TO EXCESS CALORIES WITHOUT SERIOUS COMORBIDITY WITH BODY MASS INDEX (BMI) OF 33.0 TO 33.9 IN ADULT: Primary | ICD-10-CM

## 2024-08-12 PROCEDURE — G0463 HOSPITAL OUTPT CLINIC VISIT: HCPCS | Performed by: FAMILY MEDICINE

## 2024-08-12 PROCEDURE — 99214 OFFICE O/P EST MOD 30 MIN: CPT | Performed by: FAMILY MEDICINE

## 2024-08-12 NOTE — PROGRESS NOTES
"  Assessment & Plan     Class 1 obesity due to excess calories without serious comorbidity with body mass index (BMI) of 33.0 to 33.9 in adult  We discussed medications and that insurance doesn't pay for the medications.  She is physically active.  Given her hx of eating disorders dietary and nutritional management will be important.  We discussed the different weight management programs.  She thought health  would be best - she may need nutrition referral.    - Adult Comprehensive Weight Management  Referral      Hx of eating disorder  She had treatment in 2016 but still has some binging tendency - so dieting will be something that will need ot carefully watched.     PCOS  There was some question of PCOS on TVUS and on her MRI  of the thigh- this seems to be equivocal.   We reviewed her tests .  All which were normal. F/unit(s)p at her next annual with Midwife.      Time note (e4, 30'): The total of my time (on the date of service) for this service was 37 minutes, including discussion/face-to-face, chart review, interpretation not otherwise reported, documentation, and updating of the computerized record.      BMI  Estimated body mass index is 33.28 kg/m  as calculated from the following:    Height as of this encounter: 1.651 m (5' 5\").    Weight as of this encounter: 90.7 kg (200 lb).   Weight management plan: given referral           Return in about 1 year (around 8/12/2025).    Shari Mccloud is a 29 year old, presenting for the following health issues:  Weight loss    HPI  28 yo female with concern for weight loss.  She is very active - did a triathlon yesterday - has done weights in the past - exercises cardioi - 4-5x/wk -  had tests for PCOS but not conclusive - was within normal range.  Has never been in weight loss programs.  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  Struggles with binging a little -  has some body " "dysmorphia       Review of Systems  Constitutional, neuro, ENT, endocrine, pulmonary, cardiac, gastrointestinal, genitourinary, musculoskeletal, integument and psychiatric systems are negative, except as otherwise noted.      Objective    /66   Pulse 67   Ht 1.651 m (5' 5\")   Wt 90.7 kg (200 lb)   LMP 07/19/2024 (Approximate)   BMI 33.28 kg/m    Body mass index is 33.28 kg/m .  Physical Exam   GENERAL: alert and no distress  NECK: no adenopathy, no asymmetry, masses, or scars  RESP: lungs clear to auscultation - no rales, rhonchi or wheezes  CV: regular rate and rhythm, normal S1 S2, no S3 or S4, no murmur, click or rub, no peripheral edema  MS: no gross musculoskeletal defects noted, no edema  SKIN: no suspicious lesions or rashes  NEURO: Normal strength and tone, mentation intact and speech normal  PSYCH: mentation appears normal, affect normal/bright  LYMPH: no cervical, supraclavicular, adenopathy            Signed Electronically by: Britney Patterson MD PhD      "

## 2024-09-22 ENCOUNTER — OFFICE VISIT (OUTPATIENT)
Dept: URGENT CARE | Facility: URGENT CARE | Age: 29
End: 2024-09-22
Payer: COMMERCIAL

## 2024-09-22 VITALS
HEART RATE: 76 BPM | RESPIRATION RATE: 15 BRPM | SYSTOLIC BLOOD PRESSURE: 112 MMHG | DIASTOLIC BLOOD PRESSURE: 64 MMHG | OXYGEN SATURATION: 96 % | TEMPERATURE: 97.3 F | WEIGHT: 190 LBS | BODY MASS INDEX: 31.65 KG/M2 | HEIGHT: 65 IN

## 2024-09-22 DIAGNOSIS — B02.8 HERPES ZOSTER WITH COMPLICATION: Primary | ICD-10-CM

## 2024-09-22 DIAGNOSIS — L01.00 IMPETIGO: ICD-10-CM

## 2024-09-22 PROCEDURE — 99214 OFFICE O/P EST MOD 30 MIN: CPT | Performed by: PHYSICIAN ASSISTANT

## 2024-09-22 RX ORDER — MUPIROCIN 20 MG/G
OINTMENT TOPICAL 3 TIMES DAILY
Qty: 15 G | Refills: 0 | Status: SHIPPED | OUTPATIENT
Start: 2024-09-22

## 2024-09-22 RX ORDER — VALACYCLOVIR HYDROCHLORIDE 1 G/1
1000 TABLET, FILM COATED ORAL 3 TIMES DAILY
Qty: 21 TABLET | Refills: 0 | Status: SHIPPED | OUTPATIENT
Start: 2024-09-22 | End: 2024-09-29

## 2024-09-22 RX ORDER — FLUCONAZOLE 150 MG/1
TABLET ORAL
Qty: 1 TABLET | Refills: 0 | Status: SHIPPED | OUTPATIENT
Start: 2024-09-22

## 2024-09-22 RX ORDER — CEPHALEXIN 500 MG/1
500 CAPSULE ORAL 3 TIMES DAILY
Qty: 21 CAPSULE | Refills: 0 | Status: SHIPPED | OUTPATIENT
Start: 2024-09-22 | End: 2024-09-29

## 2024-09-22 NOTE — PATIENT INSTRUCTIONS
(B02.8) Herpes zoster with complication  (primary encounter diagnosis)  Comment:   Plan: valACYclovir (VALTREX) 1000 mg tablet            (L01.00) Impetigo  Comment:   Plan: cephALEXin (KEFLEX) 500 MG capsule, mupirocin         (BACTROBAN) 2 % external ointment            Shingles is considered contagious until lesions are crusted over
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
## 2024-09-22 NOTE — LETTER
September 22, 2024      Ame Ayala  [address]  [place] [address]        To Whom It May Concern:    Ame Ayala was seen in our clinic for a skin condition which is considered contagious until it has fully crusted over.        Sincerely,      Paris BEGUM, PAPingC

## 2024-09-23 ENCOUNTER — TELEPHONE (OUTPATIENT)
Dept: OPHTHALMOLOGY | Facility: CLINIC | Age: 29
End: 2024-09-23
Payer: COMMERCIAL

## 2024-09-23 ENCOUNTER — OFFICE VISIT (OUTPATIENT)
Dept: OPHTHALMOLOGY | Facility: CLINIC | Age: 29
End: 2024-09-23
Attending: OPTOMETRIST
Payer: COMMERCIAL

## 2024-09-23 DIAGNOSIS — H33.312 RETINAL TEAR, LEFT: ICD-10-CM

## 2024-09-23 DIAGNOSIS — B02.33 HERPES ZOSTER KERATOCONJUNCTIVITIS: Primary | ICD-10-CM

## 2024-09-23 PROCEDURE — G0463 HOSPITAL OUTPT CLINIC VISIT: HCPCS | Performed by: OPTOMETRIST

## 2024-09-23 PROCEDURE — 99203 OFFICE O/P NEW LOW 30 MIN: CPT | Performed by: OPTOMETRIST

## 2024-09-23 RX ORDER — NEOMYCIN SULFATE, POLYMYXIN B SULFATE, AND DEXAMETHASONE 3.5; 10000; 1 MG/G; [USP'U]/G; MG/G
0.5 OINTMENT OPHTHALMIC 4 TIMES DAILY
Qty: 5 G | Refills: 1 | Status: SHIPPED | OUTPATIENT
Start: 2024-09-23

## 2024-09-23 ASSESSMENT — VISUAL ACUITY
OS_SC: 20/20
OD_SC: 20/20
METHOD: SNELLEN - LINEAR

## 2024-09-23 ASSESSMENT — CONF VISUAL FIELD
OS_INFERIOR_TEMPORAL_RESTRICTION: 0
METHOD: COUNTING FINGERS
OS_INFERIOR_NASAL_RESTRICTION: 0
OS_SUPERIOR_NASAL_RESTRICTION: 0
OD_SUPERIOR_TEMPORAL_RESTRICTION: 0
OD_INFERIOR_TEMPORAL_RESTRICTION: 0
OS_NORMAL: 1
OS_SUPERIOR_TEMPORAL_RESTRICTION: 0
OD_NORMAL: 1
OD_INFERIOR_NASAL_RESTRICTION: 0
OD_SUPERIOR_NASAL_RESTRICTION: 0

## 2024-09-23 ASSESSMENT — TONOMETRY
OD_IOP_MMHG: 21
OS_IOP_MMHG: 20
IOP_METHOD: TONOPEN

## 2024-09-23 ASSESSMENT — SLIT LAMP EXAM - LIDS: COMMENTS: NORMAL

## 2024-09-23 ASSESSMENT — EXTERNAL EXAM - LEFT EYE: OS_EXAM: NORMAL

## 2024-09-23 ASSESSMENT — EXTERNAL EXAM - RIGHT EYE: OD_EXAM: NORMAL

## 2024-09-23 NOTE — NURSING NOTE
Chief Complaints and History of Present Illnesses   Patient presents with    Herpes Zoster Evaluation     Chief Complaint(s) and History of Present Illness(es)       Herpes Zoster Evaluation              Laterality: left eye    Associated symptoms: dryness, redness, tearing, headache, scalp tenderness, photophobia, itching and burning.  Negative for eye pain, fever and discharge              Comments    Patient reports onset 7 days ago, on the left side, with blurry vision starting left eye last night.  Started meds yesterday: BACTROBAN 2 %    Nelda Shanel OA 8:50 AM September 23, 2024

## 2024-09-23 NOTE — TELEPHONE ENCOUNTER
New left sided shingles on face/eye    Pt with tearing and redness of eye    Scheduled with Dr. Ivey at 0850 this morning.    Pt aware of date/time/location/duration/hospital based clinic/non-[employer] insurance.    Neel Dunn RN 7:41 AM 09/23/24

## 2024-09-23 NOTE — PATIENT INSTRUCTIONS
Left eye pain and redness  Zoster for last 5 days  Started Valtrex yesterday  Bactrin for vessicle near nose    Maxitrol ointment apply around left eye 3-4 x per day   Recheck Friday 27th   DFE then old retinal tear OS

## 2024-09-23 NOTE — TELEPHONE ENCOUNTER
[facility] Call Center    Phone Message    May a detailed message be left on voicemail: yes     Reason for Call: Appointment Intake    Referring Provider Name: Alfonso Linda at [facility]  Diagnosis and/or Symptoms: Herpes zoster    Sending TE per protocols. Please call pt to schedule. Thank you.    Action Taken: Message routed to:  [facility]    Travel Screening: Not Applicable     Date of Service:

## 2024-09-23 NOTE — PROGRESS NOTES
Assessment & Plan       Ame Ayala is a 29 year old female with the following diagnoses:   1. Herpes zoster keratoconjunctivitis - Left Eye          Left eye pain and redness  Zoster for last 5 days  Started Valtrex yesterday  Bactrin for vessicle near nose    Maxitrol ointment apply around left eye 3-4 x per day   Recheck Friday 27th     Patient disposition:   No follow-ups on file.          Complete documentation of historical and exam elements from today's encounter can be found in the full encounter summary report (not reduplicated in this progress note). I personally obtained the chief complaint(s) and history of present illness.  I confirmed and edited as necessary the review of systems, past medical/surgical history, family history, social history, and examination findings as documented by others; and I examined the patient myself. I personally reviewed the relevant tests, images, and reports as documented above. I formulated and edited as necessary the assessment and plan and discussed the findings and management plan with the patient and family.  Dr. Rome Ivey

## 2024-09-24 ENCOUNTER — TELEPHONE (OUTPATIENT)
Dept: OPHTHALMOLOGY | Facility: CLINIC | Age: 29
End: 2024-09-24
Payer: COMMERCIAL

## 2024-09-24 NOTE — TELEPHONE ENCOUNTER
[facility] Call Center    Phone Message    May a detailed message be left on voicemail: yes     Reason for Call: Other: Per pt she is stating that what she was told to do and what the instructions say to do are different. Please call to discuss. Thank you      Action Taken: Message routed to:  [facility]    Travel Screening: Not Applicable     Date of Service:

## 2024-09-24 NOTE — TELEPHONE ENCOUNTER
Spoke to pt at 1755    Pt states redness improved today and no worsening of symptoms    Reviewed ok to use robyn/poly/dex ointment around eye/vesicles of eye per note and does not need to place inside (reviewed ok though if some gets in eye).    Pt verbally demonstrated understanding and has follow up appt on Friday and aware to contact clinic for any worsening symptoms/vision changes.    Neel Dunn RN 5:57 PM 09/24/24

## 2024-09-27 ENCOUNTER — OFFICE VISIT (OUTPATIENT)
Dept: OPHTHALMOLOGY | Facility: CLINIC | Age: 29
End: 2024-09-27
Attending: OPTOMETRIST
Payer: COMMERCIAL

## 2024-09-27 DIAGNOSIS — B02.33 HERPES ZOSTER KERATOCONJUNCTIVITIS: Primary | ICD-10-CM

## 2024-09-27 PROCEDURE — G0463 HOSPITAL OUTPT CLINIC VISIT: HCPCS | Performed by: OPTOMETRIST

## 2024-09-27 PROCEDURE — 99213 OFFICE O/P EST LOW 20 MIN: CPT | Performed by: OPTOMETRIST

## 2024-09-27 ASSESSMENT — TONOMETRY
OS_IOP_MMHG: 21
OS_IOP_MMHG: 23
OD_IOP_MMHG: 22
IOP_METHOD: ICARE
IOP_METHOD: ICARE
OD_IOP_MMHG: 29

## 2024-09-27 ASSESSMENT — EXTERNAL EXAM - LEFT EYE: OS_EXAM: NORMAL

## 2024-09-27 ASSESSMENT — SLIT LAMP EXAM - LIDS: COMMENTS: NORMAL

## 2024-09-27 ASSESSMENT — VISUAL ACUITY
OD_SC: 20/20
OS_SC: 20/20
METHOD: SNELLEN - LINEAR

## 2024-09-27 ASSESSMENT — EXTERNAL EXAM - RIGHT EYE: OD_EXAM: NORMAL

## 2024-09-27 NOTE — PROGRESS NOTES
Assessment & Plan       Ame Ayala is a 29 year old female with the following diagnoses:   1. Herpes zoster keratoconjunctivitis - Left Eye          No pain or redness  Face feels better     Keratitis and conj resolved  Cont ointment until vesicles   Recheck as needed    Patient disposition:   No follow-ups on file.          Complete documentation of historical and exam elements from today's encounter can be found in the full encounter summary report (not reduplicated in this progress note). I personally obtained the chief complaint(s) and history of present illness.  I confirmed and edited as necessary the review of systems, past medical/surgical history, family history, social history, and examination findings as documented by others; and I examined the patient myself. I personally reviewed the relevant tests, images, and reports as documented above. I formulated and edited as necessary the assessment and plan and discussed the findings and management plan with the patient and family.  Dr. Rome Ivey           Known 12:00

## 2024-09-27 NOTE — PATIENT INSTRUCTIONS
No pain or redness  Face feels better     Keratitis and conj resolved  Cont ointment until vesicles   Recheck 2 weeks full exam

## 2024-09-27 NOTE — NURSING NOTE
Chief Complaints and History of Present Illnesses   Patient presents with    Herpes Zoster Follow Up       Herpes zoster keratoconjunctivitis - Left Eye         Chief Complaint(s) and History of Present Illness(es)       Herpes Zoster Follow Up              Laterality: left eye    Comments:   Herpes zoster keratoconjunctivitis - Left Eye                  Comments    Patient states improvement with left eye vision. No ocular pain. Some redness around the left eye with the rash present but not on the front of the eye per patient. No tearing or dryness. No complaints with right eye. Taking Neomycin/polymyxin/dexamethasone ointment about two to three times daily around the left eye.     Michelle Jarrell on 9/27/2024 at 2:38 PM

## 2024-10-18 ENCOUNTER — MYC MEDICAL ADVICE (OUTPATIENT)
Dept: INTERNAL MEDICINE | Facility: CLINIC | Age: 29
End: 2024-10-18
Payer: COMMERCIAL

## 2024-10-18 DIAGNOSIS — Z11.1 SCREENING FOR TUBERCULOSIS: Primary | ICD-10-CM

## 2024-10-21 NOTE — TELEPHONE ENCOUNTER
Quantiferon order is in.  Would you like her to get Pneumococcal vaccine?  If so please place future order.  Carmen Aponte RN

## 2024-10-22 DIAGNOSIS — J45.20 MILD INTERMITTENT ASTHMA WITHOUT COMPLICATION: Primary | ICD-10-CM

## 2024-10-25 ENCOUNTER — IMMUNIZATION (OUTPATIENT)
Dept: PEDIATRICS | Facility: CLINIC | Age: 29
End: 2024-10-25
Payer: COMMERCIAL

## 2024-10-25 ENCOUNTER — LAB (OUTPATIENT)
Dept: LAB | Facility: CLINIC | Age: 29
End: 2024-10-25
Payer: COMMERCIAL

## 2024-10-25 DIAGNOSIS — Z11.1 SCREENING FOR TUBERCULOSIS: ICD-10-CM

## 2024-10-25 PROCEDURE — 90471 IMMUNIZATION ADMIN: CPT

## 2024-10-25 PROCEDURE — 86481 TB AG RESPONSE T-CELL SUSP: CPT

## 2024-10-25 PROCEDURE — 90656 IIV3 VACC NO PRSV 0.5 ML IM: CPT

## 2024-10-25 PROCEDURE — 36415 COLL VENOUS BLD VENIPUNCTURE: CPT

## 2024-10-28 LAB
GAMMA INTERFERON BACKGROUND BLD IA-ACNC: 0 IU/ML
M TB IFN-G BLD-IMP: NEGATIVE
M TB IFN-G CD4+ BCKGRND COR BLD-ACNC: 10 IU/ML
MITOGEN IGNF BCKGRD COR BLD-ACNC: 0 IU/ML
MITOGEN IGNF BCKGRD COR BLD-ACNC: 0.01 IU/ML
QUANTIFERON MITOGEN: 10 IU/ML
QUANTIFERON NIL TUBE: 0 IU/ML
QUANTIFERON TB1 TUBE: 0.01 IU/ML
QUANTIFERON TB2 TUBE: 0

## 2024-10-31 ENCOUNTER — TELEPHONE (OUTPATIENT)
Dept: ENDOCRINOLOGY | Facility: CLINIC | Age: 29
End: 2024-10-31
Payer: COMMERCIAL

## 2024-10-31 NOTE — TELEPHONE ENCOUNTER
Left Voicemail (1st Attempt) and Sent Mychart (1st Attempt) for the patient to call back and reschedule the following:    Appointment type: New Med WT [facility] Nutrition  Appointment mode: Virtual Visit  Provider(s): Taryn Mart  Date: 1/8/25  Specialty phone number: [phone]    Additonal Notes: Can be rescheduled with any of the following providers: Zelda Zuñiga, Taryn Mart, or Michelle Gonzales

## 2024-11-01 NOTE — TELEPHONE ENCOUNTER
FUTURE VISIT INFORMATION      FUTURE VISIT INFORMATION:  Date: 1/29/25  Time: 9:30am  Location: [facility]  REFERRAL INFORMATION:  Referring provider:  Teo Howard MD   Referring providers clinic:  [facility]   Reason for visit/diagnosis  L. anterior thigh scar     RECORDS REQUESTED FROM:       Clinic name Comments Records Status Imaging Status   [facility]  Ov/referral 7/26/24 epic

## 2024-11-07 ENCOUNTER — MEDICAL CORRESPONDENCE (OUTPATIENT)
Dept: HEALTH INFORMATION MANAGEMENT | Facility: CLINIC | Age: 29
End: 2024-11-07
Payer: COMMERCIAL

## 2024-12-04 NOTE — PROGRESS NOTES
Ame is a 29 year old that presents in clinic today for the following:     No chief complaint on file.        Screenings from encounters over the past 10 days    No data recorded       KATIE Piña CNP at 6:18 PM on 12/3/2024  Ame is a 29 year old that presents in clinic today for the following:     No chief complaint on file.        Screenings from encounters over the past 10 days    No data recorded       KATIE Piña CNP at 6:19 PM on 12/3/2024  Ame is a 29 year old that presents in clinic today for the following:     No chief complaint on file.        Screenings from encounters over the past 10 days    No data recorded       KATIE Piña CNP at 6:18 PM on 12/3/2024

## 2025-01-06 ENCOUNTER — OFFICE VISIT (OUTPATIENT)
Dept: ENDOCRINOLOGY | Facility: CLINIC | Age: 30
End: 2025-01-06
Payer: COMMERCIAL

## 2025-01-06 VITALS
DIASTOLIC BLOOD PRESSURE: 65 MMHG | OXYGEN SATURATION: 97 % | BODY MASS INDEX: 31.29 KG/M2 | HEART RATE: 75 BPM | SYSTOLIC BLOOD PRESSURE: 106 MMHG | HEIGHT: 65 IN | WEIGHT: 187.8 LBS

## 2025-01-06 DIAGNOSIS — E66.811 CLASS 1 OBESITY WITH SERIOUS COMORBIDITY AND BODY MASS INDEX (BMI) OF 30.0 TO 30.9 IN ADULT, UNSPECIFIED OBESITY TYPE: Primary | ICD-10-CM

## 2025-01-06 DIAGNOSIS — F50.25 BULIMIA NERVOSA IN REMISSION: ICD-10-CM

## 2025-01-06 PROCEDURE — G2211 COMPLEX E/M VISIT ADD ON: HCPCS

## 2025-01-06 PROCEDURE — 99205 OFFICE O/P NEW HI 60 MIN: CPT

## 2025-01-06 RX ORDER — MULTIVITAMIN WITH IRON
TABLET ORAL
COMMUNITY
Start: 2025-01-03

## 2025-01-06 RX ORDER — METFORMIN HYDROCHLORIDE 500 MG/1
TABLET, EXTENDED RELEASE ORAL
Qty: 60 TABLET | Refills: 3 | Status: SHIPPED | OUTPATIENT
Start: 2025-01-06

## 2025-01-06 ASSESSMENT — SLEEP AND FATIGUE QUESTIONNAIRES
HOW LIKELY ARE YOU TO NOD OFF OR FALL ASLEEP IN A CAR, WHILE STOPPED FOR A FEW MINUTES IN TRAFFIC: WOULD NEVER DOZE
HOW LIKELY ARE YOU TO NOD OFF OR FALL ASLEEP WHILE WATCHING TV: SLIGHT CHANCE OF DOZING
HOW LIKELY ARE YOU TO NOD OFF OR FALL ASLEEP WHILE SITTING AND READING: MODERATE CHANCE OF DOZING
HOW LIKELY ARE YOU TO NOD OFF OR FALL ASLEEP WHEN YOU ARE A PASSENGER IN A CAR FOR AN HOUR WITHOUT A BREAK: SLIGHT CHANCE OF DOZING
HOW LIKELY ARE YOU TO NOD OFF OR FALL ASLEEP WHILE SITTING AND TALKING TO SOMEONE: WOULD NEVER DOZE
HOW LIKELY ARE YOU TO NOD OFF OR FALL ASLEEP WHILE SITTING INACTIVE IN A PUBLIC PLACE: WOULD NEVER DOZE
HOW LIKELY ARE YOU TO NOD OFF OR FALL ASLEEP WHILE LYING DOWN TO REST IN THE AFTERNOON WHEN CIRCUMSTANCES PERMIT: HIGH CHANCE OF DOZING
HOW LIKELY ARE YOU TO NOD OFF OR FALL ASLEEP WHILE SITTING QUIETLY AFTER LUNCH WITHOUT ALCOHOL: SLIGHT CHANCE OF DOZING

## 2025-01-06 ASSESSMENT — PAIN SCALES - GENERAL: PAINLEVEL_OUTOF10: NO PAIN (0)

## 2025-01-06 NOTE — NURSING NOTE
"(   Chief Complaint   Patient presents with    New Patient     New MWM    )    ( Weight: 85.2 kg (187 lb 12.8 oz) )  ( Height: 165.1 cm (5' 5\") )  ( BMI (Calculated): 31.25 )  (   )  ( Cumulative weight loss (lbs): 0 )  (   )  (   )  ( Waist Circumference (cm): 103 cm )  (   )    ( BP: 106/65 )  (   )  (   )  (   )  ( Pulse: 75 )  (   )  ( SpO2: 97 % )    (   Patient Active Problem List   Diagnosis    Substance abuse in remission (H)    PTSD (post-traumatic stress disorder)    Acute bilateral low back pain with right-sided sciatica    History of bipolar disorder    Bulimia nervosa    Cannabis dependence, in remission (H)    History of self-harm    Mild intermittent asthma without complication    Tachycardia    Migraine without status migrainosus, not intractable, unspecified migraine type    Chronic post-concussion headache    Intramuscular lipoma    Aftercare following surgery of the musculoskeletal system    )  (   Current Outpatient Medications   Medication Sig Dispense Refill    acetylcysteine (N-ACETYL CYSTEINE) 600 MG CAPS capsule Take 1,200 mg by mouth daily      albuterol (PROAIR HFA) 108 (90 Base) MCG/ACT inhaler Inhale 2 puffs into the lungs every 4 hours as needed for shortness of breath, wheezing or cough 17 g 3    busPIRone (BUSPAR) 15 MG tablet Take 15 mg by mouth 2 times daily      cholecalciferol (VITAMIN D3) 125 mcg (5000 units) capsule       cyanocobalamin (VITAMIN B-12) 500 MCG tablet       lamoTRIgine (LAMICTAL) 150 MG tablet Take 150 mg by mouth daily      Vitamin D3 (VITAMIN D, CHOLECALCIFEROL,) 25 mcg (1000 units) tablet Take by mouth daily (Patient not taking: Reported on 1/6/2025)      fluconazole (DIFLUCAN) 150 MG tablet Take 1 po after finishing antibiotic 1 tablet 0    mupirocin (BACTROBAN) 2 % external ointment Apply topically 3 times daily. 15 g 0    neomycin-polymyxin-dexAMETHasone (MAXITROL) 3.5-70129-3.1 ophthalmic ointment Place 0.1429 Applications (0.5 g) Into the left eye 4 times " daily. 5 g 1    valACYclovir (VALTREX) 1000 mg tablet Take 1 tablet (1,000 mg) by mouth 3 times daily for 7 days. 21 tablet 0    )  ( Diabetes Eval:    )    ( Pain Eval:  No Pain (0) )    ( Wound Eval:       )    (   History   Smoking Status    Former    Types: Cigarettes   Smokeless Tobacco    Former    Quit date: 12/24/2022    )    ( Signed By:  Parisa Cade, EMT January 6, 2025; 1:17 PM )

## 2025-01-06 NOTE — PATIENT INSTRUCTIONS
"Thank you for allowing us the privilege of caring for you. We hope we provided you with the excellent service you deserve.   Please let us know if there is anything else we can do for you so that we can be sure you are completely satisfied with your care experience.    To ensure the quality of our services you may be receiving a patient satisfaction survey from an independent patient satisfaction monitoring company.    The greatest compliment you can give is a \"Likely to Recommend\"    Your visit was with Payal Baeza PA-C today.    Instructions per today's visit:     Adams Ayala, it was great to visit with you today.  Here is a review of our visit.  If our clinic scheduler is not able to reach you please call [phone] to schedule your next appointments.    Plan  Start Metformin 500mg - take 1 tablet with a meal once daily for 7 days, then increase to 2 tablets with a meal once daily.   Goals we discussed today:   Write down all food you eat between now and meeting with dietician   Explore cutting out all sugary beverages   Labs ordered today: new [facility] labs   Follow up with Payal in 3 months   Dietician appointment scheduled 1/8/25       Information about Video Visits with [employer]: video visit information  _________________________________________________________________________________________________________________________________________________________  If you are asked by your clinic team to have your blood pressure checked:  [facility] do offer several locations for blood pressure checks. Please follow the below link to schedule an appointment. Scheduling an appointment at the pharmacy for a blood pressure check is now preferred.    Appointment Plus (appointment-plus.[employer])  _________________________________________________________________________________________________________________________________________________________  Important contact and scheduling information:  Please " call our contact center at [phone] to schedule your next appointments.  To find a lab location near you, please call [phone].  For any nursing questions or concerns call Eileen Dominguez LPN at [phone] or Parisa Sandoval RN at [phone]  Please call during clinic hours Monday through Friday 8:00a - 4:00p if you have questions or you can contact us via [employer] at anytime and we will reply during clinic hours.    Lab results will be communicated through My Chart or letter (if My Chart not used). Please call the clinic if you have not received communication after 1 week or if you have any questions.?  Clinic Fax: [phone]    _Interested in working with a health ?  Health coaches work with you to improve your overall health and wellbeing.  They look at the whole person, and may involve discussion of different areas of life, including, but not limited to the four pillars of health (sleep, exercise, nutrition, and stress management). Discuss with your care team if you would like to start working a health .  Health Coaching-3 Pack: Schedule by calling [phone]    $99 for three health coaching visits    Visits may be done in person or via phone    Coaching is a partnership between the  and the client; Coaches do not prescribe or diagnose    Coaching helps inspire the client to reach his/her personal goals   _________________________________________________________________________________________________________________________________________________________  __________  [facility] Get Moving Program  Our team of physical therapists is trained to help you understand and take control of your condition. They will perform a thorough evaluation to determine your ability for activity and develop a customized plan to fit your goals and physical ability.  Scheduling: Unsure if the Get Moving program is right for you? Discuss the program with your medical provider or  diabetes educator. You can also call us at [phone] to ask questions or schedule an appointment.   [facility] Get Moving Program  ____________________________________________________________________________________________________________________________________________________________________________        Thank ila,   [facility] Comprehensive Weight Management Team
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
## 2025-01-08 ENCOUNTER — VIRTUAL VISIT (OUTPATIENT)
Dept: ENDOCRINOLOGY | Facility: CLINIC | Age: 30
End: 2025-01-08
Payer: COMMERCIAL

## 2025-01-08 VITALS — HEIGHT: 65 IN | BODY MASS INDEX: 30.82 KG/M2 | WEIGHT: 185 LBS

## 2025-01-08 DIAGNOSIS — E66.811 CLASS 1 OBESITY WITH SERIOUS COMORBIDITY AND BODY MASS INDEX (BMI) OF 30.0 TO 30.9 IN ADULT, UNSPECIFIED OBESITY TYPE: ICD-10-CM

## 2025-01-08 DIAGNOSIS — Z71.3 NUTRITIONAL COUNSELING: Primary | ICD-10-CM

## 2025-01-08 PROCEDURE — 97802 MEDICAL NUTRITION INDIV IN: CPT | Mod: 95

## 2025-01-08 PROCEDURE — 99207 PR NO CHARGE LOS: CPT | Mod: 95

## 2025-01-08 ASSESSMENT — PAIN SCALES - GENERAL: PAINLEVEL_OUTOF10: NO PAIN (0)

## 2025-01-08 NOTE — LETTER
2025       RE: Ame Ayala  [address]  [place] [address]     Dear Colleague,    Thank you for referring your patient, Ame Ayala, to the [facility] [place] at [facility]. Please see a copy of my visit note below.    Video-Visit Details    Type of service:  Video Visit    Video Start Time: 9:36 AM   Video End Time: 10:12 AM     Originating Location (pt. Location): Home    Distant Location (provider location):  Offsite (providers home) [facility] [place]     Platform used for Video Visit: Hungrio    New Weight Management Nutrition Consultation    Ame Ayala is a 29 year old female presents today for new weight management nutrition consultation.  Patient referred by YVONNE Samson on 2025.    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 Developed full fledged bulimia in college, was purging after every meal at the time. Also started bipolar disorder medication in college and saw further weight gain.     Treatment with bunny in 2018, no purging since that time. Has had the urge to purge over the last few years but has never engaged in this- knows this is a slippery slope. Currently is working with a therapist, seeing them weekly. Still feels like at times she is struggling with binge eating tendencies, feels she has improved on this over the last few months.     Anthropometrics:  Initial consult weight: 187 lb on 25  Estimated body mass index is 31.25 kg/m  as  "calculated from the following:    Height as of 1/6/25: 1.651 m (5' 5\").    Weight as of 1/6/25: 85.2 kg (187 lb 12.8 oz).    Medications for Weight Loss:  Metformin     NUTRITION HISTORY  Food allergies: NKFA  Food intolerances: lactose sensitive - just mindful with how much.   Vitamin/Mineral Supplements: Zinc, Vitamin C, Vitamin D, Vitamin B12   Previous methods of diet modification for weight loss: watching portions   RD before: Reva dietitian in 2018.     Per Payal's visit: pt typically eats 3 meals a day. Craves salty, crunchy, also loves ice cream, cheese sticks. Is able to get full. Can stay full until next meal, but sometimes will eat out of boredom in between meals. Does struggle with portion control. Does experience food noise . Does experience emotional eating (stress, depression). Does at times a loss of control around eating, estimates this is happening a few times a week. Has been diagnosed with bulimia in the past. No purging since 2018, but still feels she goes through phases of binge eating tendencies.     Eats out/ gets take out 1-2 times   a week. Drinks mostly water, sparkling. Working on reducing pop- partner drinks a lot of pop so this is tough. Right now estimates having regular doctor pepper 2x a week. Coffee with cream and sugar flavoring occasionally, if she's studying- when she's in school maybe 2-3 times a week. 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     Portion control, struggles with binge eating and emotional eating, goal setting.     Likes to be distracted during the eating process but feels this may lead her to overeat or mindless eat.     Diet recall:   Breakfast - smaller, Apple + granola bar if on the go OR if home will make eggs with piece of fruit or toast.   Lunch - 12-1 pm, meal prepped or leftovers OR turkey sandwich with spinach and tomato with couple of chips + raw carrots with ranch.   Dinner - 8-9 PM Two days works over the evening and will go out for food. OR spaghetti, grain + " protein + sauce.  Asian Teriyaki with chicken and raul rice. Will eat out with partner 1-2x a week.   Snacks - cheese stick, chips and salsa, veggies with hummus or ranch OR fruit   Hydration: Drinks mostly water, or sparkling water. Working on reducing regular Dr. Pepper to 2x a week. Coffee with cream and sugar flavor occasionally   Alcohol: None, has been sober for 5 years         1/6/2025    10:44 AM   Diet Recall Review with Patient   If you do eat breakfast, what types of food do you eat? Apple & granola bar, eggs with tomato, yogurt & berries   If you do eat lunch, what types of food do you typically eat? Turkey sandwich with chips & carrots with ranch, burrito bowl, leftovers, pasta   If you do eat supper, what types of food do you typically eat? Chicken & rice, take out or dine out, pasta, pizza, chicken nuggets   If you do snack, what types of food do you typically eat? Chips, raw veggies, cheese sticks, fruit   How many glasses of juice do you drink in a typical day? 0   How many of glasses of milk do you drink in a typical day? 0   How many 8oz glasses of sugar containing drinks such as Tawanda-Aid/sweet tea do you drink in a day? 0   How many cans/bottles of sugar pop/soda/tea/sports drinks do you drink in a day? 0.5   How many cans/bottles of diet pop/soda/tea or sports drink do you drink in a day? 0   How often do you have a drink of alcohol? Never           1/6/2025    10:44 AM   Eating Habits   Generally, my meals include foods like these bread, pasta, rice, potatoes, corn, crackers, sweet dessert, pop, or juice Almost Everyday   Generally, my meals include foods like these fried meats, brats, burgers, french fries, pizza, cheese, chips, or ice cream A Few Times a Week   Eat fast food (like McDonalds, Burger Waqas, Taco Bell) Less Than Weekly   Eat at a buffet or sit-down restaurant Less Than Weekly   Eat most of my meals in front of the TV or computer Everyday   Often skip meals, eat at random  times, have no regular eating times A Few Times a Week   Rarely sit down for a meal but snack or graze throughout Less Than Weekly   Eat extra snacks between meals A Few Times a Week   Eat most of my food at the end of the day A Few Times a Week   Eat in the middle of the night or wake up at night to eat Less Than Weekly   Eat extra snacks to prevent or correct low blood sugar Never   Eat to prevent acid reflux or stomach pain A Few Times a Week   Worry about not having enough food to eat Less Than Weekly   I eat when I am depressed Everyday   I eat when I am stressed Almost Everyday   I eat when I am bored Almost Everyday   I eat when I am anxious A Few Times a Week   I eat when I am happy or as a reward A Few Times a Week   I feel hungry all the time even if I just have eaten Once a Week   Feeling full is important to me Once a Week   I finish all the food on my plate even if I am already full Almost Everyday   I can't resist eating delicious food or walk past the good food/smell Once a Week   I eat/snack without noticing that I am eating Less Than Weekly   I eat when I am preparing the meal Less Than Weekly   I eat more than usual when I see others eating A Few Times a Week   I have trouble not eating sweets, ice cream, cookies, or chips if they are around the house Almost Everyday   I think about food all day A Few Times a Week   What foods, if any, do you crave? Chips/Crackers   Please list any other foods you crave? Cheese, fruit, ice cream, french fries           1/6/2025    10:44 AM   Amount of Food   I feel out of control when eating Almost Everyday   I eat a large amount of food, like a loaf of bread, a box of cookies, a pint/quart of ice cream, all at once Monthly   I eat a large amount of food even when I am not hungry Weekly   I eat rapidly Weekly   I eat alone because I feel embarrassed and do not want others to see how much I have eaten Monthly   I eat until I am uncomfortably full Weekly   I feel  bad, disgusted, or guilty after I overeat Almost Everyday       Physical Activity:  Goes to the gym 2-3x a week to get intentional exercise - weight lifting and treadmill. Pickleball once a week.         1/6/2025    10:44 AM   Activity/Exercise History   How much of a typical 12 hour day do you spend sitting? Half the Day   How much of a typical 12 hour day do you spend lying down? Less Than Half the Day   How much of a typical day do you spend walking/standing? Half the Day   How many hours (not including work) do you spend on the TV/Video Games/Computer/Tablet/Phone? 2-3 Hours   How many times a week are you active for the purpose of exercise? 2-3 Times a Week   What keeps you from being more active? Pain    Lack of Time    Too tired   How many total minutes do you spend doing some activity for the purpose of exercising when you exercise? More Than 30 Minutes     Nutrition Prescription  Recommended energy/nutrient modification.    Nutrition Diagnosis  Obesity r/t long history of positive energy balance aeb BMI >30.    Nutrition Intervention  Materials/education provided on hypocaloric diet for weight loss. Discussed benefits of eating consistently throughout the day and not skipping meals, prioritizing protein and fiber in meals. Volumetric eating to help satiety level on fewer calories; portion control and healthy food choices (Plate Method and Volumetrics handouts), lower calorie snack choices, meal and snack planning tips and resources. Patient demonstrates understanding. Co-developed goals to work towards. Provided pt with list of goals and resources below via Tripbod.     Expected Engagement: good    Nutrition Goals  1) Try to not go longer than 4 hours without eating some food.   2) Aim to have a good source of protein with each eating episode  3) Aim for 60-90 grams of protein daily.   4) Increase fiber rich foods in your diet by having more fruits, vegetables, and whole grains  5) Try to reduce  sugar/calorie containing beverages.     Limit/avoid snacking as able. If snack is needed use lean protein and/or high-fiber foods. Examples:   - 2 cup popcorn   - 1 cup mixed berries   - 15 almonds, walnuts, cashews   - carrot/celery sticks and 2 tbsp low-fat ranch   - 1 hard boiled egg   - Part-skim mozzarella cheese stick   - Low-fat, low-sugar greek yogurt with 1/2 cup berries   - Med apple or pear   - sliced bell peppers with 1/2 cup salsa   - 1/2 cup roasted chickpeas   - sliced cucumbers with vinegar    100-Calorie Snack List: [email or web address]    100 calorie sweets: Smart Sweets, Dr. Thurston's Xylitol candy, Fiber One desserts, Fit and Active 100 calorie snack sweets at [facility]; [employer] 100 calorie pre-portioned cookies, sugar-free pudding, sugar-free jello.    Snack Recipes:  - Roasted chickpeas: https://www.diabetesfoodhub.org/recipes/roasted-and-spiced-chickpeas.html?home-category_id=23  - Black bean hummus with carrot and celery sticks: https://www.diabetesfoodhub.org/recipes/black-bean-hummus.html?home-category_id=23  - Chicken Satay with peanut sauce:  https://www.diabetesfoodhub.org/recipes/blueberry-almond-chicken-salad-lettuce-wraps.html?home-category_id=20  - Hummus Deviled Eggs:  https://www.diabetesfoodhub.org/recipes/hummus-deviled-eggs.html?home-category_id=1  - Lemon Raspberry Ijeoma Seed Pudding:  https://www.diabetesfoodhub.org/recipes/lemon-raspberry-ijeoma-seed-pudding.html?home-category_id=20  - Greek yogurt chocolate mouse: https://www.diabetesfoodhub.org/recipes/greek-yogurt-chocolate-mousse.html?home-category_id=23  - Broccoli Cheese Bites: https://www.diabetesfoodhub.org/recipes/broccoli-cheese-bites.html?home-category_id=20  - Cheese dip with sliced veggies: https://www.diabetesfoodhub.org/recipes/quick-easy-cheese-dip.html?home-category_id=20    Quick/Easy Protein Sources:  Hard boiled eggs  Part-skim cheese sticks  Baby Bell cheese rounds  Low-fat/low-sugar Greek  "yogurt  Low-fat cottage cheese  Lean deli meat (chicken/turkey/ham)  Roasted chickpeas or lentils  Nuts   Turkey meat stick  Protein shake/bar  \"P3\" snack (cheese, nuts, deli meat)  Aldi's \"Protein Bread\"   \"Egglife\" egg white wrap    Tuna/salmon can/packet      Non-meat protein Ideas  Quinoa  Eggs  Dairy (Cottage cheese, low fat cheese, greek yogurt)   Nuts  Beans  Lentils  Protein pasta   Nutritional yeast  Garden of life raw meal powder  Liquid aminos  Homemade meats - a taco meat could be made with chopped cauliflower/mushroom as an example   Hummus (could do homemade if preferred)  Hemp hearts   Tofu  Seitan     Complex Carbohydrates high in protein  Quinoa  Brown Rice  Chick Pea  Buckwheat  Sweet Potatoes  Lentils  Legumes  Serra Beans  Black Beans   Farro   Kidney Beans     Faster meal component ideas:   Protein   Crockpot chicken  Rotisserie chicken  Deli meat  Ready made meat from store ([employer], [employer])  Cottage cheese  Greek yogurt  String cheese  Scrambled/hard boiled eggs  Canned tuna/chicken/salmon (or pouches)  Frozen, already cooked shrimp  Nuts/Seeds (handful)  Nut Butter (2 tbsp)  Grains  Minute grain cups (lentils, rice, quinoa)   90 second grain pouches  Whole grain bread (i.e. Lucian bread, etc.)  Carb balance tortillas  Quick oats  Whole wheat crackers  Fruit  Apples  Bananas  Peaches  Pears  Plums  Grapes  Berries (strawberries, blueberries, raspberries, blackberries)  Cherries  Frozen fruit  Fruit canned in 100% fruit juice  Vegetables  Mini cucumbers  Baby carrots  Mini bell peppers  Snap peas  Pre-cut vegetables (i.e. broccoli, brussels sprouts, etc.)  Frozen vegetables (steam in bag)  Canned vegetables (recommend low sodium options)  Other   Hemp hearts, ijeoma, and other seeds      Five Food Groups Pantry List  Vegetables: Keep a variety of canned tomatoes in stock (diced, crushed, whole, stewed). Use them in soups, stews, sauces, casseroles and more! Also,  a jar of " your favorite pasta sauce. Dried mushrooms are another great pantry item because they can add depth of flavor to your meals.    Fruits: Dried fruits such as raisins, dried cranberries and dried apricots provide dietary fiber. They also can add a punch of flavor to your morning breakfast, midday salad and dinner grains.    Milk and Dairy Products: Dried milk is a great back-up item to have on stock. You can use it in your coffee or tea. Boxed milk also is available in single-serving packages and is a great item for lunch boxes. Evaporated milk, available in cans in the baking aisle, can be substituted for liquid milk in most recipes.    Protein Foods: Stock up on canned or dried lentils and black, calvert, cannellini, garbanzo and kidney beans. Toss cooked beans in salads, soups, stews and other dishes. Canned tuna, anchovies and sardines.    Grains: Keep a stash of oatmeal, buckwheat and other whole-grain cereals in the pantry. For an extra boost, add nuts and fresh berries to these hot cereals. Barley, farro, quinoa and other grains are staples for healthy meals. Also, keep a variety of rice on hand. Long grain, short grain, basmati and brown rice. Spaghetti, ziti, penne and other pastas are great for an easy, quick and filling meal. Give yourself an extra nutrition boost by buying whole-grain pasta or trying pasta made from legumes.    Also, stock up on:  Condiments: Ketchup, mustard and relish can be stored in the pantry until opened. Once you open them, keep them in the fridge.  Oil and vinegar: Extra-virgin olive oil is a versatile, heart-healthy option. Other oils, such as peanut, walnut and sesame, add a burst of flavor to meals.  different types of vinegar, such as cider, white and balsamic. Each imparts a unique flavor to your recipes.  Stock: Vegetable, chicken and beef stock are the basics of many recipes. Opt for low-sodium versions or ones with no added salt.  Herbs and spices:  small  containers of ground herbs and spices. That way they are as fresh as possible when you use them.  Flax and other seeds: Flax and ijeoma seeds deliver protein, dietary fiber and omega-3 fatty acids. Add them to cereal, salads, sauces and home-baked goods. If you buy whole flaxseed, make sure you grind it up before eating so your body can absorb the nutrients.    Five Food Groups Freezer List  Vegetables:  some of your favorite frozen veggies. Look for packages without sodium. And, while you are in the produce aisle, grab some fresh herbs. When you get home, fill ice cube trays with chopped herbs, top off the herbs with boiling water, and carefully place in the freezer. Add these herb cubes as you prepare meals for a punch of freshness.    Fruits: Stash frozen berries and other fruits in the freezer.     Milk and Dairy Products: Freeze Parmesan and other pre-shredded cheeses -- toss them into soups, stews and pasta dishes.     Protein Foods: Stock up on salmon and other fatty fishes to ensure you have ready access to healthy fats. Lean meats and poultry also store well in the freezer. One tip: Make sure you move it to the refrigerator one day before cooking to give adequate time for defrosting. Keep a variety of nuts in the freezer. This helps prevent them from spoiling. Add them to cold cereal, salads, hot grains and other dishes.    Grains: Whole-grain corn tortillas freeze well and can be used for quick breakfasts, lunches or dinners. Can t eat that loaf of bread fast enough while it is fresh? Make it a habit to freeze part of the loaf and defrost slices as you need them.    The Plate Method  [email or web address]    Portion Control Without Measuring   [email or web address]     Protein Sources   [email or web address]     Carbohydrates  [email or web address]     Mindful Eating  [email or web address]     Summary of Volumetrics Eating Plan  [email or web address]      Follow-Up: February 12     Time spent with patient: 36 minutes.  Michelle Gonzales RD, LD      Again, thank you for allowing me to participate in the care of your patient.      Sincerely,    Michelle Gonzales RD

## 2025-01-08 NOTE — PATIENT INSTRUCTIONS
Adams Mccloud,     It was nice to meet you today!    Follow-up with RD on February 12.    Thank you,    Michelle Gonzales, JOSE J, LD  If you would like to schedule or reschedule an appointment with the RD, please call [phone]    Nutrition Goals  1) Try to not go longer than 4 hours without eating some food.   2) Aim to have a good source of protein with each eating episode  3) Aim for 60-90 grams of protein daily.   4) Increase fiber rich foods in your diet by having more fruits, vegetables, and whole grains  5) Try to reduce sugar/calorie containing beverages.     Limit/avoid snacking as able. If snack is needed use lean protein and/or high-fiber foods. Examples:   - 2 cup popcorn   - 1 cup mixed berries   - 15 almonds, walnuts, cashews   - carrot/celery sticks and 2 tbsp low-fat ranch   - 1 hard boiled egg   - Part-skim mozzarella cheese stick   - Low-fat, low-sugar greek yogurt with 1/2 cup berries   - Med apple or pear   - sliced bell peppers with 1/2 cup salsa   - 1/2 cup roasted chickpeas   - sliced cucumbers with vinegar    100-Calorie Snack List: [email or web address]    100 calorie sweets: Smart Sweets, Dr. Thurston's Xylitol candy, Fiber One desserts, Fit and Active 100 calorie snack sweets at [employer] [facility] [employer]; [employer] 100 calorie pre-portioned cookies, sugar-free pudding, sugar-free jello.    Snack Recipes:  - Roasted chickpeas: https://www.diabetesfoodhub.org/recipes/roasted-and-spiced-chickpeas.html?home-category_id=23  - Black bean hummus with carrot and celery sticks: https://www.diabetesfoodhub.org/recipes/black-bean-hummus.html?home-category_id=23  - Chicken Satay with peanut sauce:  https://www.diabetesfoodhub.org/recipes/blueberry-almond-chicken-salad-lettuce-wraps.html?home-category_id=20  - Hummus Deviled Eggs:  https://www.diabetesfoodhub.org/recipes/deepti-deviled-eggs.html?home-category_id=1  - Lemon Raspberry Coleman Seed  "Pudding:  https://www.diabetesfoodSpotfav [employer].org/recipes/lemon-raspberry-ijeoma-seed-pudding.html?home-category_id=20  - Greek yogurt chocolate mouse: https://www.diabetesfoCE[address].org/recipes/greek-yogurt-chocolate-mousse.html?home-category_id=23  - Broccoli Cheese Bites: https://www.diabetesfoCEPA [address].org/recipes/broccoli-cheese-bites.html?home-category_id=20  - Cheese dip with sliced veggies: https://www.diabetesfoCEPA [address].org/recipes/quick-easy-cheese-dip.html?home-category_id=20    Quick/Easy Protein Sources:  Hard boiled eggs  Part-skim cheese sticks  Baby Bell cheese rounds  Low-fat/low-sugar Greek yogurt  Low-fat cottage cheese  Lean deli meat (chicken/turkey/ham)  Roasted chickpeas or lentils  Nuts   Turkey meat stick  Protein shake/bar  \"P3\" snack (cheese, nuts, deli meat)  Aldi's \"Protein Bread\"   \"Egglife\" egg white wrap    Tuna/salmon can/packet      Non-meat protein Ideas  Quinoa  Eggs  Dairy (Cottage cheese, low fat cheese, greek yogurt)   Nuts  Beans  Lentils  Protein pasta   Nutritional yeast  Garden of life raw meal powder  Liquid aminos  Homemade meats - a taco meat could be made with chopped cauliflower/mushroom as an example   Hummus (could do homemade if preferred)  Hemp hearts   Tofu  Seitan     Complex Carbohydrates high in protein  Quinoa  Brown Rice  Chick Pea  Buckwheat  Sweet Potatoes  Lentils  Legumes  Serra Beans  Black Beans   Farro   Kidney Beans     Faster meal component ideas:   Protein   Crockpot chicken  Rotisserie chicken  Deli meat  Ready made meat from store (Gustavo's meal, [employer])  Cottage cheese  Greek yogurt  String cheese  Scrambled/hard boiled eggs  Canned tuna/chicken/salmon (or pouches)  Frozen, already cooked shrimp  Nuts/Seeds (handful)  Nut Butter (2 tbsp)  Grains  Minute grain cups (lentils, rice, quinoa)   90 second grain pouches  Whole grain bread (i.e. Lucian bread, etc.)  Carb balance tortillas  Quick oats  Whole wheat " crackers  Fruit  Apples  Bananas  Peaches  Pears  Plums  Grapes  Berries (strawberries, blueberries, raspberries, blackberries)  Cherries  Frozen fruit  Fruit canned in 100% fruit juice  Vegetables  Mini cucumbers  Baby carrots  Mini bell peppers  Snap peas  Pre-cut vegetables (i.e. broccoli, brussels sprouts, etc.)  Frozen vegetables (steam in bag)  Canned vegetables (recommend low sodium options)  Other   Hemp hearts, ijeoma, and other seeds      Five Food Groups Pantry List  Vegetables: Keep a variety of canned tomatoes in stock (diced, crushed, whole, stewed). Use them in soups, stews, sauces, casseroles and more! Also,  a jar of your favorite pasta sauce. Dried mushrooms are another great pantry item because they can add depth of flavor to your meals.    Fruits: Dried fruits such as raisins, dried cranberries and dried apricots provide dietary fiber. They also can add a punch of flavor to your morning breakfast, midday salad and dinner grains.    Milk and Dairy Products: Dried milk is a great back-up item to have on stock. You can use it in your coffee or tea. Boxed milk also is available in single-serving packages and is a great item for lunch boxes. Evaporated milk, available in cans in the baking aisle, can be substituted for liquid milk in most recipes.    Protein Foods: Stock up on canned or dried lentils and black, calvert, cannellini, garbanzo and kidney beans. Toss cooked beans in salads, soups, stews and other dishes. Canned tuna, anchovies and sardines.    Grains: Keep a stash of oatmeal, buckwheat and other whole-grain cereals in the pantry. For an extra boost, add nuts and fresh berries to these hot cereals. Barley, farro, quinoa and other grains are staples for healthy meals. Also, keep a variety of rice on hand. Long grain, short grain, basmati and brown rice. Spaghetti, ziti, penne and other pastas are great for an easy, quick and filling meal. Give yourself an extra nutrition boost by buying  whole-grain pasta or trying pasta made from legumes.    Also, stock up on:  Condiments: Ketchup, mustard and relish can be stored in the pantry until opened. Once you open them, keep them in the fridge.  Oil and vinegar: Extra-virgin olive oil is a versatile, heart-healthy option. Other oils, such as peanut, walnut and sesame, add a burst of flavor to meals.  different types of vinegar, such as cider, white and balsamic. Each imparts a unique flavor to your recipes.  Stock: Vegetable, chicken and beef stock are the basics of many recipes. Opt for low-sodium versions or ones with no added salt.  Herbs and spices:  small containers of ground herbs and spices. That way they are as fresh as possible when you use them.  Flax and other seeds: Flax and ijeoma seeds deliver protein, dietary fiber and omega-3 fatty acids. Add them to cereal, salads, sauces and home-baked goods. If you buy whole flaxseed, make sure you grind it up before eating so your body can absorb the nutrients.    Five Food Groups Freezer List  Vegetables:  some of your favorite frozen veggies. Look for packages without sodium. And, while you are in the produce aisle, grab some fresh herbs. When you get home, fill ice cube trays with chopped herbs, top off the herbs with boiling water, and carefully place in the freezer. Add these herb cubes as you prepare meals for a punch of freshness.    Fruits: Stash frozen berries and other fruits in the freezer.     Milk and Dairy Products: Freeze Parmesan and other pre-shredded cheeses -- toss them into soups, stews and pasta dishes.     Protein Foods: Stock up on salmon and other fatty fishes to ensure you have ready access to healthy fats. Lean meats and poultry also store well in the freezer. One tip: Make sure you move it to the refrigerator one day before cooking to give adequate time for defrosting. Keep a variety of nuts in the freezer. This helps prevent them from spoiling. Add them to  cold cereal, salads, hot grains and other dishes.    Grains: Whole-grain corn tortillas freeze well and can be used for quick breakfasts, lunches or dinners. Can t eat that loaf of bread fast enough while it is fresh? Make it a habit to freeze part of the loaf and defrost slices as you need them.    The Plate Method  [email or web address]    Portion Control Without Measuring   [email or web address]?     Protein Sources   [email or web address]     Carbohydrates  [email or web address]     Mindful Eating  [email or web address]     Summary of Volumetrics Eating Plan  [email or web address]     COMPREHENSIVE WEIGHT MANAGEMENT PROGRAM  VIRTUAL SUPPORT GROUPS    At [facility], our Comprehensive Weight Management program offers on-line support groups for patients who are working on weight loss and considering, preparing for, or have had weight loss surgery.     There is no cost for this opportunity.  You are invited to attend the?Virtual Support Groups?provided by any of the following locations:    [facility] via Microsoft Teams with Tabatha Salinas RD, RN  2.   [facility] via IMRSV with Leoncio Glaser, PhD, LP  3.   [facility] via IMRSV with Cathy Skaggs RN  4.   [facility] via a Zoom Meeting with JAZ Sprague    The following Support Group information can also be found on our website:  [email or web address]/treatments/weight-loss-[email or web address]-weight-loss-surgery-support-groups      [facility]   WEIGHT LOSS SURGERY SUPPORT GROUP  The support group is a patient-lead forum that meets monthly to share experiences, encouragement and education. It is open to those who have had weight loss surgery, are scheduled for surgery, or are considering surgery.   WHEN: 3rd Wednesday of each month from 5:00PM - 6:00PM using Microsoft Teams.   FACILITATOR: Led by Tabatha Up RD, LD, RN, the program's Clinical Coordinator.   TO  REGISTER: Please contact the clinic via getupp or call the nurse line directly at [phone] to inform our staff that you would like an invite sent to you and to let us know the email you would like the invite sent to. Prior to the meeting, a link with directions on how to join the meeting will be sent to you.    2025 Meetings, 3rd Wednesday, 5:00pm - 6:00pm    January 15: Let's Talk  February19: Let's Talk  March 19: Speaker: Lenin White RD, LD  April 16: Let's Talk  May 21: Speaker: Nayana Simms RD, LD  June18: Let's Talk  July 16: Let's Talk  August 20: Let's Talk  September 17: No meeting.  October 15: Speaker: Shavonne Gaytan PsyD, LAURO  November 19: Let's Talk  December 17: Let's Talk    [facility] [place] [facility] BARIATRIC CARE SUPPORT GROUP  This is open to all pre- and post- operative bariatric surgery patients as well as their support system.   WHEN: 3rd Tuesday of each month from 6:30 PM - 8:00 PM using Microsoft Teams.   FACILITATOR: Led by Leoncio Glaser, Ph.D who is a Licensed Psychologist with the [facility] Comprehensive Weight Management Program.   TO REGISTER: Please send an email to Leoncio Glaser, Ph.D.,  at?[email or web address]?if you would like an invitation to the group. Prior to the meeting, a link with directions on how to join the meeting will be sent to you.    2025 Meetings, 3rd Tuesday January 21st: Open Forum  February 18th: Medications and Bariatric Surgery  Speaker: Yanet Suárez, [facility] Resident  March 18th: Open Forum  April 15th: Genetics of Obesity as well as Q&A, Speaker: Lana rFench MD, [facility] Comprehensive Weight Management Program.  May 20th: Open Forum  June 17th: Nutritional Labeling, Speaker: MARIJA  July 15th: Open Forum  August 19th: Open Forum  September 16th: Open Forum  October 21st: Open Forum  November 18th: Holiday Eating, Speaker: MARIJA  December 16th: Open Forum    [facility]  [facility] POST-OPERATIVE BARIATRIC SURGERY SUPPORT GROUP  This is a support group for [facility] bariatric patients (and those external to [facility]) who have had bariatric surgery and are at least 3 months post-surgery.  WHEN: 4th Thursday of the month from 11:00 AM - 12:00 PM using Microsoft Teams.   FACILITATOR: Led by Certified Bariatric Nurse, Cathy Skaggs RN, CBN.   TO REGISTER: Please send an email to Cathy at [email or web address] if you would like an invitation to the group.  Prior to the meeting, a link with directions on how to join the meeting will be sent to you.    2025 Meetings, 4th Thursday, 11:00am - 12:pm  January 23  February 27  March 27  April 24  May 22  Abi 26  July 24  August 28  September 25  October 23  November 27: Thanksgiving Day, No meeting.      December 25: Shaina Day, No meeting.        [facility]   HEALTHY LIFESTYLE COACHING GROUP  This is a 60 minute virtual coaching group for those who want to lead a healthier lifestyle. Come together to set goals and overcome barriers in a supportive group environment. We will address the four pillars of health: nutrition, exercise, sleep, and emotional well-being.   WHEN: 1st Friday of the month, 12:30 PM - 1:30 PM   using a Zoom meeting.     FACILITATOR: Led by National Board-Certified Health and , Cathy Malik, [facility].  TO REGISTER: Please call the [employer] at [phone] to register. You will get an appointment to attend in [facility]. Fifteen minutes prior to the meeting, complete the e-check in and you will get the link to join the meeting.  There is no charge to attend this group and space is limited.  Please register for each month you wish to attend    2025 Meetings, 1st Friday, 12:30pm-1:30pm  January 3  February 7  March 7: No meeting.  April 4  May 2  Abi 6  July 4: Fourth of July Holiday, No  meeting.    August 1  September 5  October 3  November 7  December 5

## 2025-01-08 NOTE — PROGRESS NOTES
"Video-Visit Details    Type of service:  Video Visit    Video Start Time: 9:36 AM   Video End Time: 10:12 AM     Originating Location (pt. Location): Home    Distant Location (provider location):  Offsite (providers home) [facility] [place]     Platform used for Video Visit: [email or web address]    New Weight Management Nutrition Consultation    Ame Ayala is a 29 year old female presents today for new weight management nutrition consultation.  Patient referred by YVONNE Samson on 2025.    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 Developed full fledged bulimia in college, was purging after every meal at the time. Also started bipolar disorder medication in college and saw further weight gain.     Treatment with bunny in 2018, no purging since that time. Has had the urge to purge over the last few years but has never engaged in this- knows this is a slippery slope. Currently is working with a therapist, seeing them weekly. Still feels like at times she is struggling with binge eating tendencies, feels she has improved on this over the last few months.     Anthropometrics:  Initial consult weight: 187 lb on 25  Estimated body mass index is 31.25 kg/m  as calculated from the following:    Height as of 25: 1.651 m (5' 5\").    Weight as of 25: 85.2 kg (187 lb 12.8 oz).    Medications for Weight Loss:  Metformin     NUTRITION HISTORY  Food allergies: NKFA  Food intolerances: lactose sensitive - just mindful with how much.   Vitamin/Mineral Supplements: Zinc, Vitamin C, " Vitamin D, Vitamin B12   Previous methods of diet modification for weight loss: watching portions   RD before: Reva dietitian in 2018.     Per Payal's visit: pt typically eats 3 meals a day. Craves salty, crunchy, also loves ice cream, cheese sticks. Is able to get full. Can stay full until next meal, but sometimes will eat out of boredom in between meals. Does struggle with portion control. Does experience food noise . Does experience emotional eating (stress, depression). Does at times a loss of control around eating, estimates this is happening a few times a week. Has been diagnosed with bulimia in the past. No purging since 2018, but still feels she goes through phases of binge eating tendencies.     Eats out/ gets take out 1-2 times   a week. Drinks mostly water, sparkling. Working on reducing pop- partner drinks a lot of pop so this is tough. Right now estimates having regular doctor pepper 2x a week. Coffee with cream and sugar flavoring occasionally, if she's studying- when she's in school maybe 2-3 times a week. 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     Portion control, struggles with binge eating and emotional eating, goal setting.     Likes to be distracted during the eating process but feels this may lead her to overeat or mindless eat.     Diet recall:   Breakfast - smaller, Apple + granola bar if on the go OR if home will make eggs with piece of fruit or toast.   Lunch - 12-1 pm, meal prepped or leftovers OR turkey sandwich with spinach and tomato with couple of chips + raw carrots with ranch.   Dinner - 8-9 PM Two days works over the evening and will go out for food. OR spaghetti, grain + protein + sauce.  Asian Teriyaki with chicken and raul rice. Will eat out with partner 1-2x a week.   Snacks - cheese stick, chips and salsa, veggies with hummus or ranch OR fruit   Hydration: Drinks mostly water, or sparkling water. Working on reducing regular Dr. Pepper to 2x a week. Coffee with cream and sugar flavor  occasionally   Alcohol: None, has been sober for 5 years         1/6/2025    10:44 AM   Diet Recall Review with Patient   If you do eat breakfast, what types of food do you eat? Apple & granola bar, eggs with tomato, yogurt & berries   If you do eat lunch, what types of food do you typically eat? Turkey sandwich with chips & carrots with ranch, burrito bowl, leftovers, pasta   If you do eat supper, what types of food do you typically eat? Chicken & rice, take out or dine out, pasta, pizza, chicken nuggets   If you do snack, what types of food do you typically eat? Chips, raw veggies, cheese sticks, fruit   How many glasses of juice do you drink in a typical day? 0   How many of glasses of milk do you drink in a typical day? 0   How many 8oz glasses of sugar containing drinks such as Tawanda-Aid/sweet tea do you drink in a day? 0   How many cans/bottles of sugar pop/soda/tea/sports drinks do you drink in a day? 0.5   How many cans/bottles of diet pop/soda/tea or sports drink do you drink in a day? 0   How often do you have a drink of alcohol? Never           1/6/2025    10:44 AM   Eating Habits   Generally, my meals include foods like these bread, pasta, rice, potatoes, corn, crackers, sweet dessert, pop, or juice Almost Everyday   Generally, my meals include foods like these fried meats, brats, burgers, french fries, pizza, cheese, chips, or ice cream A Few Times a Week   Eat fast food (like McDonalds, Burger Waqas, Taco Bell) Less Than Weekly   Eat at a buffet or sit-down restaurant Less Than Weekly   Eat most of my meals in front of the TV or computer Everyday   Often skip meals, eat at random times, have no regular eating times A Few Times a Week   Rarely sit down for a meal but snack or graze throughout Less Than Weekly   Eat extra snacks between meals A Few Times a Week   Eat most of my food at the end of the day A Few Times a Week   Eat in the middle of the night or wake up at night to eat Less Than Weekly   Eat  extra snacks to prevent or correct low blood sugar Never   Eat to prevent acid reflux or stomach pain A Few Times a Week   Worry about not having enough food to eat Less Than Weekly   I eat when I am depressed Everyday   I eat when I am stressed Almost Everyday   I eat when I am bored Almost Everyday   I eat when I am anxious A Few Times a Week   I eat when I am happy or as a reward A Few Times a Week   I feel hungry all the time even if I just have eaten Once a Week   Feeling full is important to me Once a Week   I finish all the food on my plate even if I am already full Almost Everyday   I can't resist eating delicious food or walk past the good food/smell Once a Week   I eat/snack without noticing that I am eating Less Than Weekly   I eat when I am preparing the meal Less Than Weekly   I eat more than usual when I see others eating A Few Times a Week   I have trouble not eating sweets, ice cream, cookies, or chips if they are around the house Almost Everyday   I think about food all day A Few Times a Week   What foods, if any, do you crave? Chips/Crackers   Please list any other foods you crave? Cheese, fruit, ice cream, french fries           1/6/2025    10:44 AM   Amount of Food   I feel out of control when eating Almost Everyday   I eat a large amount of food, like a loaf of bread, a box of cookies, a pint/quart of ice cream, all at once Monthly   I eat a large amount of food even when I am not hungry Weekly   I eat rapidly Weekly   I eat alone because I feel embarrassed and do not want others to see how much I have eaten Monthly   I eat until I am uncomfortably full Weekly   I feel bad, disgusted, or guilty after I overeat Almost Everyday       Physical Activity:  Goes to the gym 2-3x a week to get intentional exercise - weight lifting and treadmill. Pickleball once a week.         1/6/2025    10:44 AM   Activity/Exercise History   How much of a typical 12 hour day do you spend sitting? Half the Day   How  much of a typical 12 hour day do you spend lying down? Less Than Half the Day   How much of a typical day do you spend walking/standing? Half the Day   How many hours (not including work) do you spend on the TV/Video Games/Computer/Tablet/Phone? 2-3 Hours   How many times a week are you active for the purpose of exercise? 2-3 Times a Week   What keeps you from being more active? Pain    Lack of Time    Too tired   How many total minutes do you spend doing some activity for the purpose of exercising when you exercise? More Than 30 Minutes     Nutrition Prescription  Recommended energy/nutrient modification.    Nutrition Diagnosis  Obesity r/t long history of positive energy balance aeb BMI >30.    Nutrition Intervention  Materials/education provided on hypocaloric diet for weight loss. Discussed benefits of eating consistently throughout the day and not skipping meals, prioritizing protein and fiber in meals. Volumetric eating to help satiety level on fewer calories; portion control and healthy food choices (Plate Method and Volumetrics handouts), lower calorie snack choices, meal and snack planning tips and resources. Patient demonstrates understanding. Co-developed goals to work towards. Provided pt with list of goals and resources below via Vaionit.     Expected Engagement: good    Nutrition Goals  1) Try to not go longer than 4 hours without eating some food.   2) Aim to have a good source of protein with each eating episode  3) Aim for 60-90 grams of protein daily.   4) Increase fiber rich foods in your diet by having more fruits, vegetables, and whole grains  5) Try to reduce sugar/calorie containing beverages.     Limit/avoid snacking as able. If snack is needed use lean protein and/or high-fiber foods. Examples:   - 2 cup popcorn   - 1 cup mixed berries   - 15 almonds, walnuts, cashews   - carrot/celery sticks and 2 tbsp low-fat ranch   - 1 hard boiled egg   - Part-skim mozzarella cheese stick   - Low-fat,  "low-sugar greek yogurt with 1/2 cup berries   - Med apple or pear   - sliced bell peppers with 1/2 cup salsa   - 1/2 cup roasted chickpeas   - sliced cucumbers with vinegar    100-Calorie Snack List: [email or web address]    100 calorie sweets: Smart Sweets, Dr. Thurston's Xylitol candy, Fiber One desserts, Fit and Active 100 calorie snack sweets at [employer]; [employer] 100 calorie pre-portioned cookies, sugar-free pudding, sugar-free jello.    Snack Recipes:  - Roasted chickpeas: https://www.diabetesfoodhub.org/recipes/roasted-and-spiced-chickpeas.html?home-category_id=23  - Black bean hummus with carrot and celery sticks: https://www.diabetesfoodhub.org/recipes/black-bean-hummus.html?home-category_id=23  - Chicken Satay with peanut sauce:  https://www.diabetesfoodhub.org/recipes/blueberry-almond-chicken-salad-lettuce-wraps.html?home-category_id=20  - Hummus Deviled Eggs:  https://www.diabetesfoodhub.org/recipes/hummus-deviled-eggs.html?home-category_id=1  - Lemon Raspberry Ijeoma Seed Pudding:  https://www.diabetesfoodhub.org/recipes/lemon-raspberry-ijeoma-seed-pudding.html?home-category_id=20  - Greek yogurt chocolate mouse: https://www.diabetesfoodhub.org/recipes/greek-yogurt-chocolate-mousse.html?home-category_id=23  - Broccoli Cheese Bites: https://www.diabetesfoodhub.org/recipes/broccoli-cheese-bites.html?home-category_id=20  - Cheese dip with sliced veggies: https://www.diabetesfoodhub.org/recipes/quick-easy-cheese-dip.html?home-category_id=20    Quick/Easy Protein Sources:  Hard boiled eggs  Part-skim cheese sticks  Baby Bell cheese rounds  Low-fat/low-sugar Greek yogurt  Low-fat cottage cheese  Lean deli meat (chicken/turkey/ham)  Roasted chickpeas or lentils  Nuts   Turkey meat stick  Protein shake/bar  \"P3\" snack (cheese, nuts, deli meat)  [employer]'s \"Protein Bread\"   \"Egglife\" egg white wrap    Tuna/salmon can/packet      Non-meat protein Ideas  Quinoa  Eggs  Dairy (Cottage cheese, low fat cheese, greek " yogurt)   Nuts  Beans  Lentils  Protein pasta   Nutritional yeast  Garden of life raw meal powder  Liquid aminos  Homemade meats - a taco meat could be made with chopped cauliflower/mushroom as an example   Hummus (could do homemade if preferred)  Hemp hearts   Tofu  Seitan     Complex Carbohydrates high in protein  Quinoa  Brown Rice  Chick Pea  Buckwheat  Sweet Potatoes  Lentils  Legumes  Serra Beans  Black Beans   Farro   Kidney Beans     Faster meal component ideas:   Protein   Crockpot chicken  Rotisserie chicken  Deli meat  Ready made meat from store (One[employer]el, [employer]ather)  Cottage cheese  Greek yogurt  String cheese  Scrambled/hard boiled eggs  Canned tuna/chicken/salmon (or pouches)  Frozen, already cooked shrimp  Nuts/Seeds (handful)  Nut Butter (2 tbsp)  Grains  Minute grain cups (lentils, rice, quinoa)   90 second grain pouches  Whole grain bread (i.e. Lucian bread, etc.)  Carb balance tortillas  Quick oats  Whole wheat crackers  Fruit  Apples  Bananas  Peaches  Pears  Plums  Grapes  Berries (strawberries, blueberries, raspberries, blackberries)  Cherries  Frozen fruit  Fruit canned in 100% fruit juice  Vegetables  Mini cucumbers  Baby carrots  Mini bell peppers  Snap peas  Pre-cut vegetables (i.e. broccoli, brussels sprouts, etc.)  Frozen vegetables (steam in bag)  Canned vegetables (recommend low sodium options)  Other   Hemp hearts, ijeoma, and other seeds      Five Food Groups Pantry List  Vegetables: Keep a variety of canned tomatoes in stock (diced, crushed, whole, stewed). Use them in soups, stews, sauces, casseroles and more! Also,  a jar of your favorite pasta sauce. Dried mushrooms are another great pantry item because they can add depth of flavor to your meals.    Fruits: Dried fruits such as raisins, dried cranberries and dried apricots provide dietary fiber. They also can add a punch of flavor to your morning breakfast, midday salad and dinner grains.    Milk and Dairy Products:  Dried milk is a great back-up item to have on stock. You can use it in your coffee or tea. Boxed milk also is available in single-serving packages and is a great item for lunch boxes. Evaporated milk, available in cans in the baking aisle, can be substituted for liquid milk in most recipes.    Protein Foods: Stock up on canned or dried lentils and black, calvert, cannellini, garbanzo and kidney beans. Toss cooked beans in salads, soups, stews and other dishes. Canned tuna, anchovies and sardines.    Grains: Keep a stash of oatmeal, buckwheat and other whole-grain cereals in the pantry. For an extra boost, add nuts and fresh berries to these hot cereals. Barley, farro, quinoa and other grains are staples for healthy meals. Also, keep a variety of rice on hand. Long grain, short grain, basmati and brown rice. Spaghetti, ziti, penne and other pastas are great for an easy, quick and filling meal. Give yourself an extra nutrition boost by buying whole-grain pasta or trying pasta made from legumes.    Also, stock up on:  Condiments: Ketchup, mustard and relish can be stored in the pantry until opened. Once you open them, keep them in the fridge.  Oil and vinegar: Extra-virgin olive oil is a versatile, heart-healthy option. Other oils, such as peanut, walnut and sesame, add a burst of flavor to meals.  different types of vinegar, such as cider, white and balsamic. Each imparts a unique flavor to your recipes.  Stock: Vegetable, chicken and beef stock are the basics of many recipes. Opt for low-sodium versions or ones with no added salt.  Herbs and spices:  small containers of ground herbs and spices. That way they are as fresh as possible when you use them.  Flax and other seeds: Flax and ijeoma seeds deliver protein, dietary fiber and omega-3 fatty acids. Add them to cereal, salads, sauces and home-baked goods. If you buy whole flaxseed, make sure you grind it up before eating so your body can absorb the  nutrients.    Five Food Groups Freezer List  Vegetables:  some of your favorite frozen veggies. Look for packages without sodium. And, while you are in the produce aisle, grab some fresh herbs. When you get home, fill ice cube trays with chopped herbs, top off the herbs with boiling water, and carefully place in the freezer. Add these herb cubes as you prepare meals for a punch of freshness.    Fruits: Stash frozen berries and other fruits in the freezer.     Milk and Dairy Products: Freeze Parmesan and other pre-shredded cheeses -- toss them into soups, stews and pasta dishes.     Protein Foods: Stock up on salmon and other fatty fishes to ensure you have ready access to healthy fats. Lean meats and poultry also store well in the freezer. One tip: Make sure you move it to the refrigerator one day before cooking to give adequate time for defrosting. Keep a variety of nuts in the freezer. This helps prevent them from spoiling. Add them to cold cereal, salads, hot grains and other dishes.    Grains: Whole-grain corn tortillas freeze well and can be used for quick breakfasts, lunches or dinners. Can t eat that loaf of bread fast enough while it is fresh? Make it a habit to freeze part of the loaf and defrost slices as you need them.    The Plate Method  [email or web address]    Portion Control Without Measuring   [email or web address]?     Protein Sources   [email or web address]     Carbohydrates  [email or web address]     Mindful Eating  [email or web address]     Summary of Volumetrics Eating Plan  [email or web address]     Follow-Up: February 12     Time spent with patient: 36 minutes.  Michelle Gonzales RD, MORA

## 2025-01-08 NOTE — NURSING NOTE
Current patient location: home    Is the patient currently in the state of [place]? YES    Visit mode:VIDEO    If the visit is dropped, the patient can be reconnected by: VIDEO VISIT: Text to cell phone:   Telephone Information:   Mobile [phone]       Will anyone else be joining the visit? NO  (If patient encounters technical issues they should call [phone]    Are changes needed to the allergy or medication list? N/A    Are refills needed on medications prescribed by this physician? NO    Rooming Documentation:  Not applicable      Reason for visit: Consult    Wt other than 24 hrs:  Monday  Pain more than one location:  no  Celia FUCHS

## 2025-01-20 ENCOUNTER — APPOINTMENT (OUTPATIENT)
Dept: LAB | Facility: CLINIC | Age: 30
End: 2025-01-20
Payer: COMMERCIAL

## 2025-01-20 LAB
ALBUMIN SERPL BCG-MCNC: 4.5 G/DL (ref 3.5–5.2)
ALP SERPL-CCNC: 63 U/L (ref 40–150)
ALT SERPL W P-5'-P-CCNC: 20 U/L (ref 0–50)
ANION GAP SERPL CALCULATED.3IONS-SCNC: 8 MMOL/L (ref 7–15)
AST SERPL W P-5'-P-CCNC: 23 U/L (ref 0–45)
BILIRUB SERPL-MCNC: 0.8 MG/DL
BUN SERPL-MCNC: 13 MG/DL (ref 6–20)
CALCIUM SERPL-MCNC: 9.2 MG/DL (ref 8.8–10.4)
CHLORIDE SERPL-SCNC: 107 MMOL/L (ref 98–107)
CHOLEST SERPL-MCNC: 144 MG/DL
CREAT SERPL-MCNC: 0.84 MG/DL (ref 0.51–0.95)
EGFRCR SERPLBLD CKD-EPI 2021: >90 ML/MIN/1.73M2
EST. AVERAGE GLUCOSE BLD GHB EST-MCNC: 111 MG/DL
FASTING STATUS PATIENT QL REPORTED: YES
FASTING STATUS PATIENT QL REPORTED: YES
GLUCOSE SERPL-MCNC: 92 MG/DL (ref 70–99)
HBA1C MFR BLD: 5.5 %
HCO3 SERPL-SCNC: 25 MMOL/L (ref 22–29)
HDLC SERPL-MCNC: 53 MG/DL
LDLC SERPL CALC-MCNC: 77 MG/DL
NONHDLC SERPL-MCNC: 91 MG/DL
POTASSIUM SERPL-SCNC: 4.2 MMOL/L (ref 3.4–5.3)
PROT SERPL-MCNC: 7.3 G/DL (ref 6.4–8.3)
PTH-INTACT SERPL-MCNC: 68 PG/ML (ref 15–65)
SODIUM SERPL-SCNC: 140 MMOL/L (ref 135–145)
TRIGL SERPL-MCNC: 71 MG/DL
VIT D+METAB SERPL-MCNC: 44 NG/ML (ref 20–50)

## 2025-01-20 PROCEDURE — 99000 SPECIMEN HANDLING OFFICE-LAB: CPT | Performed by: PATHOLOGY

## 2025-01-20 PROCEDURE — 83036 HEMOGLOBIN GLYCOSYLATED A1C: CPT

## 2025-01-20 PROCEDURE — 82306 VITAMIN D 25 HYDROXY: CPT

## 2025-01-22 DIAGNOSIS — E66.811 CLASS 1 OBESITY WITH SERIOUS COMORBIDITY AND BODY MASS INDEX (BMI) OF 30.0 TO 30.9 IN ADULT, UNSPECIFIED OBESITY TYPE: Primary | ICD-10-CM

## 2025-01-22 DIAGNOSIS — E21.3 HYPERPARATHYROIDISM: ICD-10-CM

## 2025-01-29 ENCOUNTER — PRE VISIT (OUTPATIENT)
Dept: PLASTIC SURGERY | Facility: CLINIC | Age: 30
End: 2025-01-29

## 2025-02-10 NOTE — PROGRESS NOTES
"Video-Visit Details    Type of service:  Video Visit    Video Start Time: 9:30 AM  Video End Time: 10:01 AM     Originating Location (pt. Location): Home    Distant Location (provider location):  Offsite (providers home) [facility] [place]     Platform used for Video Visit: [employer] Raven and Stag    Return Weight Management Nutrition Consultation    Ame Ayala is a 29 year old female presents today for return weight management nutrition consultation.  Patient referred by YVONNE Samson on 2025.    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 Developed full fledged bulimia in college, was purging after every meal at the time. Also started bipolar disorder medication in college and saw further weight gain.     Treatment with bunny in 2018, no purging since that time. Has had the urge to purge over the last few years but has never engaged in this- knows this is a slippery slope. Currently is working with a therapist, seeing them weekly. Still feels like at times she is struggling with binge eating tendencies, feels she has improved on this over the last few months.     Anthropometrics:  Initial consult weight: 187 lb on 25  Estimated body mass index is 30.79 kg/m  as calculated from the following:    Height as of this encounter: 1.651 m (5' 5\").    Weight as of 25: 83.9 kg (185 lb).  Current Weight: 185 lb     Medications for Weight Loss:  Metformin     NUTRITION HISTORY  Food allergies: NKFA  Food intolerances: lactose sensitive - just mindful with how much.   Vitamin/Mineral " Supplements: Zinc, Vitamin C, Vitamin D, Vitamin B12   Previous methods of diet modification for weight loss: watching portions   RD before: Reva dietitian in 2018.     Per Payal's visit: pt typically eats 3 meals a day. Craves salty, crunchy, also loves ice cream, cheese sticks. Is able to get full. Can stay full until next meal, but sometimes will eat out of boredom in between meals. Does struggle with portion control. Does experience food noise . Does experience emotional eating (stress, depression). Does at times a loss of control around eating, estimates this is happening a few times a week. Has been diagnosed with bulimia in the past. No purging since 2018, but still feels she goes through phases of binge eating tendencies.     Eats out/ gets take out 1-2 times   a week. Drinks mostly water, sparkling. Working on reducing pop- partner drinks a lot of pop so this is tough. Right now estimates having regular doctor pepper 2x a week. Coffee with cream and sugar flavoring occasionally, if she's studying- when she's in school maybe 2-3 times a week. 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     Portion control, struggles with binge eating and emotional eating, goal setting.     Likes to be distracted during the eating process but feels this may lead her to overeat or mindless eat.     Diet recall:   Breakfast - smaller, Apple + granola bar if on the go OR if home will make eggs with piece of fruit or toast.   Lunch - 12-1 pm, meal prepped or leftovers OR turkey sandwich with spinach and tomato with couple of chips + raw carrots with ranch.   Dinner - 8-9 PM Two days works over the evening and will go out for food. OR spaghetti, grain + protein + sauce.  Asian Teriyaki with chicken and raul rice. Will eat out with partner 1-2x a week.   Snacks - cheese stick, chips and salsa, veggies with hummus or ranch OR fruit   Hydration: Drinks mostly water, or sparkling water. Working on reducing regular Dr. Pepper to 2x a week.  Coffee with cream and sugar flavor occasionally   Alcohol: None, has been sober for 5 years     February 2025: Feels she was doing a really good job for 3 weeks right after our last visit but then started school and that through her off track. Patient downloaded a protein tracking estelle and found at baseline she was averaging 60 grams most days. She felt it wasn't hard to increase this to 70 grams but has to work a lot harder to get to 90 grams. She feels she is still dealing with a lot of food noise, expressed some interest in maybe trying a different medication. She is going to reach out to [place] about this.     She admits she still feels like she is eating too large of portions. She is wondering if she should purchase a food scale.     Patient has cut down on her Dr. Pepper consumption.     Has been exercising more frequently, getting in some strength training type of workouts.     Frustrated that despite good efforts on making lifestyle and diet changes continues to struggle to lose weight.     Progress Towards Previous Goals   1) Try to not go longer than 4 hours without eating some food. - met, continues   2) Aim to have a good source of protein with each eating episode - met, continues   3) Aim for 60-90 grams of protein daily. - met  4) Increase fiber rich foods in your diet by having more fruits, vegetables, and whole grains - didn't discuss   5) Try to reduce sugar/calorie containing beverages. met         1/6/2025    10:44 AM   Diet Recall Review with Patient   If you do eat breakfast, what types of food do you eat? Apple & granola bar, eggs with tomato, yogurt & berries   If you do eat lunch, what types of food do you typically eat? Turkey sandwich with chips & carrots with ranch, burrito bowl, leftovers, pasta   If you do eat supper, what types of food do you typically eat? Chicken & rice, take out or dine out, pasta, pizza, chicken nuggets   If you do snack, what types of food do you typically eat?  Chips, raw veggies, cheese sticks, fruit   How many glasses of juice do you drink in a typical day? 0   How many of glasses of milk do you drink in a typical day? 0   How many 8oz glasses of sugar containing drinks such as Tawanda-Aid/sweet tea do you drink in a day? 0   How many cans/bottles of sugar pop/soda/tea/sports drinks do you drink in a day? 0.5   How many cans/bottles of diet pop/soda/tea or sports drink do you drink in a day? 0   How often do you have a drink of alcohol? Never           1/6/2025    10:44 AM   Eating Habits   Generally, my meals include foods like these bread, pasta, rice, potatoes, corn, crackers, sweet dessert, pop, or juice Almost Everyday   Generally, my meals include foods like these fried meats, brats, burgers, french fries, pizza, cheese, chips, or ice cream A Few Times a Week   Eat fast food (like McDonalds, [employer], [employer] Bell) Less Than Weekly   Eat at a buffet or sit-down restaurant Less Than Weekly   Eat most of my meals in front of the TV or computer Everyday   Often skip meals, eat at random times, have no regular eating times A Few Times a Week   Rarely sit down for a meal but snack or graze throughout Less Than Weekly   Eat extra snacks between meals A Few Times a Week   Eat most of my food at the end of the day A Few Times a Week   Eat in the middle of the night or wake up at night to eat Less Than Weekly   Eat extra snacks to prevent or correct low blood sugar Never   Eat to prevent acid reflux or stomach pain A Few Times a Week   Worry about not having enough food to eat Less Than Weekly   I eat when I am depressed Everyday   I eat when I am stressed Almost Everyday   I eat when I am bored Almost Everyday   I eat when I am anxious A Few Times a Week   I eat when I am happy or as a reward A Few Times a Week   I feel hungry all the time even if I just have eaten Once a Week   Feeling full is important to me Once a Week   I finish all the food on my plate even if I am  already full Almost Everyday   I can't resist eating delicious food or walk past the good food/smell Once a Week   I eat/snack without noticing that I am eating Less Than Weekly   I eat when I am preparing the meal Less Than Weekly   I eat more than usual when I see others eating A Few Times a Week   I have trouble not eating sweets, ice cream, cookies, or chips if they are around the house Almost Everyday   I think about food all day A Few Times a Week   What foods, if any, do you crave? Chips/Crackers   Please list any other foods you crave? Cheese, fruit, ice cream, french fries           1/6/2025    10:44 AM   Amount of Food   I feel out of control when eating Almost Everyday   I eat a large amount of food, like a loaf of bread, a box of cookies, a pint/quart of ice cream, all at once Monthly   I eat a large amount of food even when I am not hungry Weekly   I eat rapidly Weekly   I eat alone because I feel embarrassed and do not want others to see how much I have eaten Monthly   I eat until I am uncomfortably full Weekly   I feel bad, disgusted, or guilty after I overeat Almost Everyday       Physical Activity:  Goes to the gym 2-3x a week to get intentional exercise - weight lifting and treadmill. Pickleball once a week.         1/6/2025    10:44 AM   Activity/Exercise History   How much of a typical 12 hour day do you spend sitting? Half the Day   How much of a typical 12 hour day do you spend lying down? Less Than Half the Day   How much of a typical day do you spend walking/standing? Half the Day   How many hours (not including work) do you spend on the TV/Video Games/Computer/Tablet/Phone? 2-3 Hours   How many times a week are you active for the purpose of exercise? 2-3 Times a Week   What keeps you from being more active? Pain    Lack of Time    Too tired   How many total minutes do you spend doing some activity for the purpose of exercising when you exercise? More Than 30 Minutes     Nutrition  Prescription  Recommended energy/nutrient modification.    Nutrition Diagnosis  Obesity r/t long history of positive energy balance aeb BMI >30.    Nutrition Intervention  Materials/education provided on hypocaloric diet for weight loss.  Reviewed progress towards previous goals.  Praised pt on progress she has made.   Discussed potential barriers and strategies to overcome. Could consider purchasing a food scale if you feel it could be a helpful tool for estimating portions of certain foods.   Encouraged continued physical activity with emphasis on strengthening as able.   Patient demonstrates understanding.  Co-developed goals to work towards.   Provided pt with list of goals and resources below via [employer].     Expected Engagement: good    Nutrition Goals  1) Try to not go longer than 4 hours without eating some food.   2) Aim to have a good source of protein with each eating episode  3) Aim for 70-90 grams of protein daily.   4) Consider purchasing a food scale if you feel it could be a helpful tool for monitoring portions.   5) Look for some additional lists of high protein foods online that may be a helpful tool for you     Protein Sources   [email or web address]     Healthier Sweet alternatives:               - Yasso bars               - Frozen yogurt ice cream alternatives               - [employer] brownie alternatives               - Could consider black bean, sweet potato or avocado based brownies               - Trail mix              - Homemade or store bought mousse              - Dessert hummus with fruit              - Chocolate covered fruit/nuts               - Yogurt covered fruit/nuts     Follow-Up: 2-3 months     Time spent with patient: 31 minutes.  Michelle Gonzales RD, LD

## 2025-02-12 ENCOUNTER — VIRTUAL VISIT (OUTPATIENT)
Dept: ENDOCRINOLOGY | Facility: CLINIC | Age: 30
End: 2025-02-12
Payer: COMMERCIAL

## 2025-02-12 VITALS — BODY MASS INDEX: 30.79 KG/M2 | HEIGHT: 65 IN

## 2025-02-12 DIAGNOSIS — Z71.3 NUTRITIONAL COUNSELING: Primary | ICD-10-CM

## 2025-02-12 DIAGNOSIS — E66.811 CLASS 1 OBESITY WITH SERIOUS COMORBIDITY AND BODY MASS INDEX (BMI) OF 30.0 TO 30.9 IN ADULT, UNSPECIFIED OBESITY TYPE: ICD-10-CM

## 2025-02-12 PROCEDURE — 97803 MED NUTRITION INDIV SUBSEQ: CPT | Mod: 95

## 2025-02-12 PROCEDURE — 99207 PR NO CHARGE LOS: CPT | Mod: 95

## 2025-02-12 ASSESSMENT — PAIN SCALES - GENERAL: PAINLEVEL_OUTOF10: NO PAIN (0)

## 2025-02-12 NOTE — NURSING NOTE
Current patient location:  [place], [place]    Is the patient currently in the state of [place]? YES    Visit mode: VIDEO    If the visit is dropped, the patient can be reconnected by:VIDEO VISIT: Text to cell phone:   Telephone Information:   Mobile [phone]       Will anyone else be joining the visit? NO  (If patient encounters technical issues they should call [phone]    Are changes needed to the allergy or medication list? N/A    Are refills needed on medications prescribed by this physician? NO    Rooming Documentation:  Questionnaire(s) not pre-assigned    Reason for visit: RECHECK    Pt states no weight available within last week.Thinks around 185#    Oleg FUCHS

## 2025-02-12 NOTE — LETTER
2025       RE: Ame Ayala  [address]  [place] [address]     Dear Colleague,    Thank you for referring your patient, Ame Ayala, to the [facility]NA[facility] [place] at [facility]. Please see a copy of my visit note below.    Video-Visit Details    Type of service:  Video Visit    Video Start Time: 9:30 AM  Video End Time: 10:01 AM     Originating Location (pt. Location): Home    Distant Location (provider location):  Offsite (providers home) [facility]NA[facility] [place]     Platform used for Video Visit: [employer]    Return Weight Management Nutrition Consultation    Ame Ayala is a 29 year old female presents today for return weight management nutrition consultation.  Patient referred by YVONNE Samson on 2025.    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 Developed full fledged bulimia in college, was purging after every meal at the time. Also started bipolar disorder medication in college and saw further weight gain.     Treatment with bunny in 2018, no purging since that time. Has had the urge to purge over the last few years but has never engaged in this- knows this is a slippery slope. Currently is working with a therapist, seeing them weekly. Still feels like at times she is struggling with binge eating tendencies, feels she has improved on this over the last few months.     Anthropometrics:  Initial consult weight: 187 lb on 25  Estimated body mass index is 30.79 kg/m   "as calculated from the following:    Height as of this encounter: 1.651 m (5' 5\").    Weight as of 1/8/25: 83.9 kg (185 lb).  Current Weight: 185 lb     Medications for Weight Loss:  Metformin     NUTRITION HISTORY  Food allergies: NKFA  Food intolerances: lactose sensitive - just mindful with how much.   Vitamin/Mineral Supplements: Zinc, Vitamin C, Vitamin D, Vitamin B12   Previous methods of diet modification for weight loss: watching portions   RD before: Reva dietitian in 2018.     Per Payal's visit: pt typically eats 3 meals a day. Craves salty, crunchy, also loves ice cream, cheese sticks. Is able to get full. Can stay full until next meal, but sometimes will eat out of boredom in between meals. Does struggle with portion control. Does experience food noise . Does experience emotional eating (stress, depression). Does at times a loss of control around eating, estimates this is happening a few times a week. Has been diagnosed with bulimia in the past. No purging since 2018, but still feels she goes through phases of binge eating tendencies.     Eats out/ gets take out 1-2 times   a week. Drinks mostly water, sparkling. Working on reducing pop- partner drinks a lot of pop so this is tough. Right now estimates having regular doctor pepper 2x a week. Coffee with cream and sugar flavoring occasionally, if she's studying- when she's in school maybe 2-3 times a week. 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     Portion control, struggles with binge eating and emotional eating, goal setting.     Likes to be distracted during the eating process but feels this may lead her to overeat or mindless eat.     Diet recall:   Breakfast - smaller, Apple + granola bar if on the go OR if home will make eggs with piece of fruit or toast.   Lunch - 12-1 pm, meal prepped or leftovers OR turkey sandwich with spinach and tomato with couple of chips + raw carrots with ranch.   Dinner - 8-9 PM Two days works over the evening and will go out for " food. OR spaghetti, grain + protein + sauce.  Asian Teriyaki with chicken and raul rice. Will eat out with partner 1-2x a week.   Snacks - cheese stick, chips and salsa, veggies with hummus or ranch OR fruit   Hydration: Drinks mostly water, or sparkling water. Working on reducing regular Dr. Pepper to 2x a week. Coffee with cream and sugar flavor occasionally   Alcohol: None, has been sober for 5 years     February 2025: Feels she was doing a really good job for 3 weeks right after our last visit but then started school and that through her off track. Patient downloaded a protein tracking estelle and found at baseline she was averaging 60 grams most days. She felt it wasn't hard to increase this to 70 grams but has to work a lot harder to get to 90 grams. She feels she is still dealing with a lot of food noise, expressed some interest in maybe trying a different medication. She is going to reach out to [facility] about this.     She admits she still feels like she is eating too large of portions. She is wondering if she should purchase a food scale.     Patient has cut down on her Dr. Pepper consumption.     Has been exercising more frequently, getting in some strength training type of workouts.     Frustrated that despite good efforts on making lifestyle and diet changes continues to struggle to lose weight.     Progress Towards Previous Goals   1) Try to not go longer than 4 hours without eating some food. - met, continues   2) Aim to have a good source of protein with each eating episode - met, continues   3) Aim for 60-90 grams of protein daily. - met  4) Increase fiber rich foods in your diet by having more fruits, vegetables, and whole grains - didn't discuss   5) Try to reduce sugar/calorie containing beverages. met         1/6/2025    10:44 AM   Diet Recall Review with Patient   If you do eat breakfast, what types of food do you eat? Apple & granola bar, eggs with tomato, yogurt & berries   If you do eat lunch,  what types of food do you typically eat? Turkey sandwich with chips & carrots with ranch, burrito bowl, leftovers, pasta   If you do eat supper, what types of food do you typically eat? Chicken & rice, take out or dine out, pasta, pizza, chicken nuggets   If you do snack, what types of food do you typically eat? Chips, raw veggies, cheese sticks, fruit   How many glasses of juice do you drink in a typical day? 0   How many of glasses of milk do you drink in a typical day? 0   How many 8oz glasses of sugar containing drinks such as Tawanda-Aid/sweet tea do you drink in a day? 0   How many cans/bottles of sugar pop/soda/tea/sports drinks do you drink in a day? 0.5   How many cans/bottles of diet pop/soda/tea or sports drink do you drink in a day? 0   How often do you have a drink of alcohol? Never           1/6/2025    10:44 AM   Eating Habits   Generally, my meals include foods like these bread, pasta, rice, potatoes, corn, crackers, sweet dessert, pop, or juice Almost Everyday   Generally, my meals include foods like these fried meats, brats, burgers, french fries, pizza, cheese, chips, or ice cream A Few Times a Week   Eat fast food (like McDonalds, Burger Waqas, Taco Bell) Less Than Weekly   Eat at a buffet or sit-down restaurant Less Than Weekly   Eat most of my meals in front of the TV or computer Everyday   Often skip meals, eat at random times, have no regular eating times A Few Times a Week   Rarely sit down for a meal but snack or graze throughout Less Than Weekly   Eat extra snacks between meals A Few Times a Week   Eat most of my food at the end of the day A Few Times a Week   Eat in the middle of the night or wake up at night to eat Less Than Weekly   Eat extra snacks to prevent or correct low blood sugar Never   Eat to prevent acid reflux or stomach pain A Few Times a Week   Worry about not having enough food to eat Less Than Weekly   I eat when I am depressed Everyday   I eat when I am stressed Almost  Everyday   I eat when I am bored Almost Everyday   I eat when I am anxious A Few Times a Week   I eat when I am happy or as a reward A Few Times a Week   I feel hungry all the time even if I just have eaten Once a Week   Feeling full is important to me Once a Week   I finish all the food on my plate even if I am already full Almost Everyday   I can't resist eating delicious food or walk past the good food/smell Once a Week   I eat/snack without noticing that I am eating Less Than Weekly   I eat when I am preparing the meal Less Than Weekly   I eat more than usual when I see others eating A Few Times a Week   I have trouble not eating sweets, ice cream, cookies, or chips if they are around the house Almost Everyday   I think about food all day A Few Times a Week   What foods, if any, do you crave? Chips/Crackers   Please list any other foods you crave? Cheese, fruit, ice cream, french fries           1/6/2025    10:44 AM   Amount of Food   I feel out of control when eating Almost Everyday   I eat a large amount of food, like a loaf of bread, a box of cookies, a pint/quart of ice cream, all at once Monthly   I eat a large amount of food even when I am not hungry Weekly   I eat rapidly Weekly   I eat alone because I feel embarrassed and do not want others to see how much I have eaten Monthly   I eat until I am uncomfortably full Weekly   I feel bad, disgusted, or guilty after I overeat Almost Everyday       Physical Activity:  Goes to the gym 2-3x a week to get intentional exercise - weight lifting and treadmill. Pickleball once a week.         1/6/2025    10:44 AM   Activity/Exercise History   How much of a typical 12 hour day do you spend sitting? Half the Day   How much of a typical 12 hour day do you spend lying down? Less Than Half the Day   How much of a typical day do you spend walking/standing? Half the Day   How many hours (not including work) do you spend on the TV/Video Games/Computer/Tablet/Phone? 2-3 Hours    How many times a week are you active for the purpose of exercise? 2-3 Times a Week   What keeps you from being more active? Pain    Lack of Time    Too tired   How many total minutes do you spend doing some activity for the purpose of exercising when you exercise? More Than 30 Minutes     Nutrition Prescription  Recommended energy/nutrient modification.    Nutrition Diagnosis  Obesity r/t long history of positive energy balance aeb BMI >30.    Nutrition Intervention  Materials/education provided on hypocaloric diet for weight loss.  Reviewed progress towards previous goals.  Praised pt on progress she has made.   Discussed potential barriers and strategies to overcome. Could consider purchasing a food scale if you feel it could be a helpful tool for estimating portions of certain foods.   Encouraged continued physical activity with emphasis on strengthening as able.   Patient demonstrates understanding.  Co-developed goals to work towards.   Provided pt with list of goals and resources below via [employer].     Expected Engagement: good    Nutrition Goals  1) Try to not go longer than 4 hours without eating some food.   2) Aim to have a good source of protein with each eating episode  3) Aim for 70-90 grams of protein daily.   4) Consider purchasing a food scale if you feel it could be a helpful tool for monitoring portions.   5) Look for some additional lists of high protein foods online that may be a helpful tool for you     Protein Sources   [email or web address]     Healthier Sweet alternatives:               - Yasso bars               - Frozen yogurt ice cream alternatives               - Nature s Bakery brownie alternatives               - Could consider black bean, sweet potato or avocado based brownies               - Trail mix              - Homemade or store bought mousse              - Dessert hummus with fruit              - Chocolate covered fruit/nuts               - Yogurt covered fruit/nuts      Follow-Up: 2-3 months     Time spent with patient: 31 minutes.  Michelle Gonzales RD, LD      Again, thank you for allowing me to participate in the care of your patient.      Sincerely,    Michelle Gonzales RD

## 2025-02-12 NOTE — PATIENT INSTRUCTIONS
Adams Mccloud,     Follow-up with RD in 2-3 months.     Thank you,    Michelle Gonzales RD, LD  If you would like to schedule or reschedule an appointment with the RD, please call [phone]    Nutrition Goals  1) Try to not go longer than 4 hours without eating some food.   2) Aim to have a good source of protein with each eating episode  3) Aim for 70-90 grams of protein daily.   4) Consider purchasing a food scale if you feel it could be a helpful tool for monitoring portions.   5) Look for some additional lists of high protein foods online that may be a helpful tool for you     Protein Sources   [email or web address]     Healthier Sweet alternatives:               - Yasso bars               - Frozen yogurt ice cream alternatives               - Nature s Bakery brownie alternatives               - Could consider black bean, sweet potato or avocado based brownies               - Trail mix              - Homemade or store bought mousse              - Dessert hummus with fruit              - Chocolate covered fruit/nuts               - Yogurt covered fruit/nuts     COMPREHENSIVE WEIGHT MANAGEMENT PROGRAM  VIRTUAL SUPPORT GROUPS    At [facility], our Comprehensive Weight Management program offers on-line support groups for patients who are working on weight loss and considering, preparing for, or have had weight loss surgery.     There is no cost for this opportunity.  You are invited to attend the?Virtual Support Groups?provided by any of the following locations:    [facility] via Wote Teams with Tabatha Salinas RD, RN  2.   [facility] via Wote Teams with Leoncio Glaser, PhD, LP  3.   [facility] via Wote Teams with Cathy Skaggs RN  4.   [facility] via a Zoom Meeting with SALOMÓN Sprague-    The following Support Group information can also be found on our website:  [email or web address]      [facility]  [facility]   WEIGHT LOSS SURGERY SUPPORT GROUP  The support group is a patient-lead forum that meets monthly to share experiences, encouragement and education. It is open to those who have had weight loss surgery, are scheduled for surgery, or are considering surgery.   WHEN: 3rd Wednesday of each month from 5:00PM - 6:00PM using Microsoft Teams.   FACILITATOR: Led by Tabatha Up RD, MORA, RN, the program's Clinical Coordinator.   TO REGISTER: Please contact the clinic via [employer] or call the nurse line directly at [phone] to inform our staff that you would like an invite sent to you and to let us know the email you would like the invite sent to. Prior to the meeting, a link with directions on how to join the meeting will be sent to you.    2025 Meetings, 3rd Wednesday, 5:00pm - 6:00pm    January 15: Let's Talk  February19: Let's Talk  March 19: Speaker: Lenin White RD, MORA  April 16: Let's Talk  May 21: Speaker: Nayana Simms RD, LD  June18: Let's Talk  July 16: Let's Talk  August 20: Let's Talk  September 17: No meeting.  October 15: Speaker: Shavonne Gaytan PsyD, LP  November 19: Let's Talk  December 17: Let's Talk    [facility] BARIATRIC CARE SUPPORT GROUP  This is open to all pre- and post- operative bariatric surgery patients as well as their support system.   WHEN: 3rd Tuesday of each month from 6:30 PM - 8:00 PM using Microsoft Teams.   FACILITATOR: Led by Leoncio Glaser, Ph.D who is a Licensed Psychologist with the [facility] Comprehensive Weight Management Program.   TO REGISTER: Please send an email to Leoncio Glaser, Ph.D., LP at?[email or web address]?if you would like an invitation to the group. Prior to the meeting, a link with directions on how to join the meeting will be sent to you.    2025 Meetings, 3rd Tuesday January 21st: Open Forum  February 18th: Medications and Bariatric Surgery  Speaker: [facility]  Pharmacy Resident  March 18th: Open Forum  April 15th: Genetics of Obesity as well as Q&A, Speaker: Lana French MD, [facility] Comprehensive Weight Management Program.  May 20th: Open Forum  June 17th: Nutritional Labeling, Speaker: MARIJA  July 15th: Open Forum  August 19th: Open Forum  September 16th: Open Forum  October 21st: Open Forum  November 18th: Holiday Eating, Speaker: MARIJA  December 16th: Open Forum    [facility] POST-OPERATIVE BARIATRIC SURGERY SUPPORT GROUP  This is a support group for [facility] bariatric patients (and those external to [facility]) who have had bariatric surgery and are at least 3 months post-surgery.  WHEN: 4th Thursday of the month from 11:00 AM - 12:00 PM using Microsoft Teams.   FACILITATOR: Led by Certified Bariatric Nurse, Cathy Skaggs RN, CBN.   TO REGISTER: Please send an email to Cathy at [email or web address] if you would like an invitation to the group.  Prior to the meeting, a link with directions on how to join the meeting will be sent to you.    2025 Meetings, 4th Thursday, 11:00am - 12:pm  January 23  February 27  March 27  April 24  May 22  Abi 26  July 24  August 28  September 25  October 23  November 27: Thanksgiving Day, No meeting.      December 25: Shaina Day, No meeting.        [facility]   HEALTHY LIFESTYLE COACHING GROUP  This is a 60 minute virtual coaching group for those who want to lead a healthier lifestyle. Come together to set goals and overcome barriers in a supportive group environment. We will address the four pillars of health: nutrition, exercise, sleep, and emotional well-being.   WHEN: 1st Friday of the month, 12:30 PM - 1:30 PM   using a Zoom meeting.     FACILITATOR: Led by National Board-Certified Health and , KAMLESH Sprague[facility].  TO REGISTER: Please call the [employer] at [phone]  to register. You will get an appointment to attend in [employer]. Fifteen minutes prior to the meeting, complete the e-check in and you will get the link to join the meeting.  There is no charge to attend this group and space is limited.  Please register for each month you wish to attend    2025 Meetings, 1st Friday, 12:30pm-1:30pm  January 3  February 7  March 7: No meeting.  April 4  May 2  Abi 6  July 4: Fourth of July Holiday, No meeting.    August 1  September 5  October 3  November 7  December 5

## 2025-03-26 ENCOUNTER — TELEPHONE (OUTPATIENT)
Dept: ENDOCRINOLOGY | Facility: CLINIC | Age: 30
End: 2025-03-26
Payer: COMMERCIAL

## 2025-03-26 NOTE — TELEPHONE ENCOUNTER
Left Voicemail (2nd Attempt) and Sent [email or web address] (2nd Attempt) for the patient to call back and schedule the following:    Appointment type: Return Weight Management  Appointment mode: In Person or Virtual Visit  Provider: Ema Toussaint NP, Mabel Matos PA-C, or Aminah Hamilton PA-C  Return date: Approx. Next available  Specialty phone number:  [phone]     Additional Notes:   Reschedule 4/11/25 visit with Payal Baeza with another provider.

## 2025-04-29 ENCOUNTER — NURSE TRIAGE (OUTPATIENT)
Dept: NURSING | Facility: CLINIC | Age: 30
End: 2025-04-29

## 2025-04-29 ENCOUNTER — OFFICE VISIT (OUTPATIENT)
Dept: OPHTHALMOLOGY | Facility: CLINIC | Age: 30
End: 2025-04-29
Payer: COMMERCIAL

## 2025-04-29 DIAGNOSIS — H10.212 CHEMICAL CONJUNCTIVITIS OF LEFT EYE: Primary | ICD-10-CM

## 2025-04-29 RX ORDER — ATOMOXETINE 25 MG/1
CAPSULE ORAL
COMMUNITY
Start: 2025-04-19

## 2025-04-29 ASSESSMENT — CONF VISUAL FIELD
OS_NORMAL: 1
OS_SUPERIOR_NASAL_RESTRICTION: 0
OD_INFERIOR_NASAL_RESTRICTION: 0
OD_INFERIOR_TEMPORAL_RESTRICTION: 0
OD_NORMAL: 1
OD_SUPERIOR_TEMPORAL_RESTRICTION: 0
OS_SUPERIOR_TEMPORAL_RESTRICTION: 0
OD_SUPERIOR_NASAL_RESTRICTION: 0
OS_INFERIOR_NASAL_RESTRICTION: 0
OS_INFERIOR_TEMPORAL_RESTRICTION: 0

## 2025-04-29 ASSESSMENT — EXTERNAL EXAM - RIGHT EYE: OD_EXAM: NORMAL

## 2025-04-29 ASSESSMENT — VISUAL ACUITY
OD_SC+: -1
OD_SC: 20/20
OS_SC: 20/20
METHOD: SNELLEN - LINEAR

## 2025-04-29 ASSESSMENT — SLIT LAMP EXAM - LIDS
COMMENTS: NORMAL
COMMENTS: NORMAL

## 2025-04-29 ASSESSMENT — TONOMETRY
OD_IOP_MMHG: 19
OS_IOP_MMHG: 19
IOP_METHOD: TONOPEN

## 2025-04-29 ASSESSMENT — EXTERNAL EXAM - LEFT EYE: OS_EXAM: NORMAL

## 2025-04-29 NOTE — TELEPHONE ENCOUNTER
Redness persisting today following  in eye last night  Slight blurriness    Scheduled at 1320 with Dr. Quach today at [facility] location    Confirmed appt and recommended starting preservative free tears every 1-2 hours and may use more often until visit.    Pt seemed comfortable with scheduling/plan.    Neel Dunn RN 7:44 AM 04/29/25
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
## 2025-04-29 NOTE — NURSING NOTE
"Chief Complaints and History of Present Illnesses   Patient presents with    Red Eye Left Eye     Patient reports she got 409  in left eye last night.      Chief Complaint(s) and History of Present Illness(es)       Red Eye Left Eye              Laterality: left eye    Associated symptoms: foreign body sensation    Pain scale: 1/10    Comments: Patient reports she got 409  in left eye last night.               Comments    Flushed for 15 minutes last night and could feel it coming out of nose.   Red, irritated left eye since. Vision is a little blurry with left eye. Some issues some light sensitivity and glare glare OS. Makes her feel as though she is getting a slight headache. She states did not sleep well and feel that may contribute though as well.   Feels like there is a \" goober or mucus in left eye that needs to be wiped away\". Eye movement gives the sensation that something is in left eye.     History includes status post laser retinopexy OS 10/28/21 and HSK OS        Used lubricants about 1.5 hours ago and this helped reduce some redness of left eye today.   Jacy Dsouza, COT COT 1:26 PM April 29, 2025                          "

## 2025-04-29 NOTE — NURSING NOTE
"Chief Complaints and History of Present Illnesses   Patient presents with    Red Eye Left Eye     Patient reports she got 409  in left eye last night.      Chief Complaint(s) and History of Present Illness(es)       Red Eye Left Eye              Laterality: left eye    Associated symptoms: foreign body sensation    Pain scale: 1/10    Comments: Patient reports she got 409  in left eye last night.               Comments    Flushed for 15 minutes last night and could feel it coming out of nose.   Red, irritated left eye since. Vision is a little blurry with left eye. Some issues some light sensitivity and glare glare OS. Makes her feel as though she is getting a slight headache. She states did not sleep well and feel that may contribute though as well.   Feels like there is a \" goober or mucus in left eye that needs to be wiped away\". Eye movement gives the sensation that something is in left eye.     Used lubricants about 1.5 hours ago and this helped reduce some redness of left eye today.   Jacy Dsouza, COT COT 1:26 PM April 29, 2025                          "

## 2025-05-15 DIAGNOSIS — Z13.9 ENCOUNTER FOR SCREENING: ICD-10-CM

## 2025-05-15 DIAGNOSIS — Z13.21 ENCOUNTER FOR VITAMIN DEFICIENCY SCREENING: ICD-10-CM

## 2025-05-15 DIAGNOSIS — Z79.899 HIGH RISK MEDICATION USE: Primary | ICD-10-CM

## 2025-05-28 ENCOUNTER — TRANSFERRED RECORDS (OUTPATIENT)
Dept: HEALTH INFORMATION MANAGEMENT | Facility: CLINIC | Age: 30
End: 2025-05-28

## 2025-05-28 ENCOUNTER — LAB (OUTPATIENT)
Dept: LAB | Facility: CLINIC | Age: 30
End: 2025-05-28
Payer: COMMERCIAL

## 2025-05-28 DIAGNOSIS — E21.3 HYPERPARATHYROIDISM: ICD-10-CM

## 2025-05-28 DIAGNOSIS — Z13.9 ENCOUNTER FOR SCREENING: ICD-10-CM

## 2025-05-28 DIAGNOSIS — Z13.21 ENCOUNTER FOR VITAMIN DEFICIENCY SCREENING: ICD-10-CM

## 2025-05-28 DIAGNOSIS — Z79.899 HIGH RISK MEDICATION USE: ICD-10-CM

## 2025-05-28 LAB
ALBUMIN SERPL BCG-MCNC: 4.6 G/DL (ref 3.5–5.2)
ALP SERPL-CCNC: 60 U/L (ref 40–150)
ALT SERPL W P-5'-P-CCNC: 19 U/L (ref 0–50)
ANION GAP SERPL CALCULATED.3IONS-SCNC: 12 MMOL/L (ref 7–15)
AST SERPL W P-5'-P-CCNC: 24 U/L (ref 0–45)
BILIRUB SERPL-MCNC: 0.6 MG/DL
BUN SERPL-MCNC: 8.2 MG/DL (ref 6–20)
CALCIUM SERPL-MCNC: 9.6 MG/DL (ref 8.8–10.4)
CALCIUM SERPL-MCNC: 9.6 MG/DL (ref 8.8–10.4)
CHLORIDE SERPL-SCNC: 103 MMOL/L (ref 98–107)
CREAT SERPL-MCNC: 0.76 MG/DL (ref 0.51–0.95)
EGFRCR SERPLBLD CKD-EPI 2021: >90 ML/MIN/1.73M2
GLUCOSE SERPL-MCNC: 88 MG/DL (ref 70–99)
HCO3 SERPL-SCNC: 23 MMOL/L (ref 22–29)
POTASSIUM SERPL-SCNC: 4.1 MMOL/L (ref 3.4–5.3)
PROT SERPL-MCNC: 7.4 G/DL (ref 6.4–8.3)
PTH-INTACT SERPL-MCNC: 43 PG/ML (ref 15–65)
SODIUM SERPL-SCNC: 138 MMOL/L (ref 135–145)

## 2025-05-28 PROCEDURE — 82607 VITAMIN B-12: CPT | Performed by: NURSE PRACTITIONER

## 2025-05-28 PROCEDURE — 82306 VITAMIN D 25 HYDROXY: CPT | Performed by: NURSE PRACTITIONER

## 2025-05-28 PROCEDURE — 99000 SPECIMEN HANDLING OFFICE-LAB: CPT | Performed by: PATHOLOGY

## 2025-05-28 PROCEDURE — 80175 DRUG SCREEN QUAN LAMOTRIGINE: CPT | Mod: 90 | Performed by: PATHOLOGY

## 2025-05-28 PROCEDURE — 36415 COLL VENOUS BLD VENIPUNCTURE: CPT | Performed by: PATHOLOGY

## 2025-05-28 PROCEDURE — 80053 COMPREHEN METABOLIC PANEL: CPT | Performed by: PATHOLOGY

## 2025-05-28 PROCEDURE — 83970 ASSAY OF PARATHORMONE: CPT | Performed by: PATHOLOGY

## 2025-05-29 ENCOUNTER — RESULTS FOLLOW-UP (OUTPATIENT)
Dept: ENDOCRINOLOGY | Facility: CLINIC | Age: 30
End: 2025-05-29

## 2025-05-29 LAB
VIT B12 SERPL-MCNC: 795 PG/ML (ref 232–1245)
VIT D+METAB SERPL-MCNC: 46 NG/ML (ref 20–50)

## 2025-06-19 NOTE — PROGRESS NOTES
"Video-Visit Details    Type of service:  Video Visit    Video Start Time: 2:02 PM   Video End Time: 2:28 PM     Originating Location (pt. Location): Home    Distant Location (provider location):  Offsite (providers home) [facility] [place]     Platform used for Video Visit: [employer]    Return Weight Management Nutrition Consultation    Ame Ayala is a 29 year old female presents today for return weight management nutrition consultation.  Patient referred by YVONNE Samson on 2025.    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 Developed full fledged bulimia in college, was purging after every meal at the time. Also started bipolar disorder medication in college and saw further weight gain.     Treatment with bunny in 2018, no purging since that time. Has had the urge to purge over the last few years but has never engaged in this- knows this is a slippery slope. Currently is working with a therapist, seeing them weekly. Still feels like at times she is struggling with binge eating tendencies, feels she has improved on this over the last few months.     Anthropometrics:  Initial consult weight: 187 lb on 25  Estimated body mass index is 29.62 kg/m  as calculated from the following:    Height as of this encounter: 1.651 m (5' 5\").    Weight as of 25: 80.7 kg (178 lb).  Current Weight: 178 lb, -9 lbs since initial consult      Wt Readings from Last 5 Encounters:   25 80.7 kg (178 lb)   25 83.9 kg (185 lb)   25 85.2 kg (187 lb 12.8 oz)   24 " 86.2 kg (190 lb)   08/12/24 90.7 kg (200 lb)     Medications for Weight Loss:  Metformin - no longer taking     NUTRITION HISTORY  Food allergies: NKFA  Food intolerances: lactose sensitive - just mindful with how much.   Vitamin/Mineral Supplements: Zinc, Vitamin C, Vitamin D, Vitamin B12   Previous methods of diet modification for weight loss: watching portions   RD before: Reva dietitian in 2018.     Per Payal's visit: pt typically eats 3 meals a day. Craves salty, crunchy, also loves ice cream, cheese sticks. Is able to get full. Can stay full until next meal, but sometimes will eat out of boredom in between meals. Does struggle with portion control. Does experience food noise . Does experience emotional eating (stress, depression). Does at times a loss of control around eating, estimates this is happening a few times a week. Has been diagnosed with bulimia in the past. No purging since 2018, but still feels she goes through phases of binge eating tendencies.     Eats out/ gets take out 1-2 times   a week. Drinks mostly water, sparkling. Working on reducing pop- partner drinks a lot of pop so this is tough. Right now estimates having regular doctor pepper 2x a week. Coffee with cream and sugar flavoring occasionally, if she's studying- when she's in school maybe 2-3 times a week. 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     Portion control, struggles with binge eating and emotional eating, goal setting.     Likes to be distracted during the eating process but feels this may lead her to overeat or mindless eat.     Diet recall:   Breakfast - smaller, Apple + granola bar if on the go OR if home will make eggs with piece of fruit or toast.   Lunch - 12-1 pm, meal prepped or leftovers OR turkey sandwich with spinach and tomato with couple of chips + raw carrots with ranch.   Dinner - 8-9 PM Two days works over the evening and will go out for food. OR spaghetti, grain + protein + sauce.  Asian Teriyaki with chicken and  raul rice. Will eat out with partner 1-2x a week.   Snacks - cheese stick, chips and salsa, veggies with hummus or ranch OR fruit   Hydration: Drinks mostly water, or sparkling water. Working on reducing regular Dr. Pepper to 2x a week. Coffee with cream and sugar flavor occasionally   Alcohol: None, has been sober for 5 years     February 2025: Feels she was doing a really good job for 3 weeks right after our last visit but then started school and that through her off track. Patient downloaded a protein tracking estelle and found at baseline she was averaging 60 grams most days. She felt it wasn't hard to increase this to 70 grams but has to work a lot harder to get to 90 grams. She feels she is still dealing with a lot of food noise, expressed some interest in maybe trying a different medication. She is going to reach out to [facility] about this.     She admits she still feels like she is eating too large of portions. She is wondering if she should purchase a food scale.     Patient has cut down on her Dr. Pepper consumption.     Has been exercising more frequently, getting in some strength training type of workouts.     Frustrated that despite good efforts on making lifestyle and diet changes continues to struggle to lose weight.     June 2025: Has added a lot more physical activity, riding her bike to and from her work. Has been meal prepping and making more home cooked meals.  Started on an ADHD medication which has also been helping with appetite significantly. Feels like her binge episodes are pretty well controlled. Struggles at times to make sure she is eating every 4 hours, discussed through some strategies to try to help with it.      Feels she still gets sweet cravings after dinner.     Progress Towards Previous Goals   1) Try to not go longer than 4 hours without eating some food. - continues   2) Aim to have a good source of protein with each eating episode - continues   3) Aim for 70-90 grams of protein  daily. Continues   4) Consider purchasing a food scale if you feel it could be a helpful tool for monitoring portions. - not met, no longer relevant   5) Look for some additional lists of high protein foods online that may be a helpful tool for you - met        1/6/2025    10:44 AM   Diet Recall Review with Patient   If you do eat breakfast, what types of food do you eat? Apple & granola bar, eggs with tomato, yogurt & berries   If you do eat lunch, what types of food do you typically eat? Turkey sandwich with chips & carrots with ranch, burrito bowl, leftovers, pasta   If you do eat supper, what types of food do you typically eat? Chicken & rice, take out or dine out, pasta, pizza, chicken nuggets   If you do snack, what types of food do you typically eat? Chips, raw veggies, cheese sticks, fruit   How many glasses of juice do you drink in a typical day? 0   How many of glasses of milk do you drink in a typical day? 0   How many 8oz glasses of sugar containing drinks such as Tawanda-Aid/sweet tea do you drink in a day? 0   How many cans/bottles of sugar pop/soda/tea/sports drinks do you drink in a day? 0.5   How many cans/bottles of diet pop/soda/tea or sports drink do you drink in a day? 0   How often do you have a drink of alcohol? Never           1/6/2025    10:44 AM   Eating Habits   Generally, my meals include foods like these bread, pasta, rice, potatoes, corn, crackers, sweet dessert, pop, or juice Almost Everyday   Generally, my meals include foods like these fried meats, brats, burgers, french fries, pizza, cheese, chips, or ice cream A Few Times a Week   Eat fast food (like McDonalds, Burger [employer]qas, Taco Bell) Less Than Weekly   Eat at a buffet or sit-down restaurant Less Than Weekly   Eat most of my meals in front of the TV or computer Everyday   Often skip meals, eat at random times, have no regular eating times A Few Times a Week   Rarely sit down for a meal but snack or graze throughout Less Than Weekly    Eat extra snacks between meals A Few Times a Week   Eat most of my food at the end of the day A Few Times a Week   Eat in the middle of the night or wake up at night to eat Less Than Weekly   Eat extra snacks to prevent or correct low blood sugar Never   Eat to prevent acid reflux or stomach pain A Few Times a Week   Worry about not having enough food to eat Less Than Weekly   I eat when I am depressed Everyday   I eat when I am stressed Almost Everyday   I eat when I am bored Almost Everyday   I eat when I am anxious A Few Times a Week   I eat when I am happy or as a reward A Few Times a Week   I feel hungry all the time even if I just have eaten Once a Week   Feeling full is important to me Once a Week   I finish all the food on my plate even if I am already full Almost Everyday   I can't resist eating delicious food or walk past the good food/smell Once a Week   I eat/snack without noticing that I am eating Less Than Weekly   I eat when I am preparing the meal Less Than Weekly   I eat more than usual when I see others eating A Few Times a Week   I have trouble not eating sweets, ice cream, cookies, or chips if they are around the house Almost Everyday   I think about food all day A Few Times a Week   What foods, if any, do you crave? Chips/Crackers   Please list any other foods you crave? Cheese, fruit, ice cream, french fries           1/6/2025    10:44 AM   Amount of Food   I feel out of control when eating Almost Everyday   I eat a large amount of food, like a loaf of bread, a box of cookies, a pint/quart of ice cream, all at once Monthly   I eat a large amount of food even when I am not hungry Weekly   I eat rapidly Weekly   I eat alone because I feel embarrassed and do not want others to see how much I have eaten Monthly   I eat until I am uncomfortably full Weekly   I feel bad, disgusted, or guilty after I overeat Almost Everyday       Physical Activity:  Goes to the gym 2-3x a week to get intentional  exercise - weight lifting and treadmill. Pickleball once a week.         1/6/2025    10:44 AM   Activity/Exercise History   How much of a typical 12 hour day do you spend sitting? Half the Day   How much of a typical 12 hour day do you spend lying down? Less Than Half the Day   How much of a typical day do you spend walking/standing? Half the Day   How many hours (not including work) do you spend on the TV/Video Games/Computer/Tablet/Phone? 2-3 Hours   How many times a week are you active for the purpose of exercise? 2-3 Times a Week   What keeps you from being more active? Pain    Lack of Time    Too tired   How many total minutes do you spend doing some activity for the purpose of exercising when you exercise? More Than 30 Minutes     Nutrition Prescription  Recommended energy/nutrient modification.    Nutrition Diagnosis  Overweight r/t long history of positive energy balance aeb BMI >25.    Nutrition Intervention  Materials/education provided on hypocaloric diet for weight loss.  Reviewed progress towards previous goals.  Praised pt on progress she has made.   Discussed potential barriers and strategies to overcome. Discussed some ways to help remember to eat every 4 hours.   Encouraged continued physical activity with emphasis on strengthening as able.   Patient demonstrates understanding.  Co-developed goals to work towards.   Provided pt with list of goals and resources below via [employer].     Expected Engagement: good    Nutrition Goals  1) Try to not go longer than 4 hours without eating some food.   2) Aim to have a good source of protein with each eating episode  3) Aim for 90+ grams of protein daily.    -Check out chat gpt for some new recipe ideas or even high protein food ideas    Healthier Sweet alternatives:               - Yasso bars               - Frozen yogurt ice cream alternatives               - [employer] brownie alternatives               - Could consider black bean, sweet potato or  avocado based brownies               - Trail mix              - Homemade or store bought mousse              - Dessert hummus with fruit              - Chocolate covered fruit/nuts               - Yogurt covered fruit/nuts     Follow-Up: as needed.     Time spent with patient: 26 minutes.  Michelle Gonzales RD, LD

## 2025-06-20 ENCOUNTER — RESULTS FOLLOW-UP (OUTPATIENT)
Dept: INTERNAL MEDICINE | Facility: CLINIC | Age: 30
End: 2025-06-20

## 2025-06-23 ENCOUNTER — VIRTUAL VISIT (OUTPATIENT)
Dept: ENDOCRINOLOGY | Facility: CLINIC | Age: 30
End: 2025-06-23
Payer: COMMERCIAL

## 2025-06-23 VITALS — HEIGHT: 65 IN | BODY MASS INDEX: 29.62 KG/M2

## 2025-06-23 DIAGNOSIS — E66.3 OVERWEIGHT: ICD-10-CM

## 2025-06-23 DIAGNOSIS — Z71.3 NUTRITIONAL COUNSELING: Primary | ICD-10-CM

## 2025-06-23 PROCEDURE — 99207 PR NO CHARGE LOS: CPT | Mod: 95

## 2025-06-23 PROCEDURE — 1126F AMNT PAIN NOTED NONE PRSNT: CPT | Mod: 95

## 2025-06-23 PROCEDURE — 97803 MED NUTRITION INDIV SUBSEQ: CPT | Mod: 95

## 2025-06-23 ASSESSMENT — PAIN SCALES - GENERAL: PAINLEVEL_OUTOF10: NO PAIN (0)

## 2025-06-23 NOTE — LETTER
2025       RE: Ame Ayala  [address]  [place] [address]     Dear Colleague,    Thank you for referring your patient, Ame Ayala, to the [facility] [place] at [facility]. Please see a copy of my visit note below.    Video-Visit Details    Type of service:  Video Visit    Video Start Time: 2:02 PM   Video End Time: 2:28 PM     Originating Location (pt. Location): Home    Distant Location (provider location):  Offsite (providers home) [facility] [place]     Platform used for Video Visit: [employer]    Return Weight Management Nutrition Consultation    Ame Ayala is a 29 year old female presents today for return weight management nutrition consultation.  Patient referred by YVONNE Samson on 2025.    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 Developed full fledged bulimia in college, was purging after every meal at the time. Also started bipolar disorder medication in college and saw further weight gain.     Treatment with bunny in 2018, no purging since that time. Has had the urge to purge over the last few years but has never engaged in this- knows this is a slippery slope. Currently is working with a therapist, seeing them weekly. Still feels like at times she is struggling with binge eating tendencies, feels she has improved on this over the last few months.     Anthropometrics:  Initial consult weight: 187 lb on 25  Estimated body mass index is 29.62 kg/m   "as calculated from the following:    Height as of this encounter: 1.651 m (5' 5\").    Weight as of 5/23/25: 80.7 kg (178 lb).  Current Weight: 178 lb, -9 lbs since initial consult      Wt Readings from Last 5 Encounters:   05/23/25 80.7 kg (178 lb)   01/08/25 83.9 kg (185 lb)   01/06/25 85.2 kg (187 lb 12.8 oz)   09/22/24 86.2 kg (190 lb)   08/12/24 90.7 kg (200 lb)     Medications for Weight Loss:  Metformin - no longer taking     NUTRITION HISTORY  Food allergies: NKFA  Food intolerances: lactose sensitive - just mindful with how much.   Vitamin/Mineral Supplements: Zinc, Vitamin C, Vitamin D, Vitamin B12   Previous methods of diet modification for weight loss: watching portions   RD before: Reva dietitian in 2018.     Per Payal's visit: pt typically eats 3 meals a day. Craves salty, crunchy, also loves ice cream, cheese sticks. Is able to get full. Can stay full until next meal, but sometimes will eat out of boredom in between meals. Does struggle with portion control. Does experience food noise . Does experience emotional eating (stress, depression). Does at times a loss of control around eating, estimates this is happening a few times a week. Has been diagnosed with bulimia in the past. No purging since 2018, but still feels she goes through phases of binge eating tendencies.     Eats out/ gets take out 1-2 times   a week. Drinks mostly water, sparkling. Working on reducing pop- partner drinks a lot of pop so this is tough. Right now estimates having regular doctor pepper 2x a week. Coffee with cream and sugar flavoring occasionally, if she's studying- when she's in school maybe 2-3 times a week. 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     Portion control, struggles with binge eating and emotional eating, goal setting.     Likes to be distracted during the eating process but feels this may lead her to overeat or mindless eat.     Diet recall:   Breakfast - smaller, Apple + granola bar if on the go OR if home will make " eggs with piece of fruit or toast.   Lunch - 12-1 pm, meal prepped or leftovers OR turkey sandwich with spinach and tomato with couple of chips + raw carrots with ranch.   Dinner - 8-9 PM Two days works over the evening and will go out for food. OR spaghetti, grain + protein + sauce.  Asian Teriyaki with chicken and raul rice. Will eat out with partner 1-2x a week.   Snacks - cheese stick, chips and salsa, veggies with hummus or ranch OR fruit   Hydration: Drinks mostly water, or sparkling water. Working on reducing regular Dr. Pepper to 2x a week. Coffee with cream and sugar flavor occasionally   Alcohol: None, has been sober for 5 years     February 2025: Feels she was doing a really good job for 3 weeks right after our last visit but then started school and that through her off track. Patient downloaded a protein tracking estelle and found at baseline she was averaging 60 grams most days. She felt it wasn't hard to increase this to 70 grams but has to work a lot harder to get to 90 grams. She feels she is still dealing with a lot of food noise, expressed some interest in maybe trying a different medication. She is going to reach out to [place] about this.     She admits she still feels like she is eating too large of portions. She is wondering if she should purchase a food scale.     Patient has cut down on her Dr. Pepper consumption.     Has been exercising more frequently, getting in some strength training type of workouts.     Frustrated that despite good efforts on making lifestyle and diet changes continues to struggle to lose weight.     June 2025: Has added a lot more physical activity, riding her bike to and from her work. Has been meal prepping and making more home cooked meals.  Started on an ADHD medication which has also been helping with appetite significantly. Feels like her binge episodes are pretty well controlled. Struggles at times to make sure she is eating every 4 hours, discussed through some  strategies to try to help with it.      Feels she still gets sweet cravings after dinner.     Progress Towards Previous Goals   1) Try to not go longer than 4 hours without eating some food. - continues   2) Aim to have a good source of protein with each eating episode - continues   3) Aim for 70-90 grams of protein daily. Continues   4) Consider purchasing a food scale if you feel it could be a helpful tool for monitoring portions. - not met, no longer relevant   5) Look for some additional lists of high protein foods online that may be a helpful tool for you - met        1/6/2025    10:44 AM   Diet Recall Review with Patient   If you do eat breakfast, what types of food do you eat? Apple & granola bar, eggs with tomato, yogurt & berries   If you do eat lunch, what types of food do you typically eat? Turkey sandwich with chips & carrots with ranch, burrito bowl, leftovers, pasta   If you do eat supper, what types of food do you typically eat? Chicken & rice, take out or dine out, pasta, pizza, chicken nuggets   If you do snack, what types of food do you typically eat? Chips, raw veggies, cheese sticks, fruit   How many glasses of juice do you drink in a typical day? 0   How many of glasses of milk do you drink in a typical day? 0   How many 8oz glasses of sugar containing drinks such as Tawanda-Aid/sweet tea do you drink in a day? 0   How many cans/bottles of sugar pop/soda/tea/sports drinks do you drink in a day? 0.5   How many cans/bottles of diet pop/soda/tea or sports drink do you drink in a day? 0   How often do you have a drink of alcohol? Never           1/6/2025    10:44 AM   Eating Habits   Generally, my meals include foods like these bread, pasta, rice, potatoes, corn, crackers, sweet dessert, pop, or juice Almost Everyday   Generally, my meals include foods like these fried meats, brats, burgers, french fries, pizza, cheese, chips, or ice cream A Few Times a Week   Eat fast food (like Doroth[employer]Piyush  Waqas, [employer]) Less Than Weekly   Eat at a buffet or sit-down restaurant Less Than Weekly   Eat most of my meals in front of the TV or computer Everyday   Often skip meals, eat at random times, have no regular eating times A Few Times a Week   Rarely sit down for a meal but snack or graze throughout Less Than Weekly   Eat extra snacks between meals A Few Times a Week   Eat most of my food at the end of the day A Few Times a Week   Eat in the middle of the night or wake up at night to eat Less Than Weekly   Eat extra snacks to prevent or correct low blood sugar Never   Eat to prevent acid reflux or stomach pain A Few Times a Week   Worry about not having enough food to eat Less Than Weekly   I eat when I am depressed Everyday   I eat when I am stressed Almost Everyday   I eat when I am bored Almost Everyday   I eat when I am anxious A Few Times a Week   I eat when I am happy or as a reward A Few Times a Week   I feel hungry all the time even if I just have eaten Once a Week   Feeling full is important to me Once a Week   I finish all the food on my plate even if I am already full Almost Everyday   I can't resist eating delicious food or walk past the good food/smell Once a Week   I eat/snack without noticing that I am eating Less Than Weekly   I eat when I am preparing the meal Less Than Weekly   I eat more than usual when I see others eating A Few Times a Week   I have trouble not eating sweets, ice cream, cookies, or chips if they are around the house Almost Everyday   I think about food all day A Few Times a Week   What foods, if any, do you crave? Chips/Crackers   Please list any other foods you crave? Cheese, fruit, ice cream, french fries           1/6/2025    10:44 AM   Amount of Food   I feel out of control when eating Almost Everyday   I eat a large amount of food, like a loaf of bread, a box of cookies, a pint/quart of ice cream, all at once Monthly   I eat a large amount of food even when I am not hungry  Weekly   I eat rapidly Weekly   I eat alone because I feel embarrassed and do not want others to see how much I have eaten Monthly   I eat until I am uncomfortably full Weekly   I feel bad, disgusted, or guilty after I overeat Almost Everyday       Physical Activity:  Goes to the gym 2-3x a week to get intentional exercise - weight lifting and treadmill. Pickleball once a week.         1/6/2025    10:44 AM   Activity/Exercise History   How much of a typical 12 hour day do you spend sitting? Half the Day   How much of a typical 12 hour day do you spend lying down? Less Than Half the Day   How much of a typical day do you spend walking/standing? Half the Day   How many hours (not including work) do you spend on the TV/Video Games/Computer/Tablet/Phone? 2-3 Hours   How many times a week are you active for the purpose of exercise? 2-3 Times a Week   What keeps you from being more active? Pain    Lack of Time    Too tired   How many total minutes do you spend doing some activity for the purpose of exercising when you exercise? More Than 30 Minutes     Nutrition Prescription  Recommended energy/nutrient modification.    Nutrition Diagnosis  Overweight r/t long history of positive energy balance aeb BMI >25.    Nutrition Intervention  Materials/education provided on hypocaloric diet for weight loss.  Reviewed progress towards previous goals.  Praised pt on progress she has made.   Discussed potential barriers and strategies to overcome. Discussed some ways to help remember to eat every 4 hours.   Encouraged continued physical activity with emphasis on strengthening as able.   Patient demonstrates understanding.  Co-developed goals to work towards.   Provided pt with list of goals and resources below via [employer].     Expected Engagement: good    Nutrition Goals  1) Try to not go longer than 4 hours without eating some food.   2) Aim to have a good source of protein with each eating episode  3) Aim for 90+ grams of protein  daily.    -Check out chat gpt for some new recipe ideas or even high protein food ideas    Healthier Sweet alternatives:               - Yasso bars               - Frozen yogurt ice cream alternatives               - Nature s Bakery brownie alternatives               - Could consider black bean, sweet potato or avocado based brownies               - Trail mix              - Homemade or store bought mousse              - Dessert hummus with fruit              - Chocolate covered fruit/nuts               - Yogurt covered fruit/nuts     Follow-Up: as needed.     Time spent with patient: 26 minutes.  Michelle Gonzales RD, LD      Again, thank you for allowing me to participate in the care of your patient.      Sincerely,    Michelle Gonzales RD

## 2025-06-23 NOTE — NURSING NOTE
Current patient location: [address]  [place] [address]    Is the patient currently in the state of [place]? YES    Visit mode: VIDEO    If the visit is dropped, the patient can be reconnected by:VIDEO VISIT: Send to e-mail at: [email or web address]    Will anyone else be joining the visit? NO  (If patient encounters technical issues they should call [phone]    Are changes needed to the allergy or medication list? N/A    Are refills needed on medications prescribed by this physician? NO    Rooming Documentation:  Questionnaire(s) not pre-assigned    Reason for visit: RECHECK    Pt states no weight available within last week, but guesses about 174#.    Oleg DELGADILLOF

## 2025-06-23 NOTE — PATIENT INSTRUCTIONS
Hi Ame,     Keep up the great work!    Follow-up with RD as needed.     Thank you,    Michelle Gonzales, JOSE J, LD  If you would like to schedule or reschedule an appointment with the RD, please call [phone]    Nutrition Goals  1) Try to not go longer than 4 hours without eating some food.   2) Aim to have a good source of protein with each eating episode  3) Aim for 90+ grams of protein daily.    -Check out chat gpt for some new recipe ideas or even high protein food ideas    Healthier Sweet alternatives:               - Yasso bars               - Frozen yogurt ice cream alternatives               - Nature s Bakery brownie alternatives               - Could consider black bean, sweet potato or avocado based brownies               - Trail mix              - Homemade or store bought mousse              - Dessert hummus with fruit              - Chocolate covered fruit/nuts               - Yogurt covered fruit/nuts     COMPREHENSIVE WEIGHT MANAGEMENT PROGRAM  VIRTUAL SUPPORT GROUPS    At [facility], our Comprehensive Weight Management program offers on-line support groups for patients who are working on weight loss and considering, preparing for, or have had weight loss surgery.     There is no cost for this opportunity.  You are invited to attend the?Virtual Support Groups?provided by any of the following locations:    [facility] via [employer] Teams with Tabatha Salinas RD, RN  2.   [place] via [employer] Teams with Leoncio Glaser, PhD, LP  3.   [place] via Znode with Cathy Skaggs, JORDY  4.   [facility] via a Zoom Meeting with SALOMÓN Sprague-    The following Support Group information can also be found on our website:  [email or web address]      [facility]   WEIGHT LOSS SURGERY SUPPORT GROUP  The support group is a patient-lead forum that meets monthly to share experiences, encouragement and  education. It is open to those who have had weight loss surgery, are scheduled for surgery, or are considering surgery.   WHEN: 3rd Wednesday of each month from 5:00PM - 6:00PM using Microsoft Teams.   FACILITATOR: Led by Tabatha Up RD, MORA, RN, the program's Clinical Coordinator.   TO REGISTER: Please contact the clinic via [employer] or call the nurse line directly at [phone] to inform our staff that you would like an invite sent to you and to let us know the email you would like the invite sent to. Prior to the meeting, a link with directions on how to join the meeting will be sent to you.    2025 Meetings, 3rd Wednesday, 5:00pm - 6:00pm    January 15: Let's Talk  February19: Let's Talk  March 19: Speaker: Lenin White RD, LD  April 16: Let's Talk  May 21: Speaker: Nayana Simms RD, LD  June18: Let's Talk  July 16: Let's Talk  August 20: Let's Talk  September 17: No meeting.  October 15: Speaker: Shavonne Gaytan PsyD, LP  November 19: Let's Talk  December 17: Let's Talk    [facility] [place] [facility] BARIATRIC CARE SUPPORT GROUP  This is open to all pre- and post- operative bariatric surgery patients as well as their support system.   WHEN: 3rd Tuesday of each month from 6:30 PM - 8:00 PM using Microsoft Teams.   FACILITATOR: Led by Leoncio Glaser, Ph.D who is a Licensed Psychologist with the [facility].   TO REGISTER: Please send an email to Leoncio Glaser, Ph.D., LP at?[email or web address] you would like an invitation to the group. Prior to the meeting, a link with directions on how to join the meeting will be sent to you.    2025 Meetings, 3rd Tuesday January 21st: Open Forum  February 18th: Medications and Bariatric Surgery  Speaker: Yanet Suárez, [place]'s Pharmacy Resident  March 18th: Open Forum  April 15th: Genetics of Obesity as well as Q&A, Speaker: Lana French MD, [facility].  May 20th: Open Forum  June 17th: Nutritional Labeling, Speaker: MARIJA  July 15th: Open Forum  August 19th: Open Forum  September 16th: Open Forum  October 21st: Open Forum  November 18th: Holiday Eating, Speaker: MARIJA  December 16th: Open Forum    [facility] POST-OPERATIVE BARIATRIC SURGERY SUPPORT GROUP  This is a support group for [facility] bariatric patients (and those external to [facility]) who have had bariatric surgery and are at least 3 months post-surgery.  WHEN: 4th Thursday of the month from 11:00 AM - 12:00 PM using Microsoft Teams.   FACILITATOR: Led by Certified Bariatric Nurse, Cathy Skaggs, RN, CBN.   TO REGISTER: Please send an email to Cathy at [email or web address] if you would like an invitation to the group.  Prior to the meeting, a link with directions on how to join the meeting will be sent to you.    2025 Meetings, 4th Thursday, 11:00am - 12:pm  January 23  February 27  March 27  April 24  May 22  Abi 26  July 24  August 28  September 25  October 23  November 27: Thanksgiving Day, No meeting.      December 25: Shaina Day, No meeting.        [facility]   HEALTHY LIFESTYLE COACHING GROUP  This is a 60 minute virtual coaching group for those who want to lead a healthier lifestyle. Come together to set goals and overcome barriers in a supportive group environment. We will address the four pillars of health: nutrition, exercise, sleep, and emotional well-being.   WHEN: 1st Friday of the month, 12:30 PM - 1:30 PM   using a Zoom meeting.     FACILITATOR: Led by National Board-Certified Health and , Cathy Malik, [facility].  TO REGISTER: Please call the [employer] at [phone] to register. You will get an appointment to attend in [email or web address]. Fifteen minutes prior to the meeting, complete the e-check in and you will get the link to  join the meeting.  There is no charge to attend this group and space is limited.  Please register for each month you wish to attend    2025 Meetings, 1st Friday, 12:30pm-1:30pm  January 3  February 7  March 7: No meeting.  April 4  May 2  Abi 6  July 4: Fourth of July Holiday, No meeting.    August 1  September 5  October 3  November 7  December 5

## 2025-07-01 ENCOUNTER — TELEPHONE (OUTPATIENT)
Dept: DERMATOLOGY | Facility: CLINIC | Age: 30
End: 2025-07-01
Payer: COMMERCIAL

## 2025-07-01 NOTE — TELEPHONE ENCOUNTER
7/1 Left Voicemail (1st Attempt) and Sent Mychart (1st Attempt) for the patient to call back and schedule the following:    Appointment type: Return Dermatology  Provider: Lindsey  Return date: 4/2/26  Specialty phone number: [phone]  Additional appointment(s) needed: na  Additonal Notes: ALYSSA Dunn

## 2025-07-05 ENCOUNTER — HEALTH MAINTENANCE LETTER (OUTPATIENT)
Age: 30
End: 2025-07-05

## 2025-07-30 DIAGNOSIS — M79.642 LEFT HAND PAIN: Primary | ICD-10-CM

## 2025-07-30 NOTE — TELEPHONE ENCOUNTER
DIAGNOSIS: (L) hand pain due to injury / self referred / [facility] / no previous surgery or imaging    APPOINTMENT DATE: 8/2/25   NOTES STATUS DETAILS   OFFICE NOTE from referring provider N/A Self Referral    MEDICATION LIST Internal    IMAGES     XRAYS (IMAGES & REPORTS) Scheduled 8/2/25 - XR Hand Left

## 2025-08-02 ENCOUNTER — PRE VISIT (OUTPATIENT)
Dept: ORTHOPEDICS | Facility: CLINIC | Age: 30
End: 2025-08-02

## 2025-08-02 ENCOUNTER — ANCILLARY PROCEDURE (OUTPATIENT)
Dept: GENERAL RADIOLOGY | Facility: CLINIC | Age: 30
End: 2025-08-02
Attending: FAMILY MEDICINE
Payer: COMMERCIAL

## 2025-08-02 ENCOUNTER — OFFICE VISIT (OUTPATIENT)
Dept: ORTHOPEDICS | Facility: CLINIC | Age: 30
End: 2025-08-02
Payer: COMMERCIAL

## 2025-08-02 DIAGNOSIS — M79.642 LEFT HAND PAIN: ICD-10-CM

## 2025-08-02 DIAGNOSIS — G56.12 LEFT MEDIAN NERVE NEUROPATHY: Primary | ICD-10-CM

## 2025-08-02 PROCEDURE — 73130 X-RAY EXAM OF HAND: CPT | Mod: LT | Performed by: RADIOLOGY

## 2025-08-02 PROCEDURE — 99213 OFFICE O/P EST LOW 20 MIN: CPT | Performed by: FAMILY MEDICINE

## 2025-08-02 SDOH — HEALTH STABILITY: PHYSICAL HEALTH: ON AVERAGE, HOW MANY DAYS PER WEEK DO YOU ENGAGE IN MODERATE TO STRENUOUS EXERCISE (LIKE A BRISK WALK)?: 5 DAYS

## 2025-08-02 SDOH — HEALTH STABILITY: PHYSICAL HEALTH: ON AVERAGE, HOW MANY MINUTES DO YOU ENGAGE IN EXERCISE AT THIS LEVEL?: 50 MIN

## 2025-09-02 ENCOUNTER — OFFICE VISIT (OUTPATIENT)
Dept: URGENT CARE | Facility: URGENT CARE | Age: 30
End: 2025-09-02
Payer: COMMERCIAL

## 2025-09-02 VITALS
OXYGEN SATURATION: 100 % | BODY MASS INDEX: 27.49 KG/M2 | SYSTOLIC BLOOD PRESSURE: 120 MMHG | WEIGHT: 165 LBS | RESPIRATION RATE: 16 BRPM | HEART RATE: 102 BPM | TEMPERATURE: 98 F | DIASTOLIC BLOOD PRESSURE: 74 MMHG | HEIGHT: 65 IN

## 2025-09-02 DIAGNOSIS — J06.9 VIRAL URI WITH COUGH: Primary | ICD-10-CM

## 2025-09-02 DIAGNOSIS — R07.0 THROAT PAIN: ICD-10-CM

## 2025-09-02 DIAGNOSIS — R11.0 NAUSEA: ICD-10-CM

## 2025-09-02 LAB
DEPRECATED S PYO AG THROAT QL EIA: NEGATIVE
S PYO DNA THROAT QL NAA+PROBE: NOT DETECTED

## 2025-09-02 PROCEDURE — 99213 OFFICE O/P EST LOW 20 MIN: CPT | Performed by: NURSE PRACTITIONER

## 2025-09-02 PROCEDURE — 87651 STREP A DNA AMP PROBE: CPT | Performed by: NURSE PRACTITIONER

## 2025-09-02 PROCEDURE — 3078F DIAST BP <80 MM HG: CPT | Performed by: NURSE PRACTITIONER

## 2025-09-02 PROCEDURE — 87635 SARS-COV-2 COVID-19 AMP PRB: CPT | Performed by: NURSE PRACTITIONER

## 2025-09-02 PROCEDURE — 3074F SYST BP LT 130 MM HG: CPT | Performed by: NURSE PRACTITIONER

## 2025-09-02 RX ORDER — ONDANSETRON 4 MG/1
4 TABLET, ORALLY DISINTEGRATING ORAL EVERY 8 HOURS PRN
Qty: 15 TABLET | Refills: 0 | Status: SHIPPED | OUTPATIENT
Start: 2025-09-02 | End: 2025-09-09

## 2025-09-03 LAB — SARS-COV-2 RNA RESP QL NAA+PROBE: POSITIVE

## (undated) DEVICE — PREP CHLORAPREP 26ML TINTED ORANGE  260815

## (undated) DEVICE — LINEN ORTHO PACK 5446

## (undated) DEVICE — PREP SKIN SCRUB TRAY 4461A

## (undated) DEVICE — GLOVE BIOGEL PI MICRO SZ 7.0 48570

## (undated) DEVICE — SU MONOCRYL 4-0 PS-2 27" UND Y426H

## (undated) DEVICE — PREP POVIDONE-IODINE 7.5% SCRUB 4OZ BOTTLE MDS093945

## (undated) DEVICE — DRAPE STERI U 1015

## (undated) DEVICE — CAST PADDING 4" UNSTERILE 9044

## (undated) DEVICE — ESU GROUND PAD ADULT W/CORD E7507

## (undated) DEVICE — GOWN XLG DISP 9545

## (undated) DEVICE — SU VICRYL 2-0 SH 27" UND J417H

## (undated) DEVICE — SUCTION MANIFOLD NEPTUNE 2 SYS 1 PORT 702-025-000

## (undated) DEVICE — SPECIMEN CONTAINER 5OZ STERILE 2600SA

## (undated) DEVICE — SOL NACL 0.9% IRRIG 500ML BOTTLE 2F7123

## (undated) RX ORDER — OXYCODONE HYDROCHLORIDE 5 MG/1
TABLET ORAL
Status: DISPENSED
Start: 2023-06-01

## (undated) RX ORDER — BUPIVACAINE HYDROCHLORIDE 2.5 MG/ML
INJECTION, SOLUTION EPIDURAL; INFILTRATION; INTRACAUDAL
Status: DISPENSED
Start: 2023-06-01

## (undated) RX ORDER — DEXAMETHASONE SODIUM PHOSPHATE 4 MG/ML
INJECTION, SOLUTION INTRA-ARTICULAR; INTRALESIONAL; INTRAMUSCULAR; INTRAVENOUS; SOFT TISSUE
Status: DISPENSED
Start: 2023-06-01

## (undated) RX ORDER — LIDOCAINE HYDROCHLORIDE AND EPINEPHRINE 10; 10 MG/ML; UG/ML
INJECTION, SOLUTION INFILTRATION; PERINEURAL
Status: DISPENSED
Start: 2024-05-23

## (undated) RX ORDER — HYDROMORPHONE HYDROCHLORIDE 1 MG/ML
INJECTION, SOLUTION INTRAMUSCULAR; INTRAVENOUS; SUBCUTANEOUS
Status: DISPENSED
Start: 2023-06-01

## (undated) RX ORDER — CEFAZOLIN SODIUM 2 G/50ML
SOLUTION INTRAVENOUS
Status: DISPENSED
Start: 2023-06-01

## (undated) RX ORDER — ACETAMINOPHEN 325 MG/1
TABLET ORAL
Status: DISPENSED
Start: 2023-06-01

## (undated) RX ORDER — PROPOFOL 10 MG/ML
INJECTION, EMULSION INTRAVENOUS
Status: DISPENSED
Start: 2023-06-01

## (undated) RX ORDER — FENTANYL CITRATE 50 UG/ML
INJECTION, SOLUTION INTRAMUSCULAR; INTRAVENOUS
Status: DISPENSED
Start: 2023-06-01

## (undated) RX ORDER — ONDANSETRON 2 MG/ML
INJECTION INTRAMUSCULAR; INTRAVENOUS
Status: DISPENSED
Start: 2023-06-01